# Patient Record
Sex: MALE | Race: WHITE | NOT HISPANIC OR LATINO | Employment: OTHER | URBAN - METROPOLITAN AREA
[De-identification: names, ages, dates, MRNs, and addresses within clinical notes are randomized per-mention and may not be internally consistent; named-entity substitution may affect disease eponyms.]

---

## 2017-08-15 RX ORDER — LISINOPRIL AND HYDROCHLOROTHIAZIDE 10; 12.5 MG/1; MG/1
TABLET ORAL
Qty: 90 TABLET | Refills: 3 | Status: SHIPPED | OUTPATIENT
Start: 2017-08-15 | End: 2018-08-02 | Stop reason: SDUPTHER

## 2017-09-18 ENCOUNTER — HOSPITAL ENCOUNTER (OUTPATIENT)
Dept: CARDIOLOGY | Facility: HOSPITAL | Age: 77
Discharge: HOME | End: 2017-09-18
Payer: MEDICARE

## 2017-09-18 DIAGNOSIS — M17.11 OSTEOARTHRITIS OF RIGHT KNEE: Primary | ICD-10-CM

## 2017-09-18 DIAGNOSIS — M17.11 OSTEOARTHRITIS OF RIGHT KNEE: ICD-10-CM

## 2017-09-18 PROCEDURE — 93010 ELECTROCARDIOGRAM REPORT: CPT | Mod: ,,, | Performed by: INTERNAL MEDICINE

## 2017-09-18 PROCEDURE — 93005 ELECTROCARDIOGRAM TRACING: CPT | Mod: PO

## 2017-09-19 ENCOUNTER — APPOINTMENT (OUTPATIENT)
Dept: LAB | Facility: HOSPITAL | Age: 77
End: 2017-09-19
Payer: MEDICARE

## 2017-09-19 DIAGNOSIS — Z01.818 PREOP EXAMINATION: Primary | ICD-10-CM

## 2017-09-19 LAB
BILIRUB UR QL STRIP: NEGATIVE
CLARITY UR REFRACT.AUTO: CLEAR
COLOR UR AUTO: ABNORMAL
GLUCOSE UR QL STRIP: NEGATIVE
HGB UR QL STRIP: ABNORMAL
KETONES UR QL STRIP: NEGATIVE
LEUKOCYTE ESTERASE UR QL STRIP: NEGATIVE
NITRITE UR QL STRIP: NEGATIVE
PH UR STRIP: 5 [PH] (ref 5–8)
PROT UR QL STRIP: NEGATIVE
SP GR UR STRIP: 1.02 (ref 1–1.03)
URN SPEC COLLECT METH UR: ABNORMAL
UROBILINOGEN UR STRIP-ACNC: NEGATIVE EU/DL

## 2017-09-19 PROCEDURE — 81003 URINALYSIS AUTO W/O SCOPE: CPT | Mod: PO

## 2017-09-25 ENCOUNTER — OFFICE VISIT (OUTPATIENT)
Dept: FAMILY MEDICINE | Facility: CLINIC | Age: 77
End: 2017-09-25
Payer: MEDICARE

## 2017-09-25 VITALS
DIASTOLIC BLOOD PRESSURE: 74 MMHG | TEMPERATURE: 99 F | BODY MASS INDEX: 32.61 KG/M2 | OXYGEN SATURATION: 95 % | HEART RATE: 78 BPM | SYSTOLIC BLOOD PRESSURE: 126 MMHG | WEIGHT: 215.19 LBS | HEIGHT: 68 IN

## 2017-09-25 DIAGNOSIS — I45.10 RIGHT BUNDLE BRANCH BLOCK: ICD-10-CM

## 2017-09-25 DIAGNOSIS — E87.6 HYPOKALEMIA: ICD-10-CM

## 2017-09-25 DIAGNOSIS — I10 ESSENTIAL HYPERTENSION: ICD-10-CM

## 2017-09-25 DIAGNOSIS — Z01.818 PREOPERATIVE EXAMINATION: Primary | ICD-10-CM

## 2017-09-25 PROCEDURE — 1159F MED LIST DOCD IN RCRD: CPT | Mod: S$GLB,,, | Performed by: FAMILY MEDICINE

## 2017-09-25 PROCEDURE — 1125F AMNT PAIN NOTED PAIN PRSNT: CPT | Mod: S$GLB,,, | Performed by: FAMILY MEDICINE

## 2017-09-25 PROCEDURE — 99213 OFFICE O/P EST LOW 20 MIN: CPT | Mod: 25,S$GLB,, | Performed by: FAMILY MEDICINE

## 2017-09-25 PROCEDURE — 3074F SYST BP LT 130 MM HG: CPT | Mod: S$GLB,,, | Performed by: FAMILY MEDICINE

## 2017-09-25 PROCEDURE — 3078F DIAST BP <80 MM HG: CPT | Mod: S$GLB,,, | Performed by: FAMILY MEDICINE

## 2017-09-25 RX ORDER — MECLIZINE HYDROCHLORIDE 25 MG/1
25 TABLET ORAL 3 TIMES DAILY PRN
Qty: 60 TABLET | Refills: 1 | Status: SHIPPED | OUTPATIENT
Start: 2017-09-25 | End: 2018-11-30

## 2017-09-25 RX ORDER — MELOXICAM 15 MG/1
TABLET ORAL
COMMUNITY
Start: 2017-08-11 | End: 2017-09-25

## 2017-09-25 RX ORDER — HYDROCODONE BITARTRATE AND IBUPROFEN 7.5; 2 MG/1; MG/1
TABLET, FILM COATED ORAL
Status: ON HOLD | COMMUNITY
Start: 2017-09-05 | End: 2017-10-04 | Stop reason: HOSPADM

## 2017-09-25 RX ORDER — MECLIZINE HYDROCHLORIDE 25 MG/1
25 TABLET ORAL 3 TIMES DAILY PRN
COMMUNITY
End: 2017-09-25 | Stop reason: SDUPTHER

## 2017-09-26 ENCOUNTER — TELEPHONE (OUTPATIENT)
Dept: FAMILY MEDICINE | Facility: CLINIC | Age: 77
End: 2017-09-26

## 2017-09-26 NOTE — PROGRESS NOTES
Patient ID: Teo Gloria is a 77 y.o. male.    Chief Complaint: Pre-op Exam    HPI       Teo Gloria is a 77 y.o. male here for preoperative clearance.  He has no upper respiratory or lower respiratory infections.  He has no chest pain palpitations.  He has no PND or orthopnea.  He has no change in his physical endurance.  He is able to walk up stairs without stopping to rest.  Limiting factor knee.      Review of Symptoms    Constitutional  Neg activity change except due to knee pain, No chills/ fever   Resp  Neg hemoptysis, stridor, choking  CVS  Neg chest pain, palpitations    Physical Exam    Constitutional:   Oriented to person, place, and time.appears well-developed and well-nourished.   No distress.     HENT:   Head: Normocephalic and atraumatic.     Right Ear: Tympanic membrane, external ear and ear canal normal     Left Ear: Tympanic membrane, external ear and ear canal normal    Nose: External Normal. Normal turbinates, No rhinorrhea     Mouth/Throat: Uvula is midline, oropharynx is clear and moist and mucous membranes are normal.     Neck: supple no anterior cervical adenopathy    Carotid artery:  Bruit    NEG [x]   POS[]    Eyes:   Conjunctivae are normal. Right eye exhibits no discharge. Left eye exhibits no discharge. No scleral icterus. No periorbital edema       Cardiovascular:  Regular rate, Regular rhythm     No extrasystole  Normal S1-S2    Pulmonary/Chest:   Normal effort and breath sounds.  No wheezing, rhonchi or rales.       Neurological:   Alert and oriented to person, place, and time. Coordination normal.     Skin:   Skin is warm and dry.   No diaphoresis.   No rash noted.    Psychiatric: Normal mood and affect. Behavior is normal. Judgment and thought content normal.       Assessment / Plan:      ICD-10-CM ICD-9-CM   1. Preoperative examination Z01.818 V72.84   2. Hypokalemia E87.6 276.8   3. Essential hypertension I10 401.9   4. Right bundle branch block I45.10 426.4     Preoperative  examination    Hypokalemia  -     Basic metabolic panel; Future; Expected date: 09/25/2017    Essential hypertension    Right bundle branch block    Other orders  -     meclizine (ANTIVERT) 25 mg tablet; Take 1 tablet (25 mg total) by mouth 3 (three) times daily as needed.  Dispense: 60 tablet; Refill: 1        Mr. Bruce is in good physical condition and has asymptomatic right bundle-branch block.  He is stable for upcoming orthopedic surgery to replace right knee.  Basic metabolic panel demonstrates slightly low potassium would consider redrawing prior to surgery or morning of surgery.  BMP ordered at [facility]    Lab work performed nonfasting-encourage appropriate fluid intake

## 2017-09-27 ENCOUNTER — ANESTHESIA EVENT (OUTPATIENT)
Dept: SURGERY | Facility: HOSPITAL | Age: 77
DRG: 470 | End: 2017-09-27
Payer: MEDICARE

## 2017-09-27 ENCOUNTER — HOSPITAL ENCOUNTER (OUTPATIENT)
Dept: PREADMISSION TESTING | Facility: HOSPITAL | Age: 77
Discharge: HOME | End: 2017-09-27
Payer: MEDICARE

## 2017-09-27 VITALS
WEIGHT: 215 LBS | DIASTOLIC BLOOD PRESSURE: 79 MMHG | HEART RATE: 69 BPM | BODY MASS INDEX: 32.58 KG/M2 | RESPIRATION RATE: 18 BRPM | SYSTOLIC BLOOD PRESSURE: 175 MMHG | OXYGEN SATURATION: 99 % | HEIGHT: 68 IN

## 2017-09-27 DIAGNOSIS — Z01.818 PRE-OP EXAMINATION: Primary | ICD-10-CM

## 2017-09-27 RX ORDER — SODIUM CHLORIDE, SODIUM LACTATE, POTASSIUM CHLORIDE, CALCIUM CHLORIDE 600; 310; 30; 20 MG/100ML; MG/100ML; MG/100ML; MG/100ML
INJECTION, SOLUTION INTRAVENOUS CONTINUOUS
Status: CANCELLED | OUTPATIENT
Start: 2017-09-27

## 2017-09-27 RX ORDER — LIDOCAINE HYDROCHLORIDE 10 MG/ML
1 INJECTION, SOLUTION EPIDURAL; INFILTRATION; INTRACAUDAL; PERINEURAL ONCE
Status: CANCELLED | OUTPATIENT
Start: 2017-09-27 | End: 2017-09-27

## 2017-09-27 RX ORDER — CEFAZOLIN SODIUM 2 G/50ML
2 SOLUTION INTRAVENOUS
Status: CANCELLED | OUTPATIENT
Start: 2017-10-03

## 2017-09-27 NOTE — PROGRESS NOTES
Patient present at joint camp today for education and teaching prior to surgery.  Patient has walker and bedside commode at home already.  No durable medical equipment ordered today.

## 2017-09-27 NOTE — PRE-PROCEDURE INSTRUCTIONS
Jordan Del Cid [phone] - son    Allergies, medical, surgical, family and psychosocial histories reviewed with patient. Periop plan of care reviewed. Preop instructions given, including medications to take and to hold. Time allotted for questions to be addressed.  Patient verbalized understanding.

## 2017-09-27 NOTE — PROGRESS NOTES
Patient is present at joint camp today for education and teaching prior to surgery.  Equipment order today for delivery to home address prior to surgery was a walker and bedside commode.

## 2017-09-27 NOTE — ANESTHESIA PREPROCEDURE EVALUATION
"                                                                                                             09/27/2017  Teo Gloria is a 77 y.o., male is scheduled for right TKA under spinal/reg/MAC/gen on 10/03/2017.    PCP H&P and clearance in [facility] on 9/26/2017.  Pt with mild hypokalemia and on lisinopril-HCTZ.  Will re-draw K+ day of surgery.        Past Surgical History:   Procedure Laterality Date    APPENDECTOMY      BACK SURGERY      CHOLECYSTECTOMY      COLONOSCOPY  01/01/2017    repeat Q 5 years    EYE SURGERY Bilateral     cataract    HERNIA REPAIR      SHOULDER ARTHROSCOPY Bilateral     TOTAL KNEE ARTHROPLASTY Right 1960's         Anesthesia Evaluation    I have reviewed the Patient Summary Reports.    I have reviewed the Nursing Notes.   I have reviewed the Medications.     Review of Systems  Anesthesia Hx:  No problems with previous Anesthesia  History of prior surgery of interest to airway management or planning: Previous anesthesia: General, MAC  Denies Personal Hx of Anesthesia complications.   Social:  No Alcohol Use, Non-Smoker    Hematology/Oncology:  Hematology Normal        EENT/Dental:EENT/Dental Normal   Cardiovascular:   Exercise tolerance: good Hypertension, well controlled Denies Dysrhythmias.   Denies Angina.     ECG has been reviewed.    Pulmonary:   Denies Shortness of breath.    Renal/:  Renal/ Normal     Hepatic/GI:  Hepatic/GI Normal    Musculoskeletal:   Arthritis  Right knee pain   Neurological:  Neurology Normal  Neuro Symptoms of vertigo Pt states "sinus related equilibrium problems"  States controls with antivert; denies at present  Endocrine:  Endocrine Normal        Physical Exam  General:  Obesity    Airway/Jaw/Neck:  Airway Findings: Mouth Opening: Normal Tongue: Normal  General Airway Assessment: Adult  Mallampati: II  TM Distance: Normal, at least 6 cm  Jaw/Neck Findings:     Eyes/Ears/Nose:  EYES/EARS/NOSE FINDINGS: Normal   Dental:  Dental Findings: " Periodontal disease, Mild    Chest/Lungs:  Chest/Lungs Clear    Heart/Vascular:  Heart Findings: Normal Heart murmur: negative    Abdomen:  Abdomen Findings: Normal    Musculoskeletal:  Musculoskeletal Findings: (right knee) Tender Joint     Mental Status:  Mental Status Findings: Normal      EKG 9/18/2017  Sinus rhythm with marked sinus arrythmia  Right bundle branch block  Abnormal ECG  No previous ECGs available  Confirmed by CLAYTON MARKS, CANDY ([id number]) on 9/18/2017 4:43:54 PM    Anesthesia Plan  Type of Anesthesia, risks & benefits discussed:  Anesthesia Type:  general, MAC, regional, spinal  Patient's Preference:   Intra-op Monitoring Plan: standard ASA monitors  Intra-op Monitoring Plan Comments:   Post Op Pain Control Plan: multimodal analgesia  Post Op Pain Control Plan Comments:   Induction:   IV  Beta Blocker:  Patient is not currently on a Beta-Blocker (No further documentation required).       Informed Consent: Patient understands risks and agrees with Anesthesia plan.  Questions answered.   ASA Score: 2     Day of Surgery Review of History & Physical:        Anesthesia Plan Notes: PCP H&P and clearance in [facility] on 9/26/2017.  Pt with mild hypokalemia and on lisinopril-HCTZ.  Will re-draw K+ day of surgery.          Ready For Surgery From Anesthesia Perspective.

## 2017-09-27 NOTE — DISCHARGE INSTRUCTIONS
Your surgery is scheduled for 10/3/17.    Please report to Outpatient Surgery Intake Office on the 2nd FLOOR at 5:30a.m.          INSTRUCTIONS IMPORTANT!!!  ¨ Do not eat or drink after 12 midnight-including water. OK to brush teeth, no   gum, candy or mints!    ¨ Take only these medicines with a small swallow of water-morning of surgery: Lisinopril          ____  No powder, lotions or creams to surgical area.  ____  Please remove all jewelry, including piercings and leave at home.  ____  No money or valuables needed. Please leave at home.  ____  Please bring any documents given by your doctor.  ____  If going home the same day, arrange for a ride home. You will not be able to             drive if Anesthesia was used.  ____  Wear loose fitting clothing. Allow for dressings, bandages.  ____  Stop Aspirin, Ibuprofen, Motrin and Aleve at least 3-5 days before surgery, unless otherwise instructed by your doctor, or the nurse.   You MAY use Tylenol/acetaminophen until day of surgery.  ____  Wash the surgical area with Hibiclens the night before surgery, and again the             morning of surgery.  Be sure to rinse hibiclens off completely (if instructed by   nurse).  ____  If you take diabetic medication, do not take am of surgery unless instructed by Doctor.  ____  Call MD for temperature above 101 degrees.  ____ Stop taking any Fish Oil supplement or any Vitamins that contain Vitamin E at least 5 days prior to surgery.  ____ Do Not wear your contact lenses the day of your procedure.  You may wear your glasses.        I have read or had read and explained to me, and understand the above information.  Additional comments or instructions:  For additional questions call [phone]     Take a Hibiclens shower twice a day for 3 days prior to surgery, including the morning of surgery.   Gargle with Listerine twice a day for 3 days prior to surgery, including the morning of surgery.      Pre-Op Bathing Instructions    Before  surgery, you can play an important role in your own health.    Because skin is not sterile, we need to be sure that your skin is as free of germs as possible. By following the instructions below, you can reduce the number of germs on your skin before surgery.    IMPORTANT: You will need to shower with a special soap called Hibiclens*, available at any pharmacy.  If you are allergic to Chlorhexidine (the antiseptic in Hibiclens), use an antibacterial soap such as Dial Soap for your preoperative shower.  You will shower with Hibiclens both the night before your surgery and the morning of your surgery.  Do not use Hibiclens on the head, face or genitals to avoid injury to those areas.    STEP #1: THE NIGHT BEFORE YOUR SURGERY     1. Do not shave the area of your body where your surgery will be performed.  2. Shower and wash your hair and body as usual with your normal soap and shampoo.  3. Rinse your hair and body thoroughly after you shower to remove all soap residue.  4. With your hand, apply one packet of Hibiclens soap to the surgical site.   5. Wash the site gently for five (5) minutes. Do not scrub your skin too hard.   6. Do not wash with your regular soap after Hibiclens is used.  7. Rinse your body thoroughly.  8. Pat yourself dry with a clean, soft towel.  9. Do not use lotion, cream, or powder.  10. Wear clean clothes.    STEP #2: THE MORNING OF YOUR SURGERY     1. Repeat Step #1.    * Not to be used by people allergic to Chlorhexidine.          Anesthesia: General Anesthesia     You are watched continuously during your procedure by your anesthesia provider.     Youre due to have surgery. During surgery, youll be given medicine called anesthesia or anesthetic. This will keep you comfortable and pain-free. Your anesthesia provider will use general anesthesia.  What is general anesthesia?  General anesthesia puts you into a state like deep sleep. It goes into the bloodstream (IV anesthetics), into the lungs  (gas anesthetics), or both. You feel nothing during the procedure. You will not remember it. During the procedure, the anesthesia provider monitors you continuously. He or she checks your heart rate and rhythm, blood pressure, breathing, and blood oxygen.  · IV anesthetics. IV anesthetics are given through an IV line in your arm. Theyre often given first. This is so you are asleep before a gas anesthetic is started. Some kinds of IV anesthetics relieve pain. Others relax you. Your doctor will decide which kind is best in your case.  · Gas anesthetics. Gas anesthetics are breathed into the lungs. They are often used to keep you asleep. They can be given through a facemask or a tube placed in your larynx or trachea (breathing tube).  ¨ If you have a facemask, your anesthesia provider will most likely place it over your nose and mouth while youre still awake. Youll breathe oxygen through the mask as your IV anesthetic is started. Gas anesthetic may be added through the mask.  ¨ If you have a tube in the larynx or trachea, it will be inserted into your throat after youre asleep.  Anesthesia tools and medicines  You will likely have:  · IV anesthetics. These are put into an IV line into your bloodstream.  · Gas anesthetics. You breathe these anesthetics into your lungs, where they pass into your bloodstream.  · Pulse oximeter. This is a small clip that is attached to the end of your finger. This measures your blood oxygen level.  · Electrocardiography leads (electrodes). These are small sticky pads that are placed on your chest. They record your heart rate and rhythm.  · Blood pressure cuff. This reads your blood pressure.  Risks and possible complications  General anesthesia has some risks. These include:  · Breathing problems  · Nausea and vomiting  · Sore throat or hoarseness (usually temporary)  · Allergic reaction to the anesthetic  · Irregular heartbeat (rare)  · Cardiac arrest (rare)   Anesthesia  safety  · Follow all instructions you are given for how long not to eat or drink before your procedure.  · Be sure your doctor knows what medicines and drugs you take. This includes over-the-counter medicines, herbs, supplements, alcohol or other drugs. You will be asked when those were last taken.  · Have an adult family member or friend drive you home after the procedure.  · For the first 24 hours after your surgery:  ¨ Do not drive or use heavy equipment.  ¨ Do not make important decisions or sign legal documents. If important decisions or signing legal documents is necessary during the first 24 hours after surgery, have a trusted family member or spouse act on your behalf.  ¨ Avoid alcohol.  ¨ Have a responsible adult stay with you. He or she can watch for problems and help keep you safe.  Date Last Reviewed: 12/1/2016 © 2000-2017 [employer]. [address] [place] [address]. All rights reserved. This information is not intended as a substitute for professional medical care. Always follow your healthcare professional's instructions.        Total Knee Replacement  During total knee replacement surgery, your damaged knee joint is replaced with an artificial joint, called a prosthesis. This surgery almost always reduces joint pain and improves your quality of life.     The parts of the prosthesis are secured to the bones of the knee. Together they form the new joint.   Before your surgery  You will most likely arrive at the hospital on the morning of the surgery. Be sure to follow all of your doctors instructions on preparing for surgery:  · Follow any directions you are given for taking medicines or for not eating or drinking before surgery.  · At the hospital, your temperature, pulse, breathing, and blood pressure will be checked.  · An IV (intravenous) line will be started to provide fluids and medicines needed during surgery.  The surgical procedure  When the surgical team is ready,  youll be taken to the operating room. There youll be given anesthesia to help you sleep through surgery, or to make you numb from the waist down. Then an incision is made on the front or side of your knee. Any damaged bone is cleaned away, and the new joint components are put into place. The incision is closed with surgical staples or stitches.  After your surgery  After surgery, youll be sent to the recovery room. When you are fully awake, youll be moved to your room. The nurses will give you medicines to ease your pain. You may have a catheter (small tube) in your bladder. A continuous passive motion machine may be used on your knee to keep it from getting stiff. A sequential compression machine may be used to prevent blood clots by gently squeezing then releasing your leg. You may be given medicine to prevent blood clots. Soon, healthcare providers will help you get up and moving.  When to call your doctor  Once at home, call your doctor if you have any of the symptoms below:  · An increase in knee pain  · Pain or swelling in the calf or leg  · Unusual redness, heat, or drainage at the incision site  · Fever of 100.4°F (38°C) or higher  · Shaking chills  · Trouble breathing or chest pain (call 911)   Date Last Reviewed: 9/20/2015 © 2000-2017 [employer]. [address] [place] [address], [place] [address]. All rights reserved. This information is not intended as a substitute for professional medical care. Always follow your healthcare professional's instructions.

## 2017-10-03 ENCOUNTER — ANESTHESIA (OUTPATIENT)
Dept: SURGERY | Facility: HOSPITAL | Age: 77
DRG: 470 | End: 2017-10-03
Payer: MEDICARE

## 2017-10-03 ENCOUNTER — HOSPITAL ENCOUNTER (INPATIENT)
Facility: HOSPITAL | Age: 77
LOS: 1 days | Discharge: HOME HEALTH | DRG: 470 | End: 2017-10-04
Payer: MEDICARE

## 2017-10-03 DIAGNOSIS — Z01.818 PRE-OP EXAMINATION: ICD-10-CM

## 2017-10-03 DIAGNOSIS — M17.11 RIGHT KNEE DJD: Primary | ICD-10-CM

## 2017-10-03 LAB
ANION GAP SERPL CALC-SCNC: 11 MMOL/L
BUN SERPL-MCNC: 23 MG/DL
CALCIUM SERPL-MCNC: 9.1 MG/DL
CHLORIDE SERPL-SCNC: 105 MMOL/L
CO2 SERPL-SCNC: 26 MMOL/L
CREAT SERPL-MCNC: 1 MG/DL
EST. GFR  (AFRICAN AMERICAN): >60 ML/MIN/1.73 M^2
EST. GFR  (NON AFRICAN AMERICAN): >60 ML/MIN/1.73 M^2
GLUCOSE SERPL-MCNC: 102 MG/DL
HCT VFR BLD AUTO: 36.1 %
POTASSIUM SERPL-SCNC: 3.2 MMOL/L
SODIUM SERPL-SCNC: 142 MMOL/L

## 2017-10-03 PROCEDURE — 63600175 PHARM REV CODE 636 W HCPCS

## 2017-10-03 PROCEDURE — 76942 ECHO GUIDE FOR BIOPSY: CPT | Performed by: ANESTHESIOLOGY

## 2017-10-03 PROCEDURE — C1713 ANCHOR/SCREW BN/BN,TIS/BN: HCPCS

## 2017-10-03 PROCEDURE — 36000710

## 2017-10-03 PROCEDURE — 25000003 PHARM REV CODE 250: Performed by: NURSE PRACTITIONER

## 2017-10-03 PROCEDURE — 63600175 PHARM REV CODE 636 W HCPCS: Performed by: ANESTHESIOLOGY

## 2017-10-03 PROCEDURE — 63600175 PHARM REV CODE 636 W HCPCS: Performed by: NURSE ANESTHETIST, CERTIFIED REGISTERED

## 2017-10-03 PROCEDURE — 37000009 HC ANESTHESIA EA ADD 15 MINS

## 2017-10-03 PROCEDURE — 97165 OT EVAL LOW COMPLEX 30 MIN: CPT

## 2017-10-03 PROCEDURE — 0SRC0J9 REPLACEMENT OF RIGHT KNEE JOINT WITH SYNTHETIC SUBSTITUTE, CEMENTED, OPEN APPROACH: ICD-10-PCS

## 2017-10-03 PROCEDURE — 88305 TISSUE EXAM BY PATHOLOGIST: CPT | Performed by: PATHOLOGY

## 2017-10-03 PROCEDURE — G8979 MOBILITY GOAL STATUS: HCPCS | Mod: CJ

## 2017-10-03 PROCEDURE — 11000001 HC ACUTE MED/SURG PRIVATE ROOM

## 2017-10-03 PROCEDURE — 36415 COLL VENOUS BLD VENIPUNCTURE: CPT

## 2017-10-03 PROCEDURE — 25000003 PHARM REV CODE 250: Performed by: NURSE ANESTHETIST, CERTIFIED REGISTERED

## 2017-10-03 PROCEDURE — 25000003 PHARM REV CODE 250

## 2017-10-03 PROCEDURE — 71000033 HC RECOVERY, INTIAL HOUR

## 2017-10-03 PROCEDURE — 94761 N-INVAS EAR/PLS OXIMETRY MLT: CPT

## 2017-10-03 PROCEDURE — 27201423 OPTIME MED/SURG SUP & DEVICES STERILE SUPPLY

## 2017-10-03 PROCEDURE — G8978 MOBILITY CURRENT STATUS: HCPCS | Mod: CK

## 2017-10-03 PROCEDURE — C1776 JOINT DEVICE (IMPLANTABLE): HCPCS

## 2017-10-03 PROCEDURE — 97161 PT EVAL LOW COMPLEX 20 MIN: CPT

## 2017-10-03 PROCEDURE — 3E0T3BZ INTRODUCTION OF ANESTHETIC AGENT INTO PERIPHERAL NERVES AND PLEXI, PERCUTANEOUS APPROACH: ICD-10-PCS

## 2017-10-03 PROCEDURE — 25000003 PHARM REV CODE 250: Performed by: ANESTHESIOLOGY

## 2017-10-03 PROCEDURE — 80048 BASIC METABOLIC PNL TOTAL CA: CPT

## 2017-10-03 PROCEDURE — 85014 HEMATOCRIT: CPT

## 2017-10-03 PROCEDURE — 36000711

## 2017-10-03 PROCEDURE — C9290 INJ, BUPIVACAINE LIPOSOME: HCPCS

## 2017-10-03 PROCEDURE — 37000008 HC ANESTHESIA 1ST 15 MINUTES

## 2017-10-03 PROCEDURE — 88305 TISSUE EXAM BY PATHOLOGIST: CPT | Mod: 26,,, | Performed by: PATHOLOGY

## 2017-10-03 PROCEDURE — 71000039 HC RECOVERY, EACH ADD'L HOUR

## 2017-10-03 DEVICE — COMP FEM POS ATTUNE SZ6 R: Type: IMPLANTABLE DEVICE | Site: KNEE | Status: FUNCTIONAL

## 2017-10-03 DEVICE — PATELLA ATTUNE MEDLZD DOME 41: Type: IMPLANTABLE DEVICE | Site: KNEE | Status: FUNCTIONAL

## 2017-10-03 DEVICE — IMPLANTABLE DEVICE: Type: IMPLANTABLE DEVICE | Site: KNEE | Status: FUNCTIONAL

## 2017-10-03 DEVICE — CEMENT BONE IMPLANT: Type: IMPLANTABLE DEVICE | Site: KNEE | Status: FUNCTIONAL

## 2017-10-03 RX ORDER — SODIUM CHLORIDE, SODIUM LACTATE, POTASSIUM CHLORIDE, CALCIUM CHLORIDE 600; 310; 30; 20 MG/100ML; MG/100ML; MG/100ML; MG/100ML
INJECTION, SOLUTION INTRAVENOUS CONTINUOUS
Status: DISCONTINUED | OUTPATIENT
Start: 2017-10-03 | End: 2017-10-04

## 2017-10-03 RX ORDER — HYDROCHLOROTHIAZIDE 12.5 MG/1
12.5 TABLET ORAL DAILY
Status: DISCONTINUED | OUTPATIENT
Start: 2017-10-03 | End: 2017-10-04 | Stop reason: HOSPADM

## 2017-10-03 RX ORDER — ONDANSETRON 2 MG/ML
4 INJECTION INTRAMUSCULAR; INTRAVENOUS DAILY PRN
Status: DISCONTINUED | OUTPATIENT
Start: 2017-10-03 | End: 2017-10-03

## 2017-10-03 RX ORDER — LIDOCAINE HCL/PF 100 MG/5ML
SYRINGE (ML) INTRAVENOUS
Status: DISCONTINUED | OUTPATIENT
Start: 2017-10-03 | End: 2017-10-03

## 2017-10-03 RX ORDER — RAMELTEON 8 MG/1
8 TABLET ORAL NIGHTLY PRN
Status: DISCONTINUED | OUTPATIENT
Start: 2017-10-03 | End: 2017-10-04 | Stop reason: HOSPADM

## 2017-10-03 RX ORDER — DIPHENHYDRAMINE HYDROCHLORIDE 50 MG/ML
12.5 INJECTION INTRAMUSCULAR; INTRAVENOUS EVERY 6 HOURS PRN
Status: DISCONTINUED | OUTPATIENT
Start: 2017-10-03 | End: 2017-10-03

## 2017-10-03 RX ORDER — SUCCINYLCHOLINE CHLORIDE 20 MG/ML
INJECTION INTRAMUSCULAR; INTRAVENOUS
Status: DISCONTINUED | OUTPATIENT
Start: 2017-10-03 | End: 2017-10-03

## 2017-10-03 RX ORDER — ACETAMINOPHEN 10 MG/ML
1000 INJECTION, SOLUTION INTRAVENOUS ONCE
Status: COMPLETED | OUTPATIENT
Start: 2017-10-03 | End: 2017-10-03

## 2017-10-03 RX ORDER — DOCUSATE SODIUM 100 MG/1
100 CAPSULE, LIQUID FILLED ORAL EVERY 12 HOURS
Status: DISCONTINUED | OUTPATIENT
Start: 2017-10-03 | End: 2017-10-04 | Stop reason: HOSPADM

## 2017-10-03 RX ORDER — TRANEXAMIC ACID 100 MG/ML
1000 INJECTION, SOLUTION INTRAVENOUS ONCE
Status: COMPLETED | OUTPATIENT
Start: 2017-10-03 | End: 2017-10-03

## 2017-10-03 RX ORDER — ACETAMINOPHEN 325 MG/1
650 TABLET ORAL EVERY 4 HOURS PRN
Status: DISCONTINUED | OUTPATIENT
Start: 2017-10-03 | End: 2017-10-04 | Stop reason: HOSPADM

## 2017-10-03 RX ORDER — LISINOPRIL AND HYDROCHLOROTHIAZIDE 10; 12.5 MG/1; MG/1
1 TABLET ORAL DAILY
Status: DISCONTINUED | OUTPATIENT
Start: 2017-10-03 | End: 2017-10-03

## 2017-10-03 RX ORDER — FAMOTIDINE 20 MG/1
20 TABLET, FILM COATED ORAL 2 TIMES DAILY
Status: DISCONTINUED | OUTPATIENT
Start: 2017-10-03 | End: 2017-10-04 | Stop reason: HOSPADM

## 2017-10-03 RX ORDER — ROCURONIUM BROMIDE 10 MG/ML
INJECTION, SOLUTION INTRAVENOUS
Status: DISCONTINUED | OUTPATIENT
Start: 2017-10-03 | End: 2017-10-03

## 2017-10-03 RX ORDER — HYDROMORPHONE HYDROCHLORIDE 2 MG/ML
0.5 INJECTION, SOLUTION INTRAMUSCULAR; INTRAVENOUS; SUBCUTANEOUS EVERY 5 MIN PRN
Status: DISCONTINUED | OUTPATIENT
Start: 2017-10-03 | End: 2017-10-03

## 2017-10-03 RX ORDER — CEFAZOLIN SODIUM 2 G/50ML
2 SOLUTION INTRAVENOUS
Status: DISCONTINUED | OUTPATIENT
Start: 2017-10-03 | End: 2017-10-03

## 2017-10-03 RX ORDER — CELECOXIB 100 MG/1
200 CAPSULE ORAL DAILY
Status: DISCONTINUED | OUTPATIENT
Start: 2017-10-03 | End: 2017-10-04 | Stop reason: HOSPADM

## 2017-10-03 RX ORDER — LIDOCAINE HYDROCHLORIDE 10 MG/ML
1 INJECTION, SOLUTION EPIDURAL; INFILTRATION; INTRACAUDAL; PERINEURAL ONCE
Status: DISCONTINUED | OUTPATIENT
Start: 2017-10-03 | End: 2017-10-03

## 2017-10-03 RX ORDER — MECLIZINE HYDROCHLORIDE 25 MG/1
25 TABLET ORAL 3 TIMES DAILY PRN
Status: DISCONTINUED | OUTPATIENT
Start: 2017-10-03 | End: 2017-10-04 | Stop reason: HOSPADM

## 2017-10-03 RX ORDER — SODIUM CHLORIDE 0.9 % (FLUSH) 0.9 %
3 SYRINGE (ML) INJECTION
Status: DISCONTINUED | OUTPATIENT
Start: 2017-10-03 | End: 2017-10-03

## 2017-10-03 RX ORDER — LISINOPRIL 10 MG/1
10 TABLET ORAL DAILY
Status: DISCONTINUED | OUTPATIENT
Start: 2017-10-03 | End: 2017-10-04 | Stop reason: HOSPADM

## 2017-10-03 RX ORDER — OXYCODONE HYDROCHLORIDE 5 MG/1
10 TABLET ORAL ONCE
Status: COMPLETED | OUTPATIENT
Start: 2017-10-03 | End: 2017-10-03

## 2017-10-03 RX ORDER — PROPOFOL 10 MG/ML
VIAL (ML) INTRAVENOUS
Status: DISCONTINUED | OUTPATIENT
Start: 2017-10-03 | End: 2017-10-03

## 2017-10-03 RX ORDER — CEFAZOLIN SODIUM 2 G/50ML
2 SOLUTION INTRAVENOUS
Status: COMPLETED | OUTPATIENT
Start: 2017-10-03 | End: 2017-10-03

## 2017-10-03 RX ORDER — ONDANSETRON 2 MG/ML
4 INJECTION INTRAMUSCULAR; INTRAVENOUS EVERY 8 HOURS PRN
Status: DISCONTINUED | OUTPATIENT
Start: 2017-10-03 | End: 2017-10-04 | Stop reason: HOSPADM

## 2017-10-03 RX ORDER — OXYCODONE AND ACETAMINOPHEN 5; 325 MG/1; MG/1
2 TABLET ORAL EVERY 4 HOURS PRN
Status: DISCONTINUED | OUTPATIENT
Start: 2017-10-03 | End: 2017-10-04

## 2017-10-03 RX ORDER — GLYCOPYRROLATE 0.2 MG/ML
INJECTION INTRAMUSCULAR; INTRAVENOUS
Status: DISCONTINUED | OUTPATIENT
Start: 2017-10-03 | End: 2017-10-03

## 2017-10-03 RX ORDER — MUPIROCIN 20 MG/G
1 OINTMENT TOPICAL 2 TIMES DAILY
Status: DISCONTINUED | OUTPATIENT
Start: 2017-10-03 | End: 2017-10-04 | Stop reason: HOSPADM

## 2017-10-03 RX ORDER — HYDROMORPHONE HYDROCHLORIDE 1 MG/ML
0.2 INJECTION, SOLUTION INTRAMUSCULAR; INTRAVENOUS; SUBCUTANEOUS
Status: DISCONTINUED | OUTPATIENT
Start: 2017-10-03 | End: 2017-10-04 | Stop reason: HOSPADM

## 2017-10-03 RX ORDER — BUPIVACAINE HYDROCHLORIDE 2.5 MG/ML
INJECTION, SOLUTION EPIDURAL; INFILTRATION; INTRACAUDAL
Status: DISCONTINUED | OUTPATIENT
Start: 2017-10-03 | End: 2017-10-03 | Stop reason: HOSPADM

## 2017-10-03 RX ORDER — ZOLPIDEM TARTRATE 5 MG/1
5 TABLET ORAL NIGHTLY PRN
Status: DISCONTINUED | OUTPATIENT
Start: 2017-10-03 | End: 2017-10-03

## 2017-10-03 RX ORDER — ONDANSETRON 2 MG/ML
INJECTION INTRAMUSCULAR; INTRAVENOUS
Status: DISCONTINUED | OUTPATIENT
Start: 2017-10-03 | End: 2017-10-03

## 2017-10-03 RX ORDER — SODIUM CHLORIDE, SODIUM LACTATE, POTASSIUM CHLORIDE, CALCIUM CHLORIDE 600; 310; 30; 20 MG/100ML; MG/100ML; MG/100ML; MG/100ML
INJECTION, SOLUTION INTRAVENOUS CONTINUOUS
Status: DISCONTINUED | OUTPATIENT
Start: 2017-10-03 | End: 2017-10-03

## 2017-10-03 RX ORDER — NEOSTIGMINE METHYLSULFATE 1 MG/ML
INJECTION, SOLUTION INTRAVENOUS
Status: DISCONTINUED | OUTPATIENT
Start: 2017-10-03 | End: 2017-10-03

## 2017-10-03 RX ORDER — FENTANYL CITRATE 50 UG/ML
INJECTION, SOLUTION INTRAMUSCULAR; INTRAVENOUS
Status: DISCONTINUED | OUTPATIENT
Start: 2017-10-03 | End: 2017-10-03

## 2017-10-03 RX ADMIN — RIVAROXABAN 10 MG: 10 TABLET, FILM COATED ORAL at 05:10

## 2017-10-03 RX ADMIN — HYDROMORPHONE HYDROCHLORIDE 0.5 MG: 2 INJECTION, SOLUTION INTRAMUSCULAR; INTRAVENOUS; SUBCUTANEOUS at 09:10

## 2017-10-03 RX ADMIN — PROPOFOL 200 MG: 10 INJECTION, EMULSION INTRAVENOUS at 07:10

## 2017-10-03 RX ADMIN — FAMOTIDINE 20 MG: 20 TABLET ORAL at 08:10

## 2017-10-03 RX ADMIN — ROCURONIUM BROMIDE 5 MG: 10 INJECTION, SOLUTION INTRAVENOUS at 07:10

## 2017-10-03 RX ADMIN — FENTANYL CITRATE 100 MCG: 50 INJECTION, SOLUTION INTRAMUSCULAR; INTRAVENOUS at 08:10

## 2017-10-03 RX ADMIN — TRANEXAMIC ACID 1000 MG: 100 INJECTION, SOLUTION INTRAVENOUS at 09:10

## 2017-10-03 RX ADMIN — FENTANYL CITRATE 100 MCG: 50 INJECTION, SOLUTION INTRAMUSCULAR; INTRAVENOUS at 07:10

## 2017-10-03 RX ADMIN — SODIUM CHLORIDE, SODIUM LACTATE, POTASSIUM CHLORIDE, AND CALCIUM CHLORIDE: .6; .31; .03; .02 INJECTION, SOLUTION INTRAVENOUS at 06:10

## 2017-10-03 RX ADMIN — OXYCODONE HYDROCHLORIDE 10 MG: 5 TABLET ORAL at 01:10

## 2017-10-03 RX ADMIN — ACETAMINOPHEN 1000 MG: 10 INJECTION, SOLUTION INTRAVENOUS at 10:10

## 2017-10-03 RX ADMIN — OXYCODONE AND ACETAMINOPHEN 2 TABLET: 5; 325 TABLET ORAL at 08:10

## 2017-10-03 RX ADMIN — LIDOCAINE HYDROCHLORIDE 100 MG: 20 INJECTION, SOLUTION INTRAVENOUS at 07:10

## 2017-10-03 RX ADMIN — FENTANYL CITRATE 50 MCG: 50 INJECTION, SOLUTION INTRAMUSCULAR; INTRAVENOUS at 07:10

## 2017-10-03 RX ADMIN — CEFAZOLIN SODIUM 2 G: 2 SOLUTION INTRAVENOUS at 11:10

## 2017-10-03 RX ADMIN — TRANEXAMIC ACID 1000 MG: 100 INJECTION, SOLUTION INTRAVENOUS at 07:10

## 2017-10-03 RX ADMIN — GLYCOPYRROLATE 0.6 MG: 0.2 INJECTION, SOLUTION INTRAMUSCULAR; INTRAVENOUS at 08:10

## 2017-10-03 RX ADMIN — FENTANYL CITRATE 50 MCG: 50 INJECTION, SOLUTION INTRAMUSCULAR; INTRAVENOUS at 08:10

## 2017-10-03 RX ADMIN — CEFAZOLIN SODIUM 2 G: 2 SOLUTION INTRAVENOUS at 05:10

## 2017-10-03 RX ADMIN — HYDROMORPHONE HYDROCHLORIDE 0.2 MG: 1 INJECTION, SOLUTION INTRAMUSCULAR; INTRAVENOUS; SUBCUTANEOUS at 11:10

## 2017-10-03 RX ADMIN — CELECOXIB 200 MG: 100 CAPSULE ORAL at 05:10

## 2017-10-03 RX ADMIN — MUPIROCIN 1 G: 20 OINTMENT TOPICAL at 08:10

## 2017-10-03 RX ADMIN — DOCUSATE SODIUM 100 MG: 100 CAPSULE, LIQUID FILLED ORAL at 08:10

## 2017-10-03 RX ADMIN — SUCCINYLCHOLINE CHLORIDE 120 MG: 20 INJECTION, SOLUTION INTRAMUSCULAR; INTRAVENOUS at 07:10

## 2017-10-03 RX ADMIN — ONDANSETRON 8 MG: 2 INJECTION, SOLUTION INTRAMUSCULAR; INTRAVENOUS at 08:10

## 2017-10-03 RX ADMIN — NEOSTIGMINE METHYLSULFATE 3 MG: 1 INJECTION INTRAVENOUS at 08:10

## 2017-10-03 RX ADMIN — CEFAZOLIN SODIUM 2 G: 2 SOLUTION INTRAVENOUS at 07:10

## 2017-10-03 NOTE — BRIEF OP NOTE
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 [employer] - Perm      10/03/17 0839

## 2017-10-03 NOTE — PLAN OF CARE
Pt's room is ready per US Blanca 5a. Pt's family updated. No changes in assessment. Pt transported to room 527 with VAMSHI Lundberg.

## 2017-10-03 NOTE — PLAN OF CARE
Problem: Physical Therapy Goal  Goal: Physical Therapy Goal  Goals to be met by: 11/3/17     Patient will increase functional independence with mobility by performin. Supine <> sit with Modified Williamson  2. Sit to stand transfer with Supervision  3. Bed to chair transfer with Supervision using Rolling Walker  4. Gait  x 100 feet with Supervision using Rolling Walker.   5. Lower extremity exercise program x 10 reps per handout, with supervision    Outcome: Ongoing (interventions implemented as appropriate)  PT evaluation completed today and pt will benefit from skilled PT services to address functional limitations.

## 2017-10-03 NOTE — ANESTHESIA PROCEDURE NOTES
Peripheral    Patient location during procedure: post-op   Block not for primary anesthetic.  Reason for block: at surgeon's request and post-op pain management   Post-op Pain Location: right knee  Start time: 10/3/2017 9:07 AM  Timeout: 10/3/2017 9:06 AM   End time: 10/3/2017 9:08 AM  Surgery related to: right total knee arthroplasty  Staffing  Anesthesiologist: CHYNA MCCARTHY  Performed: anesthesiologist   Preanesthetic Checklist  Completed: patient identified, site marked, surgical consent, pre-op evaluation, timeout performed, IV checked, risks and benefits discussed and monitors and equipment checked  Peripheral Block  Patient position: supine  Prep: ChloraPrep  Patient monitoring: heart rate, cardiac monitor, continuous pulse ox and frequent blood pressure checks  Block type: adductor canal  Laterality: right  Injection technique: continuous  Needle  Needle type: Tuohy   Needle gauge: 19 G  Needle length: 4 in  Needle localization: ultrasound guidance  Needle insertion depth: 6 cm  Catheter type: non-stimulating  Catheter size: 19 G  Catheter at skin depth: 3 cm   -ultrasound image captured on disc.  Assessment  Injection assessment: negative aspiration, negative parasthesia and local visualized surrounding nerve  Paresthesia pain: none  Heart rate change: no  Slow fractionated injection: yes  Medications:  Bolus administered: 20 mL of 0.2 ropivacaine

## 2017-10-03 NOTE — PLAN OF CARE
Problem: Patient Care Overview  Goal: Plan of Care Review  Room air SpO2   93%. Pt with no apparent distress noted. Will continue to monitor.

## 2017-10-03 NOTE — PLAN OF CARE
Patient returning to PACU because the sink in the inpatient room is not in working order and they are currently in room repairing it.

## 2017-10-03 NOTE — H&P
H&P completed on 09/18/2017 has been reviewed, the patient has been examined and:  I concur with the findings and no changes have occurred since H&P was written.    There are no hospital problems to display for this patient.

## 2017-10-03 NOTE — OP NOTE
DATE OF PROCEDURE:  10/03/2017    PREOPERATIVE DIAGNOSIS:  DJD, right knee.    POSTOPERATIVE DIAGNOSIS:  DJD, right knee.    PROCEDURE:  Right total knee arthroplasty.    SURGEON:  Dominic Yang M.D.    ANESTHESIA:  General endotracheal.    ESTIMATED BLOOD LOSS:  None    SPECIMENS:  Synovium to Pathology.    COMPLICATIONS:  None.    TOURNIQUET TIME:  81 minutes at 250 mmHg.    INDICATIONS FOR PROCEDURE:  Mr. Gloria is a 77-year-old gentleman with advanced   DJD of the right knee admitted for the above procedure.  He has failed   conservative measures including anti-inflammatories, physical therapy, activity   modification and injections.  Surgical site marking was performed in the holding   area prior to anesthesia and timeout was performed in the Operating Room prior   to making skin incision.    PROCEDURE IN DETAIL:  The patient was taken to the Operating Room, placed on the   operating table in supine position.  After an adequate level of anesthesia was   obtained, right lower extremity was examined.  He had moderate effusion and   synovitis.  Range of motion measured 5 through 90 degrees and moderate varus   deformity was noted.  Right lower extremity was prepped and draped in usual   sterile fashion.  Preoperative prophylactic IV Ancef and tranexamic acid were   given.  Timeout was performed.  The leg was exsanguinated and tourniquet about   the proximal thigh inflated to 250 mmHg.  Longitudinal anterior midline incision   was made sharply with the scalpel and carried sharply through subcutaneous   tissue.  Medial parapatellar incision was made and the parapatella was prepared   for resurfacing by removing 9 mm of bone and cartilage and holes were drilled   for a 38 mm oval patella.  There was moderate amount of synovitis in the   suprapatellar pouch and medial and lateral gutters and synovectomy of this area   was performed and synovium was sent to pathology for inspection.  The knee was   flexed.  Deep  retractors were inserted.  Remnants of the ACL, PCL and menisci   were resected.  Starting hole was made in the distal femur with the drill and   femoral intramedullary alignment guide was inserted.  Distal femoral cut was   made for removal of 9 mm with a 5-degree valgus cut.  AP sizer was applied, size   6 gave the best fit and then the size 6 AP chamfer cutting block was applied   and cuts made and osteophytes along the distal medial, lateral and posterior   femur were resected.  Femoral  was applied and femoral box cut was   made for 6 PS femur.  PCL retractor was inserted and then the external tibial   alignment guide was applied.  Proximal tibial cut was made for removal of 2 mm   off the medial defect side.  Medial soft tissue was released and the posterior   medial tibia was performed and then the knee was checked for balance.  There was   good stability both medially and laterally in 90 degrees of flexion and full   extension.  The patella required limited lateral retinacular release in order to   allow the patella to track well in the groove.  Holes were drilled for the   femoral pegs and the tibial keel.  Trial components were all removed at this   point.  Periosteum was infiltrated with a mixture of Exparel and Marcaine and   then the joint surfaces were copiously irrigated with pulsatile lavage and   dried.  One batch of methylmethacrylate cement containing gentamicin was mixed   under vacuum and then the cement was pressurized on the tibial plateau and femur   and patellar surfaces and the femoral, tibial, and patellar components were   cemented.  Any residual excess cement was removed prior to cementing and the   distal femoral drill hole was filled with an autograft bone graft taking from   the femoral cuts.  The poly tibial insert was impacted on tibial tray and knee   brought out in full extension.  The wound was irrigated and subQ was infiltrated   with Marcaine and Exparel and the  extensor mechanism was closed in an   interrupted figure-of-eight fashion with #1 Vicryl.  Subcutaneous was closed in   a running fashion with 2-0 Vicryl and the skin was closed using a subcuticular   stitch with 3-0 Monocryl and Dermabond and Prineo tape over the incision.    Sterile bandage was then applied.  Tourniquet was deflated with total tourniquet   time 81 minutes.  The patient was awakened, moved to stretcher and taken to   Recovery Room in stable condition.  No complications.    PLAN:  Ice, early mobilization, Xarelto and SCDs for DVT prophylaxis.    COMPONENTS UTILIZED:  [employer] total knee system, size 6 right, PS cement   femur, size 7 fixed-bearing cement tibial baseplate, size 6 x 7 mm thick PS   cross-linked poly tibial insert and medialized patellar dome, size 41 cement.      REJI  dd: 10/03/2017 09:15:27 (CDT)  td: 10/03/2017 10:43:53 (CDT)  Doc ID   #[id number]  Job ID #[id number]    CC:

## 2017-10-03 NOTE — PLAN OF CARE
Report received from VAMSHI Ames. No changes in assessment. Pt denies pain. Dr Harmon at bedside to connect pain ball

## 2017-10-03 NOTE — PLAN OF CARE
"Patient sitting up in bed. AAOX3. VSS. Medicated for "mild" pain per prn orders. Dr. Garzon okay to release patient from PACU. Dr. Garzon states to have the nurse call her when ropivicaine ball arrives to floor so she can hook ball up. Spoke to pharmacy again, waiting on ropivicaine ball. Instructed pharmacy to deliver to the floor, patient going to room 527, verbalized understanding. Patients family updated and aware of patients room assignment.   "

## 2017-10-03 NOTE — PT/OT/SLP EVAL
"Physical Therapy  Evaluation    Teo Gloria   MRN: [id number]   Admitting Diagnosis: Right knee DJD    PT Received On: 10/03/17  PT Start Time: 1428     PT Stop Time: 1446    PT Total Time (min): 18 min       Billable Minutes:  Evaluation 18    Diagnosis: Right knee DJD  S/p R TKA    Past Medical History:   Diagnosis Date    Hypertension       Past Surgical History:   Procedure Laterality Date    APPENDECTOMY      BACK SURGERY      CHOLECYSTECTOMY      COLONOSCOPY  01/01/2017    repeat Q 5 years    EYE SURGERY Bilateral     cataract    HERNIA REPAIR      SHOULDER ARTHROSCOPY Bilateral     TOTAL KNEE ARTHROPLASTY Right 1960's       Referring physician: Dr. Yang  Date referred to PT: 10/3/2017    General Precautions: Standard, fall  Orthopedic Precautions: RLE weight bearing as tolerated   Braces: N/A       Do you have any cultural, spiritual, Nondenominational conflicts, given your current situation?: none reported to PT    Patient History:  Living Environment Comment: Pt reports living with his with in a [facility] with 1 LINA through threshold with WIS and shower chair. Pt has access to his spouse's equipment: RW, BSC, SPC, elevated toilet seat, shower chair, TTB. Pt only required assist for donning socks/shoes and completed remainder of mobility independently.  Equipment Currently Used at Home: none  DME owned (not currently used): rolling walker, bedside commode, shower chair, transfer tub bench and wheelchair (belong to pt's wife-wife does not use equipment anymore)    Previous Level of Function:  Ambulation Skills: independent  Transfer Skills: independent  ADL Skills: independent    Subjective:  Communicated with RN prior to session.  Pt reporting he feels fine, "just a little woozy"  Chief Complaint: R knee pain  Patient goals: to return home    Pain/Comfort  Pain Rating 1: 5/10  Location - Side 1: Right  Location 1: knee  Pain Addressed 1: Distraction, Cessation of Activity, Nurse notified, Reposition  Pain " Rating Post-Intervention 1: 5/10      Objective:   Patient found with: telemetry     Cognitive Exam:  Oriented to: Person, Place, Time and Situation    Follows Commands/attention: Follows multistep  commands  Communication: clear/fluent  Safety awareness/insight to disability: intact    Physical Exam:  Postural examination/scapula alignment: Rounded shoulder    Skin integrity: Visible skin intact and R knee ACE wrapped  Edema: Mild RLE    Sensation:   Intact    Lower Extremity Range of Motion:  Right Lower Extremity: WFL except R knee lacking ~10-20 deg from terminal knee ext  Left Lower Extremity: WFL    Lower Extremity Strength:  Right Lower Extremity: MMT not provided to knee or hip to avoid increase in pain  Left Lower Extremity: WFL     Fine motor coordination:  Intact    Gross motor coordination: WFL    Functional Mobility:  Bed Mobility:  Scooting/Bridging: Stand by Assistance  Supine to Sit: Stand by Assistance (with HOB elevated)  Sit to Supine: Stand by Assistance    Transfers:  Sit <> Stand Assistance: Contact Guard Assistance  Sit <> Stand Assistive Device: Rolling Walker  Bed <> Chair Assistance: Activity did not occur    Gait:   Gait Distance: 18 feet with RW and CGA with cues for 3-point gait pattern and UE WB to off-weight RLE  Assistance 1: Contact Guard Assistance  Gait Assistive Device: Rolling walker  Gait Pattern: 3-point gait  Gait Deviation(s): decreased tamara, increased time in double stance, decreased velocity of limb motion, decreased stride length    Balance:   Static Sit: FAIR+: Able to take MINIMAL challenges from all directions  Dynamic Sit: FAIR+: Maintains balance through MINIMAL excursions of active trunk motion  Static Stand: FAIR+: Takes MINIMAL challenges from all directions  Dynamic stand: FAIR: Needs CONTACT GUARD during gait    Therapeutic Activities and Exercises:  PT evaluation completed.    AM-PAC 6 CLICK MOBILITY  How much help from another person does this patient  currently need?   1 = Unable, Total/Dependent Assistance  2 = A lot, Maximum/Moderate Assistance  3 = A little, Minimum/Contact Guard/Supervision  4 = None, Modified [place]/Independent    Turning over in bed (including adjusting bedclothes, sheets and blankets)?: 3  Sitting down on and standing up from a chair with arms (e.g., wheelchair, bedside commode, etc.): 3  Moving from lying on back to sitting on the side of the bed?: 3  Moving to and from a bed to a chair (including a wheelchair)?: 3  Need to walk in hospital room?: 3  Climbing 3-5 steps with a railing?: 2  Total Score: 17     AM-PAC Raw Score CMS G-Code Modifier Level of Impairment Assistance   6 % Total / Unable   7 - 9 CM 80 - 100% Maximal Assist   10 - 14 CL 60 - 80% Moderate Assist   15 - 19 CK 40 - 60% Moderate Assist   20 - 22 CJ 20 - 40% Minimal Assist   23 CI 1-20% SBA / CGA   24 CH 0% Independent/ Mod I     Patient left HOB elevated with all lines intact, call button in reach and RN present.    Assessment:   Teo Gloria is a 77 y.o. male with a medical diagnosis of Right knee DJD s/p R TKA and presents with R knee pain, impaired R knee ROM, impaired functional mobility, and impaired gait mechanics. Pt will benefit from  PT/OT upon d/c. Pt reporting wanting BSC upon d/c.    Rehab identified problem list/impairments: Rehab identified problem list/impairments: weakness, impaired self care skills, impaired functional mobilty, gait instability, impaired balance, decreased safety awareness, decreased ROM, edema    Rehab potential is good.    Activity tolerance: Good    Discharge recommendations: Discharge Facility/Level Of Care Needs: home health PT, home health OT     Barriers to discharge: Barriers to Discharge: None    Equipment recommendations: Equipment Needed After Discharge: bedside commode     GOALS:    Physical Therapy Goals        Problem: Physical Therapy Goal    Goal Priority Disciplines Outcome Goal Variances  Interventions   Physical Therapy Goal     PT/OT, PT Ongoing (interventions implemented as appropriate)     Description:  Goals to be met by: 11/3/17     Patient will increase functional independence with mobility by performin. Supine <> sit with Modified [place]  2. Sit to stand transfer with Supervision  3. Bed to chair transfer with Supervision using Rolling Walker  4. Gait  x 100 feet with Supervision using Rolling Walker.   5. Lower extremity exercise program x 10 reps per handout, with supervision                      PLAN:    Patient to be seen BID to address the above listed problems via gait training, therapeutic activities, therapeutic exercises  Plan of Care expires: 17  Plan of Care reviewed with: patient    Functional Assessment Tool Used: AMPAC  Score: 17  Functional Limitation: Mobility: Walking and moving around  Mobility: Walking and Moving Around Current Status (): CK  Mobility: Walking and Moving Around Goal Status (): ADDI Ahn, PT  10/03/2017

## 2017-10-03 NOTE — PLAN OF CARE
Problem: Occupational Therapy Goal  Goal: Occupational Therapy Goal  Goals to be met by: 10/6/17     Patient will increase functional independence with ADLs by performing:    LE Dressing with Minimal Assistance.  Grooming while standing with Supervision.  Toileting from toilet with Supervision for hygiene and clothing management.   Stand pivot transfers with Supervision.  Toilet transfer to toilet with Supervision.  Upper extremity exercise program x10 reps per handout, with independence.    Outcome: Ongoing (interventions implemented as appropriate)  ,Pt would benefit from cont OT services in order to maximize functional independence. Recommending HHOT/PT at d/c as well as BSC

## 2017-10-03 NOTE — ANESTHESIA POSTPROCEDURE EVALUATION
"Anesthesia Post Evaluation    Patient: Teo Gloria    Procedure(s) Performed: Procedure(s) (LRB):  ARTHROPLASTY-KNEE (Right)    Final Anesthesia Type: general  Patient location during evaluation: PACU  Patient participation: Yes- Able to Participate  Level of consciousness: awake and alert  Post-procedure vital signs: reviewed and stable  Pain management: adequate  Airway patency: patent  PONV status at discharge: No PONV  Anesthetic complications: no      Cardiovascular status: blood pressure returned to baseline  Respiratory status: unassisted  Hydration status: euvolemic  Follow-up not needed.        Visit Vitals  BP (!) 169/72   Pulse 70   Temp 36.6 °C (97.9 °F) (Skin)   Resp 16   Ht 5' 8" (1.727 m)   Wt 97.5 kg (215 lb)   SpO2 96%   BMI 32.69 kg/m²       Pain/Klarissa Score: Pain Assessment Performed: Yes (10/3/2017  1:37 PM)  Presence of Pain: complains of pain/discomfort (10/3/2017  1:37 PM)  Pain Rating Prior to Med Admin: 6 (10/3/2017  1:26 PM)  Klarissa Score: 9 (10/3/2017  1:37 PM)      "

## 2017-10-03 NOTE — PT/OT/SLP EVAL
Occupational Therapy  Evaluation    Teo Gloria   MRN: [id number]   Admitting Diagnosis: Right knee DJD    OT Date of Treatment: 10/03/17   OT Start Time: 1429  OT Stop Time: 1447  OT Total Time (min): 18 min    Billable Minutes:  Evaluation 18    Diagnosis: Right knee DJD   Diagnoses of Pre-op examination and Right knee DJD were pertinent to this visit.      Past Medical History:   Diagnosis Date    Hypertension       Past Surgical History:   Procedure Laterality Date    APPENDECTOMY      BACK SURGERY      CHOLECYSTECTOMY      COLONOSCOPY  01/01/2017    repeat Q 5 years    EYE SURGERY Bilateral     cataract    HERNIA REPAIR      SHOULDER ARTHROSCOPY Bilateral     TOTAL KNEE ARTHROPLASTY Right 1960's           General Precautions: Standard, fall  Orthopedic Precautions: RLE weight bearing as tolerated  Braces:            Patient History:  Living Environment  Lives With: significant other  Living Arrangements: house  Living Environment Comment: Pt lives with wife in [facility], 1 step to enter, no rail, walk in shower. Pt has access to spouse's equipment RW, BSC, SPC, elevated toilet, shower chair, TTB. Required assist only for LBD socks and shoes prior to surgery   Equipment Currently Used at Home: none    Prior level of function:   Bed Mobility/Transfers: independent  Grooming: independent  Bathing: independent  Upper Body Dressing: independent  Lower Body Dressing: needs assist  Toileting: independent        Dominant hand: right    Subjective:  Communicated with nsg prior to session.     Chief Complaint: R knee pain   Patient/Family stated goals: return to PLOF     Pain/Comfort  Pain Rating 1: 5/10  Location - Side 1: Right  Location 1: knee  Pain Addressed 1: Distraction, Cessation of Activity, Nurse notified, Reposition    Objective:       Cognitive Exam:  Oriented to: Person, Place, Time and Situation  Follows Commands/attention: Follows multistep  commands  Communication: clear/fluent  Memory:  No Deficits  "noted  Safety awareness/insight to disability: intact  Coping skills/emotional control: Appropriate to situation    Visual/perceptual:  Intact    Physical Exam:  Postural examination/scapula alignment: Rounded shoulder  Skin integrity: Wound surigcal R knee  Edema: Mild surgical R knee    Sensation:   Intact    Upper Extremity Range of Motion:  Right Upper Extremity: WFL  Left Upper Extremity: WFL    Upper Extremity Strength:  Right Upper Extremity: WFL  Left Upper Extremity: WFL   Strength: good     Fine motor coordination:   Intact        Functional Mobility:  Bed Mobility:  Scooting/Bridging: Contact Guard Assistance  Supine to Sit: Contact Guard Assistance  Sit to Supine: Contact Guard Assistance    Transfers:  Sit <> Stand Assistance: Contact Guard Assistance  Sit <> Stand Assistive Device: Rolling Walker    Functional Ambulation: CGA with RW; education on sequence to decrease pain     Activities of Daily Living:       LE Dressing Level of Assistance: Maximum assistance      Balance:   Static Sit: NORMAL: No deviations seen in posture held statically  Dynamic Sit: GOOD: Maintains balance through MODERATE excursions of active trunk movement  Static Stand: FAIR+: Takes MINIMAL challenges from all directions  Dynamic stand: FAIR: Needs CONTACT GUARD during gait        AM-PAC 6 CLICK ADL  How much help from another person does this patient currently need?  1 = Unable, Total/Dependent Assistance  2 = A lot, Maximum/Moderate Assistance  3 = A little, Minimum/Contact Guard/Supervision  4 = None, Modified [place]/Independent    Putting on and taking off regular lower body clothing? : 2  Bathing (including washing, rinsing, drying)?: 3  Toileting, which includes using toilet, bedpan, or urinal? : 3  Putting on and taking off regular upper body clothing?: 4  Taking care of personal grooming such as brushing teeth?: 3  Eating meals?: 4  Total Score: 19    AM-PAC Raw Score CMS "G-Code Modifier Level of Impairment " Assistance   6 % Total / Unable   7 - 9 CM 80 - 100% Maximal Assist   10-14 CL 60 - 80% Moderate Assist   15 - 19 CK 40 - 60% Moderate Assist   20 - 22 CJ 20 - 40% Minimal Assist   23 CI 1-20% SBA / CGA   24 CH 0% Independent/ Mod I       Patient left supine with all lines intact, call button in reach and nsg present    Assessment:  Teo Gloria is a 77 y.o. male with a medical diagnosis of Right knee DJD and presents with R TKA.,Pt would benefit from cont OT services in order to maximize functional independence. Recommending HHOT/PT at d/c as well as [facility]    Rehab identified problem list/impairments: Rehab identified problem list/impairments: weakness, impaired functional mobilty, gait instability, impaired endurance, impaired balance, impaired self care skills, decreased ROM, decreased lower extremity function, orthopedic precautions    Rehab potential is good.    Activity tolerance: Good    Discharge recommendations: Discharge Facility/Level Of Care Needs: home health OT, home health PT     Barriers to discharge: Barriers to Discharge: None    Equipment recommendations: bedside commode     GOALS:    Occupational Therapy Goals        Problem: Occupational Therapy Goal    Goal Priority Disciplines Outcome Interventions   Occupational Therapy Goal     OT, PT/OT Ongoing (interventions implemented as appropriate)    Description:  Goals to be met by: 10/6/17     Patient will increase functional independence with ADLs by performing:    LE Dressing with Minimal Assistance.  Grooming while standing with Supervision.  Toileting from toilet with Supervision for hygiene and clothing management.   Stand pivot transfers with Supervision.  Toilet transfer to toilet with Supervision.  Upper extremity exercise program x10 reps per handout, with independence.                      PLAN:  Patient to be seen 5 x/week to address the above listed problems via self-care/home management, therapeutic activities, therapeutic  exercises  Plan of Care expires: 10/06/17  Plan of Care reviewed with: patient         Amanda VALDOVINOS Marco, OT  10/03/2017

## 2017-10-03 NOTE — TRANSFER OF CARE
"Anesthesia Transfer of Care Note    Patient: Teo Gloria    Procedure(s) Performed: Procedure(s) (LRB):  ARTHROPLASTY-KNEE (Right)    Patient location: PACU    Anesthesia Type: general    Transport from OR: Transported from OR on 6-10 L/min O2 by face mask with adequate spontaneous ventilation    Post pain: adequate analgesia    Post assessment: no apparent anesthetic complications and tolerated procedure well    Post vital signs: stable    Level of consciousness: awake    Nausea/Vomiting: no nausea/vomiting    Complications: none    Transfer of care protocol was followed      Last vitals:   Visit Vitals  BP (!) 130/58   Pulse 66   Temp 37.1 °C (98.8 °F) (Oral)   Resp 11   Ht 5' 8" (1.727 m)   Wt 97.5 kg (215 lb)   SpO2 96%   BMI 32.69 kg/m²     "

## 2017-10-04 VITALS
BODY MASS INDEX: 33.8 KG/M2 | SYSTOLIC BLOOD PRESSURE: 128 MMHG | DIASTOLIC BLOOD PRESSURE: 60 MMHG | HEART RATE: 61 BPM | TEMPERATURE: 97 F | HEIGHT: 68 IN | WEIGHT: 223 LBS | RESPIRATION RATE: 20 BRPM | OXYGEN SATURATION: 96 %

## 2017-10-04 PROCEDURE — 90662 IIV NO PRSV INCREASED AG IM: CPT

## 2017-10-04 PROCEDURE — 94761 N-INVAS EAR/PLS OXIMETRY MLT: CPT

## 2017-10-04 PROCEDURE — 63600175 PHARM REV CODE 636 W HCPCS

## 2017-10-04 PROCEDURE — G0008 ADMIN INFLUENZA VIRUS VAC: HCPCS

## 2017-10-04 PROCEDURE — 25000003 PHARM REV CODE 250

## 2017-10-04 PROCEDURE — 94799 UNLISTED PULMONARY SVC/PX: CPT

## 2017-10-04 PROCEDURE — 90471 IMMUNIZATION ADMIN: CPT

## 2017-10-04 PROCEDURE — 3E0234Z INTRODUCTION OF SERUM, TOXOID AND VACCINE INTO MUSCLE, PERCUTANEOUS APPROACH: ICD-10-PCS

## 2017-10-04 PROCEDURE — 97116 GAIT TRAINING THERAPY: CPT

## 2017-10-04 PROCEDURE — 97530 THERAPEUTIC ACTIVITIES: CPT

## 2017-10-04 PROCEDURE — 97110 THERAPEUTIC EXERCISES: CPT

## 2017-10-04 RX ORDER — ONDANSETRON 4 MG/1
8 TABLET, ORALLY DISINTEGRATING ORAL EVERY 8 HOURS PRN
Qty: 30 TABLET | Refills: 3
Start: 2017-10-04 | End: 2018-11-30

## 2017-10-04 RX ORDER — ONDANSETRON 8 MG/1
8 TABLET, ORALLY DISINTEGRATING ORAL ONCE
Status: COMPLETED | OUTPATIENT
Start: 2017-10-04 | End: 2017-10-04

## 2017-10-04 RX ORDER — DOCUSATE SODIUM 100 MG/1
100 CAPSULE, LIQUID FILLED ORAL EVERY 12 HOURS
Refills: 0 | COMMUNITY
Start: 2017-10-04 | End: 2018-08-02

## 2017-10-04 RX ORDER — OXYCODONE HYDROCHLORIDE 15 MG/1
15 TABLET ORAL EVERY 4 HOURS PRN
Qty: 60 TABLET | Refills: 0
Start: 2017-10-04 | End: 2018-08-02

## 2017-10-04 RX ORDER — OXYCODONE HYDROCHLORIDE 5 MG/1
15 TABLET ORAL EVERY 4 HOURS PRN
Status: DISCONTINUED | OUTPATIENT
Start: 2017-10-04 | End: 2017-10-04 | Stop reason: HOSPADM

## 2017-10-04 RX ADMIN — MUPIROCIN 1 G: 20 OINTMENT TOPICAL at 08:10

## 2017-10-04 RX ADMIN — LISINOPRIL 10 MG: 10 TABLET ORAL at 08:10

## 2017-10-04 RX ADMIN — DOCUSATE SODIUM 100 MG: 100 CAPSULE, LIQUID FILLED ORAL at 08:10

## 2017-10-04 RX ADMIN — HYDROMORPHONE HYDROCHLORIDE 0.2 MG: 1 INJECTION, SOLUTION INTRAMUSCULAR; INTRAVENOUS; SUBCUTANEOUS at 08:10

## 2017-10-04 RX ADMIN — FAMOTIDINE 20 MG: 20 TABLET ORAL at 08:10

## 2017-10-04 RX ADMIN — OXYCODONE HYDROCHLORIDE 15 MG: 5 TABLET ORAL at 11:10

## 2017-10-04 RX ADMIN — ONDANSETRON 8 MG: 8 TABLET, ORALLY DISINTEGRATING ORAL at 08:10

## 2017-10-04 RX ADMIN — INFLUENZA A VIRUSA/MICHIGAN/45/2015 X-275 (H1N1) ANTIGEN (FORMALDEHYDE INACTIVATED), INFLUENZA A VIRUS A/HONG KONG/4801/2014 X-263B (H3N2) ANTIGEN (FORMALDEHYDE INACTIVATED), AND INFLUENZA B VIRUS B/BRISBANE/60/2008 ANTIGEN (FORMALDEHYDE INACTIVATED) 0.5 ML: 60; 60; 60 INJECTION, SUSPENSION INTRAMUSCULAR at 11:10

## 2017-10-04 RX ADMIN — OXYCODONE AND ACETAMINOPHEN 2 TABLET: 5; 325 TABLET ORAL at 04:10

## 2017-10-04 RX ADMIN — CELECOXIB 200 MG: 100 CAPSULE ORAL at 08:10

## 2017-10-04 RX ADMIN — HYDROCHLOROTHIAZIDE 12.5 MG: 12.5 TABLET ORAL at 08:10

## 2017-10-04 NOTE — PROGRESS NOTES
.Pharmacy New Medication Education    Patient accepted medication education.    Pharmacy educated patient on name and purpose of medications and possible side effects, using the teach-back method.     Apap  Celebrex  Docusate  Pepcid  Lisinoprol  Hctz  Dilaudid  Meclizine  Mupirocin  Zofran  Percocet  Phenergan  Ramelteon  Xarelto  topivacaine    Learners of pharmacy medication education included:  patient    Patient +/- learner response:  teachback

## 2017-10-04 NOTE — PLAN OF CARE
Problem: Patient Care Overview  Goal: Plan of Care Review  Pt received on RA , spo2 94%. Will continue to monitor.

## 2017-10-04 NOTE — PT/OT/SLP PROGRESS
"Occupational Therapy  Treatment/Dc summary     Teo Gloria   MRN: [id number]   Admitting Diagnosis: Right knee DJD    OT Date of Treatment: 10/04/17   OT Start Time: 1119  OT Stop Time: 1136  OT Total Time (min): 17 min    Billable Minutes:  Therapeutic Activity 17    General Precautions: Standard, fall  Orthopedic Precautions: RLE weight bearing as tolerated  Braces:           Subjective:  Communicated with nsg prior to session.  Pt reporting significant pain at this time limiting tolerance for OOB axs     Pain/Comfort  Pain Rating 1: 8/10  Location - Side 1: Right  Location - Orientation 1: generalized  Location 1: knee  Pain Addressed 1: Cessation of Activity, Nurse notified  Pain Rating Post-Intervention 1: 8/10      Therapeutic Activities and Exercises:  Therapist demonstrating and educating pt on  TTB t/fs  Car t/fs  LBD dsg   Use of DME at home (BSC near bed)   Calling/coorindating with TN and informing family of HH and DME delivery     AM-PAC 6 CLICK ADL   How much help from another person does this patient currently need?   1 = Unable, Total/Dependent Assistance  2 = A lot, Maximum/Moderate Assistance  3 = A little, Minimum/Contact Guard/Supervision  4 = None, Modified [place]/Independent    Putting on and taking off regular lower body clothing? : 3  Bathing (including washing, rinsing, drying)?: 3  Toileting, which includes using toilet, bedpan, or urinal? : 3  Putting on and taking off regular upper body clothing?: 4  Taking care of personal grooming such as brushing teeth?: 3  Eating meals?: 4  Total Score: 20     AM-PAC Raw Score CMS "G-Code Modifier Level of Impairment Assistance   6 % Total / Unable   7 - 8 CM 80 - 100% Maximal Assist   9-13 CL 60 - 80% Moderate Assist   14 - 19 CK 40 - 60% Moderate Assist   20 - 22 CJ 20 - 40% Minimal Assist   23 CI 1-20% SBA / CGA   24 CH 0% Independent/ Mod I       Patient left supine with all lines intact, call button in reach, bed alarm on, nsg " notified and son present    ASSESSMENT:  Teo Gloria is a 77 y.o. male with a medical diagnosis of Right knee DJD and presents with R TKA.Pt to d/c from hospital this date. Pain limiting OOB axs at treatment time. Educated pt and family on adaptive dsg, t/fs, home safety, use of DME. Spoke with TN about DME and HH. D/c OT at this time     Rehab identified problem list/impairments: Rehab identified problem list/impairments: gait instability, impaired endurance, impaired balance, weakness, impaired functional mobilty, impaired self care skills, pain, orthopedic precautions, impaired skin, edema, decreased lower extremity function    Rehab potential is good.    Activity tolerance: Good    Discharge recommendations: Discharge Facility/Level Of Care Needs: home health PT, home health OT     Barriers to discharge: Barriers to Discharge: None    Equipment recommendations:  (equipment delivered)     GOALS:    Occupational Therapy Goals     Not on file          Multidisciplinary Problems (Resolved)        Problem: Occupational Therapy Goal    Goal Priority Disciplines Outcome Interventions   Occupational Therapy Goal   (Resolved)     OT, PT/OT Outcome(s) achieved    Description:  Goals to be met by: 10/6/17     Patient will increase functional independence with ADLs by performing:    LE Dressing with Minimal Assistance.  Grooming while standing with Supervision.  Toileting from toilet with Supervision for hygiene and clothing management.   Stand pivot transfers with Supervision.  Toilet transfer to toilet with Supervision.  Upper extremity exercise program x10 reps per handout, with independence.                      Plan:  Patient to be seen  (D/c OT ) to address the above listed problems via self-care/home management, therapeutic activities, therapeutic exercises  Plan of Care expires: 10/04/17  Plan of Care reviewed with: patient, son         Amanda Lara, OT  10/04/2017

## 2017-10-04 NOTE — PROGRESS NOTES
Follow-up With  Details  Why  Contact Info   Dominic Yang MD  On 10/18/2017  Outpatient Services, Follow up appointment 1:30pm  [address] [place], [place] [address]  [facility]  [facility] [id number] [phone]

## 2017-10-04 NOTE — PLAN OF CARE
Problem: Occupational Therapy Goal  Goal: Occupational Therapy Goal  Goals to be met by: 10/6/17     Patient will increase functional independence with ADLs by performing:    LE Dressing with Minimal Assistance.  Grooming while standing with Supervision.  Toileting from toilet with Supervision for hygiene and clothing management.   Stand pivot transfers with Supervision.  Toilet transfer to toilet with Supervision.  Upper extremity exercise program x10 reps per handout, with independence.     Outcome: Unable to achieve outcome(s) by discharge  Pt to d/c from hospital this date. Pain limiting OOB axs at treatment time. Educated pt and family on adaptive dsg, t/fs, home safety, use of DME. Spoke with TN about DME and HH. D/c OT at this time

## 2017-10-04 NOTE — PLAN OF CARE
Problem: Physical Therapy Goal  Goal: Physical Therapy Goal  Goals to be met by: 11/3/17     Patient will increase functional independence with mobility by performin. Supine <> sit with Modified [place] MET 10/4/17  2. Sit to stand transfer with Supervision MET 10/4/17  3. Bed to chair transfer with Supervision using Rolling Walker  4. Gait  x 100 feet with Supervision using Rolling Walker. MET 10/4/17  5. Lower extremity exercise program x 10 reps per handout, with supervision     Outcome: Ongoing (interventions implemented as appropriate)  Goals 1,2 and 4 met

## 2017-10-04 NOTE — PLAN OF CARE
Problem: Patient Care Overview  Goal: Plan of Care Review  Outcome: Ongoing (interventions implemented as appropriate)  Pt's SpO2 99% on RA.Pt performed incentive spirometer and reached 2L. No respiratory distress noted. Will continue to monitor SpO2.

## 2017-10-04 NOTE — PLAN OF CARE
Problem: Patient Care Overview  Goal: Plan of Care Review  Outcome: Ongoing (interventions implemented as appropriate)  Moved to room 517.  Dr casey visited prior to move   Pain level at approx 4   Dressing to knee remain dry and intact   Iv fluids not required due to tolerance of oral intake following arrival to unit  Stated has voided 2 time this evening since arrival  Safety maintained   Afebrile   Family at bedside  Orders reviewed with pt and family

## 2017-10-04 NOTE — PLAN OF CARE
10/04/17 1443   Final Note   Assessment Type Final Discharge Note   Discharge Disposition Home-Health   What phone number can be called within the next 1-3 days to see how you are doing after discharge? [phone]   Hospital Follow Up  Appt(s) scheduled? Yes   Discharge plans and expectations educations in teach back method with documentation complete? Yes   Right Care Referral Info   Post Acute Recommendation Home-care   Referral Type (Home Health)   Facility Name ([facility])   [facility] ([address] [place], [place] [id number])   [facility], [facility] ([place], [place])

## 2017-10-04 NOTE — PROGRESS NOTES
"[facility]  Orthopedics  Progress Note    Patient Name: Teo Gloria  MRN: [id number]  Admission Date: 10/3/2017  Hospital Length of Stay: 1 days  Attending Provider: Dominic Yang MD  Primary Care Provider: Fredis Hector MD  Follow-up For: Procedure(s) (LRB):  ARTHROPLASTY-KNEE (Right)    Post-Operative Day: 1 Day Post-Op  Subjective:     Principal Problem:Right knee DJD    Principal Orthopedic Problem: Rt TKA    Interval History: Mild nausea and pain overnight.  Ambulating in room    Review of patient's allergies indicates:  No Known Allergies    Current Facility-Administered Medications   Medication    acetaminophen tablet 650 mg    celecoxib capsule 200 mg    docusate sodium capsule 100 mg    famotidine tablet 20 mg    lisinopril tablet 10 mg    And    hydrochlorothiazide tablet 12.5 mg    HYDROmorphone injection 0.2 mg    influenza (FLUZONE HIGH-DOSE) vaccine 0.5 mL    meclizine tablet 25 mg    mupirocin 2 % ointment 1 g    ondansetron disintegrating tablet 8 mg    ondansetron injection 4 mg    oxycodone immediate release tablet 15 mg    promethazine (PHENERGAN) 6.25 mg in dextrose 5 % 50 mL IVPB    ramelteon tablet 8 mg    rivaroxaban tablet 10 mg    ropivacaine 0.2% ON-Q C-BLOC 400 ML (SELECT A FLOW)     Objective:     Vital Signs (Most Recent):  Temp: 97.8 °F (36.6 °C) (10/04/17 0512)  Pulse: (!) 58 (10/04/17 0734)  Resp: 19 (10/04/17 0512)  BP: 125/76 (10/04/17 0512)  SpO2: 99 % (10/04/17 0734) Vital Signs (24h Range):  Temp:  [97.8 °F (36.6 °C)-98 °F (36.7 °C)] 97.8 °F (36.6 °C)  Pulse:  [58-78] 58  Resp:  [6-23] 19  SpO2:  [90 %-99 %] 99 %  BP: (125-181)/(58-86) 125/76     Weight: 101.2 kg (223 lb)  Height: 5' 8" (172.7 cm)  Body mass index is 33.91 kg/m².      Intake/Output Summary (Last 24 hours) at 10/04/17 0753  Last data filed at 10/04/17 0512   Gross per 24 hour   Intake             1640 ml   Output              500 ml   Net             1140 ml "       Ortho/SPM Exam   Abd soft, BS+, NT  Lungs clear  Heart-RRR  Incision intact, no drainage.  Homans neg    Significant Labs:   CBC:   Recent Labs  Lab 10/03/17  0926   HCT 36.1*     All pertinent labs within the past 24 hours have been reviewed.    Significant Imaging: X-Ray: I have reviewed all pertinent results/findings and my personal findings are:  Post op Knee films.  Good alignment    Assessment/Plan:     Active Diagnoses:    Diagnosis Date Noted POA    PRINCIPAL PROBLEM:  Right knee DJD [M17.11] 10/03/2017 Yes      Problems Resolved During this Admission:    Diagnosis Date Noted Date Resolved POA   Post op TKA, stable    Plan- PT today, then discharge home with [facility] PT if stable       Dominic Yang MD  Orthopedics  [facility]

## 2017-10-04 NOTE — PT/OT/SLP PROGRESS
Physical Therapy  Treatment    Teo Gloria   MRN: [id number]   Admitting Diagnosis: Right knee DJD    PT Received On: 10/04/17  PT Start Time: 0930     PT Stop Time: 0958    PT Total Time (min): 28 min       Billable Minutes:  Gait [id number] and Therapeutic Exercise 11    Treatment Type: Treatment  PT/PTA: PTA     PTA Visit Number: 1       General Precautions: Standard, fall  Orthopedic Precautions: RLE weight bearing as tolerated   Braces: N/A    Do you have any cultural, spiritual, Congregation conflicts, given your current situation?: none reported to PT    Subjective:  Communicated with nsg prior to session.      Pain/Comfort  Pain Rating 1: 4/10  Location - Side 1: Right  Location - Orientation 1: generalized  Location 1: knee  Pain Addressed 1: Reposition, Distraction, Nurse notified  Pain Rating Post-Intervention 1: 5/10    Objective:   Patient found with: telemetry    Functional Mobility:  Bed Mobility:   Supine to Sit: Supervision    Transfers:  Sit <> Stand Assistance: Stand By Assistance  Sit <> Stand Assistive Device: Rolling Walker  Bed <> Chair Technique:  (ambulation)  Bed <> Chair Assistance: Stand By Assistance  Bed <> Chair Assistive Device: Rolling Walker    Gait:   Gait Distance: ~115' with v/c's  Assistance 1: Stand by Assistance  Gait Assistive Device: Rolling walker  Gait Pattern: 3-point gait  Gait Deviation(s): decreased tamara, decreased velocity of limb motion, decreased step length, decreased stride length, decreased weight-shifting ability    Stairs:      Balance:   Static Sit: GOOD: Takes MODERATE challenges from all directions  Dynamic Sit: GOOD: Maintains balance through MODERATE excursions of active trunk movement  Static Stand: POOR+: Needs MINIMAL assist to maintain  Dynamic stand: FAIR+: Needs CLOSE SUPERVISION during gait and is able to right self with minor LOB     Therapeutic Activities and Exercises: le supine ex's X 10-12 reps inc: ap,qs,hs,abd/add,slr with assist on R,also  seated R le self-assisted knee flexion using L le       AM-PAC 6 CLICK MOBILITY  How much help from another person does this patient currently need?   1 = Unable, Total/Dependent Assistance  2 = A lot, Maximum/Moderate Assistance  3 = A little, Minimum/Contact Guard/Supervision  4 = None, Modified [place]/Independent    Turning over in bed (including adjusting bedclothes, sheets and blankets)?: 3  Sitting down on and standing up from a chair with arms (e.g., wheelchair, bedside commode, etc.): 3  Moving from lying on back to sitting on the side of the bed?: 3  Moving to and from a bed to a chair (including a wheelchair)?: 3  Need to walk in hospital room?: 3  Climbing 3-5 steps with a railing?: 2  Total Score: 17    AM-PAC Raw Score CMS G-Code Modifier Level of Impairment Assistance   6 % Total / Unable   7 - 9 CM 80 - 100% Maximal Assist   10 - 14 CL 60 - 80% Moderate Assist   15 - 19 CK 40 - 60% Moderate Assist   20 - 22 CJ 20 - 40% Minimal Assist   23 CI 1-20% SBA / CGA   24 CH 0% Independent/ Mod I     Patient left up in chair with all lines intact, call button in reach, nsg notified and son present.    Assessment: improving mobility and endurance,pt will benefit from  services upon discharge home  Teo Gloria is a 77 y.o. male with a medical diagnosis of Right knee DJD and presents with .    Rehab identified problem list/impairments: Rehab identified problem list/impairments: weakness, impaired endurance, impaired functional mobilty, gait instability, impaired balance, decreased lower extremity function, decreased ROM, impaired coordination, impaired skin, orthopedic precautions    Rehab potential is excellent.    Activity tolerance: Good    Discharge recommendations: Discharge Facility/Level Of Care Needs: home health PT, home health OT     Barriers to discharge: Barriers to Discharge: None    Equipment recommendations: Equipment Needed After Discharge: walker, rolling, bedside commode      GOALS: see general POC   Physical Therapy Goals        Problem: Physical Therapy Goal    Goal Priority Disciplines Outcome Goal Variances Interventions   Physical Therapy Goal     PT/OT, PT Ongoing (interventions implemented as appropriate)     Description:  Goals to be met by: 11/3/17     Patient will increase functional independence with mobility by performin. Supine <> sit with Modified [place]  2. Sit to stand transfer with Supervision  3. Bed to chair transfer with Supervision using Rolling Walker  4. Gait  x 100 feet with Supervision using Rolling Walker.   5. Lower extremity exercise program x 10 reps per handout, with supervision                      PLAN:    Patient to be seen BID (M-F)  to address the above listed problems via gait training, therapeutic activities, therapeutic exercises  Plan of Care expires: 17  Plan of Care reviewed with: patient, son         Juan J CABEZAS Isacc, PTA  10/04/2017

## 2017-10-04 NOTE — PT/OT/SLP PROGRESS
Physical Therapy  Treatment    Teo Gloria   MRN: [id number]   Admitting Diagnosis: Right knee DJD    PT Received On: 10/04/17  PT Start Time: 1201     PT Stop Time: 1227    PT Total Time (min): 26 min       Billable Minutes:  Gait [id number] and Therapeutic Exercise 10    Treatment Type: Treatment  PT/PTA: PTA     PTA Visit Number: 1       General Precautions: Standard, fall  Orthopedic Precautions: RLE weight bearing as tolerated   Braces: N/A    Do you have any cultural, spiritual, Confucianism conflicts, given your current situation?: none reported to PT    Subjective:  Communicated with nsg prior to session.      Pain/Comfort  Pain Rating 1:  (no rating)  Location - Side 1: Right  Location - Orientation 1: generalized  Location 1: knee  Pain Addressed 1: Pre-medicate for activity, Reposition, Distraction  Pain Rating Post-Intervention 1: 5/10    Objective:   Patient found with: telemetry    Functional Mobility:  Bed Mobility:   Supine to Sit: Modified Independent    Transfers:  Sit <> Stand Assistance: Supervision  Sit <> Stand Assistive Device: Rolling Walker  Bed <> Chair Technique:  (ambulation)  Bed <> Chair Assistance: Stand By Assistance  Bed <> Chair Assistive Device: Rolling Walker    Gait:   Gait Distance: ~175' WBAT  Assistance 1: Supervision  Gait Assistive Device: Rolling walker  Gait Pattern: 3-point gait  Gait Deviation(s): decreased tamara, decreased velocity of limb motion, decreased step length, decreased stride length    Stairs:      Balance:   Static Sit: GOOD: Takes MODERATE challenges from all directions  Dynamic Sit: GOOD: Maintains balance through MODERATE excursions of active trunk movement  Static Stand: POOR+: Needs MINIMAL assist to maintain  Dynamic stand: GOOD: Needs SUPERVISION only during gait and able to self right with moderate      Therapeutic Activities and Exercises: le supine ex's X 15 reps inc: ap,qs,hs,abd/add,checked adjustment on pt's RW,OK       AM-PAC 6 CLICK  MOBILITY  How much help from another person does this patient currently need?   1 = Unable, Total/Dependent Assistance  2 = A lot, Maximum/Moderate Assistance  3 = A little, Minimum/Contact Guard/Supervision  4 = None, Modified [place]/Independent    Turning over in bed (including adjusting bedclothes, sheets and blankets)?: 3  Sitting down on and standing up from a chair with arms (e.g., wheelchair, bedside commode, etc.): 3  Moving from lying on back to sitting on the side of the bed?: 4  Moving to and from a bed to a chair (including a wheelchair)?: 3  Need to walk in hospital room?: 3  Climbing 3-5 steps with a railing?: 2  Total Score: 18    AM-PAC Raw Score CMS G-Code Modifier Level of Impairment Assistance   6 % Total / Unable   7 - 9 CM 80 - 100% Maximal Assist   10 - 14 CL 60 - 80% Moderate Assist   15 - 19 CK 40 - 60% Moderate Assist   20 - 22 CJ 20 - 40% Minimal Assist   23 CI 1-20% SBA / CGA   24 CH 0% Independent/ Mod I     Patient left EOB with lunch tray with call button in reach and nsg notified.    Assessment: pt moving well and appears ready for discharge home with HH services  Teo Gloria is a 77 y.o. male with a medical diagnosis of Right knee DJD and presents with .    Rehab identified problem list/impairments: Rehab identified problem list/impairments: weakness, impaired endurance, impaired functional mobilty, gait instability, impaired balance, decreased lower extremity function, pain, decreased ROM, impaired coordination, orthopedic precautions    Rehab potential is excellent.    Activity tolerance: Good    Discharge recommendations: Discharge Facility/Level Of Care Needs: home health PT, home health OT     Barriers to discharge: Barriers to Discharge: None    Equipment recommendations: Equipment Needed After Discharge:  (equipment delivered)     GOALS: see general POC   Physical Therapy Goals        Problem: Physical Therapy Goal    Goal Priority Disciplines Outcome Goal  Variances Interventions   Physical Therapy Goal     PT/OT, PT Ongoing (interventions implemented as appropriate)     Description:  Goals to be met by: 11/3/17     Patient will increase functional independence with mobility by performin. Supine <> sit with Modified [place] MET 10/4/17  2. Sit to stand transfer with Supervision MET 10/4/17  3. Bed to chair transfer with Supervision using Rolling Walker  4. Gait  x 100 feet with Supervision using Rolling Walker. MET 10/4/17  5. Lower extremity exercise program x 10 reps per handout, with supervision                       PLAN:    Patient to be seen BID (M-F)  to address the above listed problems via gait training, therapeutic activities, therapeutic exercises  Plan of Care expires: 17  Plan of Care reviewed with: patient, son         Juan J CABEZAS Isacc, PTA  10/04/2017

## 2017-10-04 NOTE — PROGRESS NOTES
Rolling walker delivered to bedside by [employer] and bedside commode delivered to bedside by [facility] today.  Home health accepted by [facility] for start of care tomorrow.  Discharge medications delivered to bedside by pharmacy.  No other needs identified at this time.

## 2017-10-04 NOTE — PLAN OF CARE
met with patient at bedside and completed a discharge assessment.  Patient informed  that when he stated he has a rolling walker and bedside commode, it was not his equipment.  The equipment belonged to his wife.   said she can order him a walker and bedside commode.   spoke with patient and son regarding bed pharmacy delivery.  They request to have medications delivered to bedside.   said she will take prescriptions (xarelto, oxycodone, zofran) to [facility].       delivered prescription to [facility] for delivery to bedside.     faxed rolling walker and bedside commode prescription to [facility].    Home health referral sent [facility] via [employer]   will send orders when they are visible in the system.       10/04/17 0910   Discharge Assessment   Assessment Type Discharge Planning Assessment   Confirmed/corrected address and phone number on facesheet? Yes   Assessment information obtained from? Patient;Other  ( met with patient and son, Jordan Gloria at bedside.)   Expected Length of Stay (days) 1   Communicated expected length of stay with patient/caregiver yes  (Patient and son are aware that discharge is scheduled for today.)   Prior to hospitilization cognitive status: Alert/Oriented   Prior to hospitalization functional status: Independent   Current cognitive status: Alert/Oriented   Current Functional Status: Needs Assistance   Facility Arrived From: (Home)   Lives With child(nelli), adult  (Patient reported his daughter, Qing lives with him. )   Able to Return to Prior Arrangements yes   Is patient able to care for self after discharge? Yes   Who are your caregiver(s) and their phone number(s)? (Son, Jordan Sams [phone] and Sister, Qing [phone])   Patient's perception of discharge disposition home health  (Patient reported [facility] as his  preference for home health.)   Readmission Within The Last 30 Days no previous admission in last 30 days   Patient currently being followed by outpatient case management? No   Patient currently receives any other outside agency services? No   Equipment Currently Used at Home none   Do you have any problems affording any of your prescribed medications? No   Is the patient taking medications as prescribed? yes   Does the patient have transportation home? Yes  (Son is present at bedside to provide transportation.)   Transportation Available car;family or friend will provide   Does the patient receive services at the [facility]? No   Discharge Plan A Home with family;Home   Discharge Plan B Home with family;Home   Patient/Family In Agreement With Plan yes   Does the patient have transportation to healthcare appointments? Yes   Readmission Questionnaire   Living Arrangements house

## 2017-10-05 ENCOUNTER — PATIENT OUTREACH (OUTPATIENT)
Dept: ADMINISTRATIVE | Facility: CLINIC | Age: 77
End: 2017-10-05

## 2017-10-05 NOTE — PATIENT INSTRUCTIONS
Discharge Instructions for Total Knee Replacement  You have undergone knee replacement surgery. The knee joint forms where the thighbone, shinbone, and kneecap meet. The knee joint is supported by muscles and ligaments, and is lined with a cushioning called cartilage. Over time, cartilage wears away. This can make the knee feel stiff and painful. Your doctor replaced your painful joint with an artificial joint to relieve pain and restore movement. Here are some instructions to follow once at home.  Home care  · When your doctor says it's OK to shower, carefully wash your incision with soap and water. Rinse the incision well. Then gently pat it dry. Dont rub the incision, or apply creams or lotions. Sit on a shower stool or chair when you shower to keep from falling.  · Take pain medicine as directed by your doctor.  Sitting and sleeping  · Sit in chairs with arms. The arms make it easier for you to stand up or sit down.  · Dont sit for more than 30 to 45 minutes at one time.  · Nap if you are tired, but dont stay in bed all day.  · Sleep with a pillow under your ankle, not your knee. Be sure to change the position of your leg during the night.  Moving safely  · The key to successful recovery is movement with walking and exercising your knee as directed by your doctor. You should be able to start moving your leg shortly after surgery as directed by your doctor.    · Walk up and down stairs with support. Try one step at a time. Use the railing if possible.  · Dont drive until your doctor says its OK. Most people can start driving about 6 weeks after surgery. Dont drive while you are taking opioid pain medication.  Other precautions  · Avoid soaking your knee in water (no hot tubs, bathtubs, swimming pools) until your doctor says its OK.  · Wear the support stockings you were given in the hospital, as instructed by your doctor. You may wear these stockings for 4 to 6 weeks after surgery. If needed, you can  place a bandage over the incision to prevent irritation from clothing or support stockings.  · Arrange your household to keep the items you need handy. Keep everything else out of the way. Remove items that may cause you to fall, such as throw rugs and electrical cords.  · Use nonslip bath mats, grab bars, an elevated toilet seat, and a shower chair in your bathroom.  · Until your balance, flexibility, and strength improve, use a cane, crutches, a walker, handrails, or someone to help you.  · Keep your hands free by using a backpack, kamla pack, apron, or pockets to carry things.  · Prevent infection. Ask your doctor for instructions if you havent already received them. Any infection will need to be treated immediately. Call your doctor right away if you think you might have an infection.  · Tell your dentist that you have an artificial joint and take antibiotics as prescribed before any dental work.  · Tell all your healthcare providers about your artificial joint before any medical procedure.  · Maintain a healthy weight. Get help to lose any extra pounds. Added body weight puts stress on the knee.  · Take any medicine you may have been given after surgery. This may include blood-thinning medicine to prevent blood clots or antibiotics to prevent infection.  Follow-up care  Follow up with your healthcare provider, or as advised. You will need to have your staples removed 2 to 3 weeks after surgery.     When to call your healthcare provider  Call 911 right away if you have:  · Chest pain  · Shortness of breath  · Any pain or tenderness in your calf  Otherwise, call your healthcare provider right away if you have:  · Fever of 100.4 °F (38 °C) or higher, or as advised  · Shaking chills  · Stiffness, or inability to move the knee  · Increased swelling in your leg  · Increased redness, tenderness, or swelling in or around the knee incision  · Drainage from the knee incision  · Increased knee pain   Date Last Reviewed:  7/1/2017  © 0977-1331 [employer]. [address] [place] [address], [place] [address]. All rights reserved. This information is not intended as a substitute for professional medical care. Always follow your healthcare professional's instructions.

## 2017-10-05 NOTE — PT/OT/SLP DISCHARGE
Physical Therapy Discharge Summary    Teo Gloria  MRN: [id number]   Right knee DJD   Patient Discharged from acute Physical Therapy on 10/4/2017 .  Please refer to prior PT noted date on 10/4/2017 for functional status.     Assessment:   Patient appropriate for care in another setting.  GOALS:    Physical Therapy Goals     Not on file          Multidisciplinary Problems (Resolved)        Problem: Physical Therapy Goal    Goal Priority Disciplines Outcome Goal Variances Interventions   Physical Therapy Goal   (Resolved)     PT/OT, PT Outcome(s) achieved     Description:  Goals to be met by: 11/3/17     Patient will increase functional independence with mobility by performin. Supine <> sit with Modified [place] MET 10/4/17  2. Sit to stand transfer with Supervision MET 10/4/17  3. Bed to chair transfer with Supervision using Rolling Walker  4. Gait  x 100 feet with Supervision using Rolling Walker. MET 10/4/17  5. Lower extremity exercise program x 10 reps per handout, with supervision                     Reasons for Discontinuation of Therapy Services  Transfer to alternate level of care.      Plan:  Patient Discharged to: Home with Home Health Service.

## 2017-10-06 ENCOUNTER — NURSE TRIAGE (OUTPATIENT)
Dept: ADMINISTRATIVE | Facility: CLINIC | Age: 77
End: 2017-10-06

## 2017-10-06 NOTE — TELEPHONE ENCOUNTER
Reason for Disposition   Caller has NON-URGENT question and triager unable to answer question    Protocols used: ST POST-OP SYMPTOMS AND [id number]    Son is calling to report Teo had fever last night of 100.4.  Did take Tylenol.  Last dose of Tylenol was 4 AM.  This AM temp is 98.4.  Teo is also complaining of flank pain on the right side but only when he is laying down.  Son also reports Teo has not had a BM since surgery.  Return call placed to Teo to advise to call office at 8:30 when they open to notify of temp, back pain and no BM.  Teo verbalized understanding and states will call office when open.

## 2017-10-08 ENCOUNTER — NURSE TRIAGE (OUTPATIENT)
Dept: ADMINISTRATIVE | Facility: CLINIC | Age: 77
End: 2017-10-08

## 2017-10-08 NOTE — TELEPHONE ENCOUNTER
Reason for Disposition   Caller has URGENT question and triager unable to answer question    Protocols used: ST POST-OP SYMPTOMS AND [id number]    Son calling with concerns of Pt having swelling to leg surgery was performed.  Contacted MD office, spoke to answering service and left message for On Call MD.  Called Son to inform of message sent to On Call MD; Son spoke to Home Health Nurse-Pt on the way to ED.

## 2017-10-12 NOTE — DISCHARGE SUMMARY
[facility]  General Surgery  Discharge Summary      Patient Name: Teo Gloria  MRN: [id number]  Admission Date: 10/3/2017  Hospital Length of Stay: 1 days  Discharge Date and Time: 10/4/2017  3:19 PM  Attending Physician: No att. providers found   Discharging Provider: Dominic Yang MD  Primary Care Provider: Fredis Hector MD     HPI: Admitted for TKA for tx djd knee    Procedure(s) (LRB):  ARTHROPLASTY-KNEE (Right)     Hospital Course: After surgery, admitted for pain control, PT and monitor NV status.  Improved quickly and at time of discharge, ambulating with walker and tolerating diet and pain controlled.    Consults: PT, OT, RT    Significant Diagnostic Studies: Labs: CBC No results for input(s): WBC, HGB, HCT, PLT in the last 48 hours.    Pending Diagnostic Studies:     None        Final Active Diagnoses:    Diagnosis Date Noted POA    PRINCIPAL PROBLEM:  Right knee DJD [M17.11] 10/03/2017 Yes      Problems Resolved During this Admission:    Diagnosis Date Noted Date Resolved POA      Discharged Condition: stable    Disposition: Home or Self Care    Follow Up:  Follow-up Information     Dominic Yang MD On 10/18/2017.    Specialty:  Orthopedic Surgery  Why:  Outpatient Services, Follow up appointment 1:30pm  Contact information:  [address]  [address]  [facility] [id number] [phone]             [facility] On 10/4/2017.    Specialties:  Home Health Services, Physical Therapy, Occupational Therapy  Why:  Home Health  Contact information:  [address]  [address]  [facility] [address]  [phone]                 Patient Instructions:     Referral to Home health   Referral Priority: Routine Referral Type: Home Health   Referral Reason: Specialty Services Required    Requested Specialty: Home Health Services    Number of Visits Requested: 1      Diet general     Shower on day dressing removed (No bath)     Ice to affected area     Weight  bearing as tolerated     No driving, operating heavy equipment or signing legal documents while taking pain medication     Call MD for:  temperature >100.4     Call MD for:  persistent nausea and vomiting     Call MD for:  severe uncontrolled pain     Call MD for:  difficulty breathing, headache or visual disturbances     Call MD for:  redness, tenderness, or signs of infection (pain, swelling, redness, odor or green/yellow discharge around incision site)     Call MD for:  hives     Call MD for:  persistent dizziness or light-headedness     Call MD for:  extreme fatigue     No dressing needed       Medications:  Reconciled Home Medications:   Discharge Medication List as of 10/4/2017  2:10 PM      START taking these medications    Details   docusate sodium (COLACE) 100 MG capsule Take 1 capsule (100 mg total) by mouth every 12 (twelve) hours., Starting Wed 10/4/2017, OTC      ondansetron (ZOFRAN-ODT) 4 MG TbDL Take 2 tablets (8 mg total) by mouth every 8 (eight) hours as needed., Starting Wed 10/4/2017, No Print      oxycodone (ROXICODONE) 15 MG Tab Take 1 tablet (15 mg total) by mouth every 4 (four) hours as needed., Starting Wed 10/4/2017, No Print      rivaroxaban (XARELTO) 10 mg Tab Take 1 tablet (10 mg total) by mouth daily with dinner or evening meal., Starting Wed 10/4/2017, Until Mon 10/16/2017, No Print         CONTINUE these medications which have NOT CHANGED    Details   lisinopril-hydrochlorothiazide (PRINZIDE,ZESTORETIC) 10-12.5 mg per tablet TAKE 1 TABLET BY MOUTH EVERY DAY, Normal      meclizine (ANTIVERT) 25 mg tablet Take 1 tablet (25 mg total) by mouth 3 (three) times daily as needed., Starting Mon 9/25/2017, Normal         STOP taking these medications       hydrocodone-ibuprofen 7.5-200 mg (VICOPROFEN) 7.5-200 mg per tablet Comments:   Reason for Stopping:               Dominic Yang MD  General Surgery  [facility]

## 2017-10-18 ENCOUNTER — OFFICE VISIT (OUTPATIENT)
Dept: FAMILY MEDICINE | Facility: CLINIC | Age: 77
End: 2017-10-18
Payer: MEDICARE

## 2017-10-18 VITALS
TEMPERATURE: 99 F | HEART RATE: 88 BPM | SYSTOLIC BLOOD PRESSURE: 134 MMHG | WEIGHT: 204.63 LBS | BODY MASS INDEX: 31.11 KG/M2 | OXYGEN SATURATION: 98 % | DIASTOLIC BLOOD PRESSURE: 72 MMHG | RESPIRATION RATE: 15 BRPM

## 2017-10-18 DIAGNOSIS — G47.00 INSOMNIA, UNSPECIFIED TYPE: Primary | ICD-10-CM

## 2017-10-18 PROCEDURE — 99213 OFFICE O/P EST LOW 20 MIN: CPT | Mod: S$GLB,,, | Performed by: NURSE PRACTITIONER

## 2017-10-18 NOTE — PROGRESS NOTES
Subjective:       Patient ID: Teo Gloria is a 77 y.o. male.    Chief Complaint: Anxiety    Anxiety   Presents for initial visit. Onset was 1 to 4 weeks ago (just had surgery daughter wanted him to come in). The problem has been unchanged. Symptoms include insomnia, irritability and nervous/anxious behavior. Patient reports no chest pain, compulsions, confusion, decreased concentration, depressed mood, dizziness, dry mouth, excessive worry, feeling of choking, hyperventilation, impotence, malaise, muscle tension, nausea, obsessions, palpitations, panic, restlessness, shortness of breath or suicidal ideas. Symptoms occur most days. Exacerbated by: just had surgery, wife is in nursing home. The quality of sleep is poor. Nighttime awakenings: several.     Risk factors: just had surgery wife in nursing home. There is no history of anemia, anxiety/panic attacks, arrhythmia, asthma, bipolar disorder, CAD, CHF, chronic lung disease, depression, fibromyalgia, hyperthyroidism or suicide attempts. Treatments tried: xanax. The treatment provided no relief.     Review of Systems   Constitutional: Positive for irritability. Negative for chills, diaphoresis, fatigue and fever.   Eyes: Negative for photophobia and visual disturbance.   Respiratory: Negative for chest tightness, shortness of breath and wheezing.    Cardiovascular: Negative for chest pain, palpitations and leg swelling.   Gastrointestinal: Negative for nausea.   Genitourinary: Negative for impotence.   Neurological: Negative for dizziness.   Psychiatric/Behavioral: Positive for sleep disturbance. Negative for confusion, decreased concentration and suicidal ideas. The patient is nervous/anxious and has insomnia.        Objective:      Physical Exam   Constitutional: He appears well-developed and well-nourished. No distress.   HENT:   Right Ear: External ear normal.   Left Ear: External ear normal.   Cardiovascular: Normal rate, regular rhythm and normal heart  sounds.    Pulmonary/Chest: Effort normal and breath sounds normal. No respiratory distress. He has no wheezes.   Musculoskeletal:   Surgical wound to right knee healing  Ambulating with cane   Skin: Skin is warm and dry. He is not diaphoretic.   Psychiatric: He has a normal mood and affect. His behavior is normal. Judgment and thought content normal.   Vitals reviewed.      Assessment:       1. Insomnia, unspecified type        Plan:       Insomnia, unspecified type      Dr Johnson called him in something to help him sleep states he is going to try that was using xanax states it did not help and he does not want to take anything addictive  He does not know the name of the medicine Dr. Johnson called in  instructed to call if medicine did not work

## 2017-10-25 ENCOUNTER — TELEPHONE (OUTPATIENT)
Dept: FAMILY MEDICINE | Facility: CLINIC | Age: 77
End: 2017-10-25

## 2017-10-25 RX ORDER — CITALOPRAM 10 MG/1
10 TABLET ORAL DAILY
Qty: 30 TABLET | Refills: 0 | Status: SHIPPED | OUTPATIENT
Start: 2017-10-25 | End: 2018-08-02

## 2017-10-25 NOTE — TELEPHONE ENCOUNTER
----- Message from Liz Duff sent at 10/25/2017  8:27 AM CDT -----  Contact: daughter Qing [phone]  Calling on patient's behalf. Father has decided that he does want the anti-depressant  Can you call something in to [facility]

## 2018-01-16 NOTE — TELEPHONE ENCOUNTER
Daughter called would like something for anxiety for her dad when I saw him he had xanax did not like how that made him feel celexa sent to the pharmacy. Explained to her that it might take up to 4 weeks for full effect. RTC in one month    
n/a

## 2018-08-02 ENCOUNTER — OFFICE VISIT (OUTPATIENT)
Dept: FAMILY MEDICINE | Facility: CLINIC | Age: 78
End: 2018-08-02
Payer: MEDICARE

## 2018-08-02 VITALS
HEIGHT: 68 IN | OXYGEN SATURATION: 96 % | DIASTOLIC BLOOD PRESSURE: 76 MMHG | BODY MASS INDEX: 32.01 KG/M2 | SYSTOLIC BLOOD PRESSURE: 132 MMHG | WEIGHT: 211.19 LBS | HEART RATE: 58 BPM | TEMPERATURE: 98 F

## 2018-08-02 DIAGNOSIS — M54.41 ACUTE RIGHT-SIDED LOW BACK PAIN WITH RIGHT-SIDED SCIATICA: Primary | ICD-10-CM

## 2018-08-02 PROCEDURE — 99213 OFFICE O/P EST LOW 20 MIN: CPT | Mod: 25,S$GLB,, | Performed by: NURSE PRACTITIONER

## 2018-08-02 PROCEDURE — 96372 THER/PROPH/DIAG INJ SC/IM: CPT | Mod: S$GLB,,, | Performed by: NURSE PRACTITIONER

## 2018-08-02 RX ORDER — KETOROLAC TROMETHAMINE 30 MG/ML
30 INJECTION, SOLUTION INTRAMUSCULAR; INTRAVENOUS
Status: COMPLETED | OUTPATIENT
Start: 2018-08-02 | End: 2018-08-02

## 2018-08-02 RX ORDER — TRIAMCINOLONE ACETONIDE 40 MG/ML
80 INJECTION, SUSPENSION INTRA-ARTICULAR; INTRAMUSCULAR
Status: COMPLETED | OUTPATIENT
Start: 2018-08-02 | End: 2018-08-02

## 2018-08-02 RX ORDER — METHOCARBAMOL 500 MG/1
500 TABLET, FILM COATED ORAL 2 TIMES DAILY PRN
Qty: 30 TABLET | Refills: 0 | Status: SHIPPED | OUTPATIENT
Start: 2018-08-02 | End: 2018-08-12

## 2018-08-02 RX ORDER — LISINOPRIL AND HYDROCHLOROTHIAZIDE 10; 12.5 MG/1; MG/1
1 TABLET ORAL DAILY
Qty: 90 TABLET | Refills: 3 | Status: SHIPPED | OUTPATIENT
Start: 2018-08-02 | End: 2019-07-31 | Stop reason: SDUPTHER

## 2018-08-02 RX ADMIN — KETOROLAC TROMETHAMINE 30 MG: 30 INJECTION, SOLUTION INTRAMUSCULAR; INTRAVENOUS at 01:08

## 2018-08-02 RX ADMIN — TRIAMCINOLONE ACETONIDE 80 MG: 40 INJECTION, SUSPENSION INTRA-ARTICULAR; INTRAMUSCULAR at 01:08

## 2018-08-02 NOTE — PROGRESS NOTES
Subjective:       Patient ID: Teo Gloria is a 78 y.o. male.    Chief Complaint: Back Pain (radiating to right leg)    Back Pain   This is a new problem. The current episode started yesterday. The problem occurs constantly. The problem is unchanged. The pain is present in the lumbar spine. The quality of the pain is described as aching. Radiates to: down right leg. The pain is at a severity of 10/10. The pain is moderate. The pain is the same all the time. Exacerbated by: stand/walk. Associated symptoms include leg pain. Pertinent negatives include no abdominal pain, bladder incontinence, bowel incontinence, chest pain, dysuria, fever, headaches, numbness, paresis, paresthesias, pelvic pain, perianal numbness, tingling, weakness or weight loss. Treatments tried: robaxin, advil. The treatment provided no relief.     Review of Systems   Constitutional: Negative for chills, diaphoresis, fatigue, fever and weight loss.   Eyes: Negative for photophobia and visual disturbance.   Respiratory: Negative for cough, chest tightness and wheezing.    Cardiovascular: Negative for chest pain, palpitations and leg swelling.   Gastrointestinal: Negative for abdominal pain and bowel incontinence.   Genitourinary: Negative for bladder incontinence, dysuria and pelvic pain.   Musculoskeletal: Positive for back pain.        Right side low back radiates down right leg     Skin: Negative for color change, pallor and rash.   Neurological: Negative for tingling, weakness, numbness, headaches and paresthesias.       Objective:      Physical Exam   Constitutional: He is oriented to person, place, and time. He appears well-developed and well-nourished. No distress.   HENT:   Right Ear: External ear normal.   Left Ear: External ear normal.   Cardiovascular: Normal rate, regular rhythm and normal heart sounds.    No murmur heard.  Pulmonary/Chest: Effort normal and breath sounds normal. No respiratory distress. He has no wheezes.    Musculoskeletal: Normal range of motion. He exhibits no edema, tenderness or deformity.        Back:    Neurological: He is alert and oriented to person, place, and time.   Skin: Skin is warm and dry. He is not diaphoretic.   Psychiatric: He has a normal mood and affect. His behavior is normal. Judgment and thought content normal.   Vitals reviewed.      Assessment:       1. Acute right-sided low back pain with right-sided sciatica        Plan:       Acute right-sided low back pain with right-sided sciatica  -     triamcinolone acetonide injection 80 mg; Inject 2 mLs (80 mg total) into the muscle one time.  -     ketorolac injection 30 mg; Inject 1 mL (30 mg total) into the muscle one time.    Other orders  -     lisinopril-hydrochlorothiazide (PRINZIDE,ZESTORETIC) 10-12.5 mg per tablet; Take 1 tablet by mouth once daily.  Dispense: 90 tablet; Refill: 3      Warm moist heat to back

## 2018-10-31 ENCOUNTER — EXTERNAL CHRONIC CARE MANAGEMENT (OUTPATIENT)
Dept: PRIMARY CARE CLINIC | Facility: CLINIC | Age: 78
End: 2018-10-31
Payer: MEDICARE

## 2018-10-31 PROCEDURE — 99490 CHRNC CARE MGMT STAFF 1ST 20: CPT | Mod: S$PBB,,, | Performed by: FAMILY MEDICINE

## 2018-10-31 PROCEDURE — 99490 CHRNC CARE MGMT STAFF 1ST 20: CPT | Mod: PBBFAC | Performed by: FAMILY MEDICINE

## 2018-11-24 ENCOUNTER — OUTSIDE PLACE OF SERVICE (OUTPATIENT)
Dept: CARDIOLOGY | Facility: CLINIC | Age: 78
End: 2018-11-24
Payer: MEDICARE

## 2018-11-24 PROCEDURE — 93010 ELECTROCARDIOGRAM REPORT: CPT | Mod: S$GLB,,, | Performed by: INTERNAL MEDICINE

## 2018-11-26 ENCOUNTER — TELEPHONE (OUTPATIENT)
Dept: FAMILY MEDICINE | Facility: CLINIC | Age: 78
End: 2018-11-26

## 2018-11-26 NOTE — TELEPHONE ENCOUNTER
----- Message from Alicia Ahn sent at 11/26/2018  9:12 AM CST -----  Contact: [phone]  Patient would like to be seen this week for an ER f/u for a UTI. Please advise.

## 2018-11-26 NOTE — TELEPHONE ENCOUNTER
See message below   [facility] ER - Saturday evening -   Feeling tired  Fluids given at hospital   And sent home on oral abx -   nitrofurantoin 1 BID x 7 days  Cipro 500 mg 1 BID x 7days    I called for ER report and UA and CNS report - I will place in your basket for you to review  And advise

## 2018-11-30 ENCOUNTER — OFFICE VISIT (OUTPATIENT)
Dept: FAMILY MEDICINE | Facility: CLINIC | Age: 78
End: 2018-11-30
Payer: MEDICARE

## 2018-11-30 ENCOUNTER — EXTERNAL CHRONIC CARE MANAGEMENT (OUTPATIENT)
Dept: PRIMARY CARE CLINIC | Facility: CLINIC | Age: 78
End: 2018-11-30
Payer: MEDICARE

## 2018-11-30 VITALS
WEIGHT: 205.94 LBS | OXYGEN SATURATION: 97 % | HEART RATE: 73 BPM | SYSTOLIC BLOOD PRESSURE: 126 MMHG | DIASTOLIC BLOOD PRESSURE: 72 MMHG | BODY MASS INDEX: 31.21 KG/M2 | TEMPERATURE: 98 F | HEIGHT: 68 IN

## 2018-11-30 DIAGNOSIS — I45.10 RIGHT BUNDLE BRANCH BLOCK: ICD-10-CM

## 2018-11-30 DIAGNOSIS — I49.9 IRREGULAR HEARTBEAT: ICD-10-CM

## 2018-11-30 DIAGNOSIS — I10 ESSENTIAL HYPERTENSION: Primary | ICD-10-CM

## 2018-11-30 PROCEDURE — 99213 OFFICE O/P EST LOW 20 MIN: CPT | Mod: S$GLB,,, | Performed by: FAMILY MEDICINE

## 2018-11-30 PROCEDURE — 99490 CHRNC CARE MGMT STAFF 1ST 20: CPT | Mod: PBBFAC | Performed by: FAMILY MEDICINE

## 2018-11-30 PROCEDURE — 99490 CHRNC CARE MGMT STAFF 1ST 20: CPT | Mod: S$PBB,,, | Performed by: FAMILY MEDICINE

## 2018-11-30 RX ORDER — CEPHALEXIN 500 MG/1
CAPSULE ORAL
COMMUNITY
End: 2018-12-20

## 2018-11-30 RX ORDER — NITROFURANTOIN 25; 75 MG/1; MG/1
CAPSULE ORAL
Refills: 0 | COMMUNITY
Start: 2018-11-25 | End: 2018-12-20

## 2018-12-01 NOTE — PROGRESS NOTES
Patient ID: Teo Gloria is a 78 y.o. male.    Chief Complaint: Follow up; ER visit due to UTI    HPI      Teo Gloria is a 78 y.o. male here following on ER visit for urinary tract infection.  Mr. Bruce is a 78-year-old gentleman who had a few days of just feeling down and under the weather.  Then he went to the emergency room it [facility] and was found have a urinary tract infection which was treated with antibiotics.  He now feels tremendously better.  He had increased frequency as well during this time.  As well as nocturia.  He still has a few more episodes of needing to use the bathroom at night however improving            Review of Symptoms    Constitutional  Neg activity change, No chills /fever   Resp  Neg hemoptysis, stridor, choking  CVS  Neg chest pain, palpitations    Physical Exam    Constitutional:  Oriented to person, place, and time.appears well-developed and well-nourished.  No distress.      HENT  Head: Normocephalic and atraumatic  Right Ear: External ear normal.   Left Ear: External ear normal.   Nose: External nose normal.   Mouth:  Moist mucus membranes.    Eyes:  Conjunctivae are normal. Right eye exhibits no discharge.  Left eye exhibits no discharge. No scleral icterus.  No periorbital edema    Cardiovascular:  Irregular rhythm with normal S1 and S2     Pulmonary/Chest:   Clear to auscultation bilaterally without wheezes, rhonchi or rales      Musculoskeletal:  No edema. No obvious deformity No wasting       Neurological:  Alert and oriented to person, place, and time.   Coordination normal.     Skin:   Skin is warm and dry.  No diaphoresis.   No rash noted.     Psychiatric: Normal mood and affect. Behavior is normal.  Judgment and thought content normal.     Complete Blood Count  Lab Results   Component Value Date    RBC 4.35 (L) 09/18/2017    HGB 12.8 (L) 09/18/2017    HCT 36.1 (L) 10/03/2017    MCV 90 09/18/2017    MCH 29.4 09/18/2017    MCHC 32.6 09/18/2017    RDW 13.1  09/18/2017     09/18/2017    MPV 10.5 09/18/2017    GRAN 5.4 09/18/2017    GRAN 61.7 09/18/2017    LYMPH 2.2 09/18/2017    LYMPH 25.1 09/18/2017    MONO 0.8 09/18/2017    MONO 9.1 09/18/2017    EOS 0.3 09/18/2017    BASO 0.04 09/18/2017    EOSINOPHIL 2.9 09/18/2017    BASOPHIL 0.5 09/18/2017    DIFFMETHOD Automated 09/18/2017       Comprehensive Metabolic Panel  No results found for: GLU, BUN, CREATININE, NA, K, CL, PROT, ALBUMIN, BILITOT, AST, ALKPHOS, CO2, ALT, ANIONGAP, EGFRNONAA, ESTGFRAFRICA    TSH  No results found for: TSH    Assessment / Plan:      ICD-10-CM ICD-9-CM   1. Essential hypertension I10 401.9   2. Right bundle branch block I45.10 426.4   3. Irregular heartbeat I49.9 427.9     Essential hypertension  -     Cancel: EKG 12-lead  -     IN OFFICE EKG 12-LEAD (to Muse)    Right bundle branch block  -     Cancel: EKG 12-lead  -     IN OFFICE EKG 12-LEAD (to Muse)    Irregular heartbeat  -     Cancel: EKG 12-lead  -     IN OFFICE EKG 12-LEAD (to Muse)     EKG with PVCs-no a atrial fibrillation

## 2018-12-20 ENCOUNTER — OFFICE VISIT (OUTPATIENT)
Dept: FAMILY MEDICINE | Facility: CLINIC | Age: 78
End: 2018-12-20
Payer: MEDICARE

## 2018-12-20 ENCOUNTER — TELEPHONE (OUTPATIENT)
Dept: FAMILY MEDICINE | Facility: CLINIC | Age: 78
End: 2018-12-20

## 2018-12-20 VITALS
HEART RATE: 62 BPM | OXYGEN SATURATION: 95 % | WEIGHT: 207.44 LBS | BODY MASS INDEX: 31.44 KG/M2 | DIASTOLIC BLOOD PRESSURE: 76 MMHG | SYSTOLIC BLOOD PRESSURE: 124 MMHG | TEMPERATURE: 98 F | HEIGHT: 68 IN

## 2018-12-20 DIAGNOSIS — R30.0 DYSURIA: Primary | ICD-10-CM

## 2018-12-20 LAB
BILIRUB SERPL-MCNC: ABNORMAL MG/DL
BLOOD URINE, POC: ABNORMAL
COLOR, POC UA: YELLOW
GLUCOSE UR QL STRIP: ABNORMAL
KETONES UR QL STRIP: ABNORMAL
LEUKOCYTE ESTERASE URINE, POC: ABNORMAL
NITRITE, POC UA: ABNORMAL
PH, POC UA: 5
PROTEIN, POC: ABNORMAL
SPECIFIC GRAVITY, POC UA: 1.01
UROBILINOGEN, POC UA: ABNORMAL

## 2018-12-20 PROCEDURE — 81002 URINALYSIS NONAUTO W/O SCOPE: CPT | Mod: S$GLB,,, | Performed by: FAMILY MEDICINE

## 2018-12-20 PROCEDURE — 99213 OFFICE O/P EST LOW 20 MIN: CPT | Mod: 25,S$GLB,, | Performed by: FAMILY MEDICINE

## 2018-12-20 RX ORDER — PHENAZOPYRIDINE HYDROCHLORIDE 100 MG/1
TABLET, FILM COATED ORAL
Qty: 15 TABLET | Refills: 0 | COMMUNITY
Start: 2018-12-20 | End: 2019-02-07

## 2018-12-20 RX ORDER — CIPROFLOXACIN 250 MG/1
250 TABLET, FILM COATED ORAL 2 TIMES DAILY
Qty: 10 TABLET | Refills: 0 | Status: SHIPPED | OUTPATIENT
Start: 2018-12-20 | End: 2019-02-07

## 2018-12-20 NOTE — TELEPHONE ENCOUNTER
----- Message from Brandi Brar sent at 12/20/2018  9:42 AM CST -----  Contact: [phone]/self  Patient is requesting a call back. He is having problems urinating again. He is also in pain. Thanks

## 2018-12-29 NOTE — PROGRESS NOTES
Patient ID: Teo Gloria is a 78 y.o. male.    Chief Complaint: Abdominal Pain and Back Pain    HPI      Teo Gloria is a 78 y.o. male complain of suprapubic fullness.  No fever or chills.  Mild lower back pain. No delores dysuria but change in urinary frequency.  No significant change in color. or odor  Does not feel like he is finished urinating.  Little to no decrease in urinary stream.  May be getting up one more time a night to urinate.    Review of Symptoms    Constitutional  Neg activity change, No chills /fever   Resp  Neg hemoptysis, stridor, choking  CVS  Neg chest pain, palpitations    Physical Exam    Constitutional:  Oriented to person, place, and time.appears well-developed and well-nourished.  No distress.      HENT  Head: Normocephalic and atraumatic  Right Ear: External ear normal.   Left Ear: External ear normal.   Nose: External nose normal.   Mouth:  Moist mucus membranes.    Eyes:  Conjunctivae are normal. Right eye exhibits no discharge.  Left eye exhibits no discharge. No scleral icterus.  No periorbital edema    Cardiovascular:  Regular rate and rhythm with normal S1 and S2     Pulmonary/Chest:   Clear to auscultation bilaterally without wheezes, rhonchi or rales      Musculoskeletal:  No edema. No obvious deformity No wasting       Neurological:  Alert and oriented to person, place, and time.   Coordination normal.     Skin:   Skin is warm and dry.  No diaphoresis.   No rash noted.     Psychiatric: Normal mood and affect. Behavior is normal.  Judgment and thought content normal.     Abdomen:  Soft nontender nondistended mild suprapubic pain without palpation of enlarged bladder.    Complete Blood Count  Lab Results   Component Value Date    RBC 4.35 (L) 09/18/2017    HGB 12.8 (L) 09/18/2017    HCT 36.1 (L) 10/03/2017    MCV 90 09/18/2017    MCH 29.4 09/18/2017    MCHC 32.6 09/18/2017    RDW 13.1 09/18/2017     09/18/2017    MPV 10.5 09/18/2017    GRAN 5.4 09/18/2017    GRAN 61.7  09/18/2017    LYMPH 2.2 09/18/2017    LYMPH 25.1 09/18/2017    MONO 0.8 09/18/2017    MONO 9.1 09/18/2017    EOS 0.3 09/18/2017    BASO 0.04 09/18/2017    EOSINOPHIL 2.9 09/18/2017    BASOPHIL 0.5 09/18/2017    DIFFMETHOD Automated 09/18/2017       Comprehensive Metabolic Panel  No results found for: GLU, BUN, CREATININE, NA, K, CL, PROT, ALBUMIN, BILITOT, AST, ALKPHOS, CO2, ALT, ANIONGAP, EGFRNONAA, ESTGFRAFRICA    TSH  No results found for: TSH    Assessment / Plan:      ICD-10-CM ICD-9-CM   1. Dysuria R30.0 788.1     Dysuria  -     POCT URINE DIPSTICK WITHOUT MICROSCOPE    Other orders  -     ciprofloxacin HCl (CIPRO) 250 MG tablet; Take 1 tablet (250 mg total) by mouth 2 (two) times daily.  Dispense: 10 tablet; Refill: 0  -     phenazopyridine (PYRIDIUM) 100 MG tablet; One three times a day as needed for urinary / bladder pain  Dispense: 15 tablet; Refill: 0     I was concerned about urinary retention-in and out catheterization performed no postvoid residual volume.

## 2018-12-31 ENCOUNTER — EXTERNAL CHRONIC CARE MANAGEMENT (OUTPATIENT)
Dept: PRIMARY CARE CLINIC | Facility: CLINIC | Age: 78
End: 2018-12-31
Payer: MEDICARE

## 2018-12-31 PROCEDURE — 99490 CHRNC CARE MGMT STAFF 1ST 20: CPT | Mod: S$PBB,,, | Performed by: FAMILY MEDICINE

## 2018-12-31 PROCEDURE — 99490 CHRNC CARE MGMT STAFF 1ST 20: CPT | Mod: PBBFAC | Performed by: FAMILY MEDICINE

## 2018-12-31 PROCEDURE — 99490 PR CHRONIC CARE MGMT, 1ST 20 MIN: ICD-10-PCS | Mod: S$PBB,,, | Performed by: FAMILY MEDICINE

## 2019-02-07 ENCOUNTER — HOSPITAL ENCOUNTER (OUTPATIENT)
Dept: RADIOLOGY | Facility: HOSPITAL | Age: 79
Discharge: HOME | End: 2019-02-07
Attending: FAMILY MEDICINE
Payer: MEDICARE

## 2019-02-07 ENCOUNTER — OFFICE VISIT (OUTPATIENT)
Dept: FAMILY MEDICINE | Facility: CLINIC | Age: 79
End: 2019-02-07
Payer: MEDICARE

## 2019-02-07 VITALS
HEIGHT: 68 IN | SYSTOLIC BLOOD PRESSURE: 122 MMHG | OXYGEN SATURATION: 97 % | BODY MASS INDEX: 30.99 KG/M2 | HEART RATE: 78 BPM | WEIGHT: 204.5 LBS | TEMPERATURE: 99 F | DIASTOLIC BLOOD PRESSURE: 62 MMHG

## 2019-02-07 DIAGNOSIS — N39.0 URINARY TRACT INFECTION WITHOUT HEMATURIA, SITE UNSPECIFIED: ICD-10-CM

## 2019-02-07 DIAGNOSIS — R30.0 DYSURIA: Primary | ICD-10-CM

## 2019-02-07 DIAGNOSIS — R10.9 ABDOMINAL PAIN, UNSPECIFIED ABDOMINAL LOCATION: ICD-10-CM

## 2019-02-07 LAB
BILIRUB SERPL-MCNC: ABNORMAL MG/DL
BLOOD URINE, POC: ABNORMAL
COLOR, POC UA: ABNORMAL
GLUCOSE UR QL STRIP: ABNORMAL
KETONES UR QL STRIP: ABNORMAL
LEUKOCYTE ESTERASE URINE, POC: ABNORMAL
NITRITE, POC UA: ABNORMAL
PH, POC UA: 5
PROTEIN, POC: ABNORMAL
SPECIFIC GRAVITY, POC UA: 1.02
UROBILINOGEN, POC UA: ABNORMAL

## 2019-02-07 PROCEDURE — 99213 OFFICE O/P EST LOW 20 MIN: CPT | Mod: 25,S$GLB,, | Performed by: FAMILY MEDICINE

## 2019-02-07 PROCEDURE — 96372 PR INJECTION,THERAP/PROPH/DIAG2ST, IM OR SUBCUT: ICD-10-PCS | Mod: S$GLB,,, | Performed by: FAMILY MEDICINE

## 2019-02-07 PROCEDURE — 99213 PR OFFICE/OUTPT VISIT, EST, LEVL III, 20-29 MIN: ICD-10-PCS | Mod: 25,S$GLB,, | Performed by: FAMILY MEDICINE

## 2019-02-07 PROCEDURE — 81002 POCT URINE DIPSTICK WITHOUT MICROSCOPE: ICD-10-PCS | Mod: S$GLB,,, | Performed by: FAMILY MEDICINE

## 2019-02-07 PROCEDURE — 76770 US EXAM ABDO BACK WALL COMP: CPT | Mod: TC,PO

## 2019-02-07 PROCEDURE — 96372 THER/PROPH/DIAG INJ SC/IM: CPT | Mod: S$GLB,,, | Performed by: FAMILY MEDICINE

## 2019-02-07 PROCEDURE — 81002 URINALYSIS NONAUTO W/O SCOPE: CPT | Mod: S$GLB,,, | Performed by: FAMILY MEDICINE

## 2019-02-07 RX ORDER — KETOROLAC TROMETHAMINE 30 MG/ML
60 INJECTION, SOLUTION INTRAMUSCULAR; INTRAVENOUS ONCE
Status: COMPLETED | OUTPATIENT
Start: 2019-02-07 | End: 2019-02-07

## 2019-02-07 RX ORDER — CIPROFLOXACIN 250 MG/1
250 TABLET, FILM COATED ORAL 2 TIMES DAILY
Qty: 10 TABLET | Refills: 0 | Status: SHIPPED | OUTPATIENT
Start: 2019-02-07 | End: 2019-02-14 | Stop reason: SDUPTHER

## 2019-02-07 RX ORDER — PHENAZOPYRIDINE HYDROCHLORIDE 100 MG/1
200 TABLET, FILM COATED ORAL 3 TIMES DAILY PRN
Qty: 15 TABLET | Refills: 3 | Status: SHIPPED | OUTPATIENT
Start: 2019-02-07 | End: 2019-02-14 | Stop reason: SDUPTHER

## 2019-02-07 RX ADMIN — KETOROLAC TROMETHAMINE 60 MG: 30 INJECTION, SOLUTION INTRAMUSCULAR; INTRAVENOUS at 11:02

## 2019-02-11 NOTE — PROGRESS NOTES
Patient ID: Teo Gloria is a 78 y.o. male.    Chief Complaint: Lower abdominal pain    HPI      Teo Gloria is a 78 y.o. male here with moderate to severe suprapubic pain. Increased urinary frequency, dysuria and urgency.  Patient had these symptoms in the recent past treated for UTI.  Symptoms manic obstruction-in out catheterization was done very little urine.      Review of Symptoms    Constitutional  Neg activity change, No chills /fever   Resp  Neg hemoptysis, stridor, choking  CVS  Neg chest pain, palpitations    Physical Exam    Constitutional:  Oriented to person, place, and time.appears well-developed and well-nourished.  No distress.      HENT  Head: Normocephalic and atraumatic  Right Ear: External ear normal.   Left Ear: External ear normal.   Nose: External nose normal.   Mouth:  Moist mucus membranes.    Eyes:  Conjunctivae are normal. Right eye exhibits no discharge.  Left eye exhibits no discharge. No scleral icterus.  No periorbital edema    Cardiovascular:  Regular rate and rhythm with normal S1 and S2     Pulmonary/Chest:   Clear to auscultation bilaterally without wheezes, rhonchi or rales      Musculoskeletal:  No edema. No obvious deformity No wasting    Abdomen:  Soft tender over suprapubic area, non distended, No hepatic or splenic enlargement.  No rebound or guarding.     Neurological:  Alert and oriented to person, place, and time.   Coordination normal.     Skin:   Skin is warm and dry.  No diaphoresis.   No rash noted.     Psychiatric: Normal mood and affect. Behavior is normal.  Judgment and thought content normal.     Complete Blood Count  Lab Results   Component Value Date    RBC 4.35 (L) 09/18/2017    HGB 12.8 (L) 09/18/2017    HCT 36.1 (L) 10/03/2017    MCV 90 09/18/2017    MCH 29.4 09/18/2017    MCHC 32.6 09/18/2017    RDW 13.1 09/18/2017     09/18/2017    MPV 10.5 09/18/2017    GRAN 5.4 09/18/2017    GRAN 61.7 09/18/2017    LYMPH 2.2 09/18/2017    LYMPH 25.1  09/18/2017    MONO 0.8 09/18/2017    MONO 9.1 09/18/2017    EOS 0.3 09/18/2017    BASO 0.04 09/18/2017    EOSINOPHIL 2.9 09/18/2017    BASOPHIL 0.5 09/18/2017    DIFFMETHOD Automated 09/18/2017       Comprehensive Metabolic Panel  No results found for: GLU, BUN, CREATININE, NA, K, CL, PROT, ALBUMIN, BILITOT, AST, ALKPHOS, CO2, ALT, ANIONGAP, EGFRNONAA, ESTGFRAFRICA    TSH  No results found for: TSH    Assessment / Plan:      ICD-10-CM ICD-9-CM   1. Dysuria R30.0 788.1   2. Urinary tract infection without hematuria, site unspecified N39.0 599.0   3. Abdominal pain, unspecified abdominal location R10.9 789.00     Dysuria  -     POCT URINE DIPSTICK WITHOUT MICROSCOPE    Urinary tract infection without hematuria, site unspecified  -     Ambulatory referral to Urology  -     US Retroperitoneal Complete (Kidney and; Future; Expected date: 02/07/2019    Abdominal pain, unspecified abdominal location  -     US Retroperitoneal Complete (Kidney and; Future; Expected date: 02/07/2019  -     ketorolac injection 60 mg    Other orders  -     ciprofloxacin HCl (CIPRO) 250 MG tablet; Take 1 tablet (250 mg total) by mouth 2 (two) times daily.  Dispense: 10 tablet; Refill: 0  -     phenazopyridine (PYRIDIUM) 100 MG tablet; Take 2 tablets (200 mg total) by mouth 3 (three) times daily as needed for Pain.  Dispense: 15 tablet; Refill: 3     Concern for obstruction

## 2019-02-14 ENCOUNTER — OFFICE VISIT (OUTPATIENT)
Dept: FAMILY MEDICINE | Facility: CLINIC | Age: 79
End: 2019-02-14
Payer: MEDICARE

## 2019-02-14 ENCOUNTER — TELEPHONE (OUTPATIENT)
Dept: FAMILY MEDICINE | Facility: CLINIC | Age: 79
End: 2019-02-14

## 2019-02-14 VITALS
TEMPERATURE: 99 F | HEART RATE: 64 BPM | BODY MASS INDEX: 31.66 KG/M2 | WEIGHT: 208.88 LBS | OXYGEN SATURATION: 95 % | HEIGHT: 68 IN | SYSTOLIC BLOOD PRESSURE: 130 MMHG | DIASTOLIC BLOOD PRESSURE: 70 MMHG

## 2019-02-14 DIAGNOSIS — R30.0 DYSURIA: Primary | ICD-10-CM

## 2019-02-14 DIAGNOSIS — N39.0 URINARY TRACT INFECTION WITHOUT HEMATURIA, SITE UNSPECIFIED: ICD-10-CM

## 2019-02-14 PROCEDURE — 96372 PR INJECTION,THERAP/PROPH/DIAG2ST, IM OR SUBCUT: ICD-10-PCS | Mod: S$GLB,,, | Performed by: FAMILY MEDICINE

## 2019-02-14 PROCEDURE — 87088 URINE BACTERIA CULTURE: CPT

## 2019-02-14 PROCEDURE — 87186 SC STD MICRODIL/AGAR DIL: CPT

## 2019-02-14 PROCEDURE — 99213 OFFICE O/P EST LOW 20 MIN: CPT | Mod: 25,S$GLB,, | Performed by: FAMILY MEDICINE

## 2019-02-14 PROCEDURE — 87077 CULTURE AEROBIC IDENTIFY: CPT

## 2019-02-14 PROCEDURE — 99213 PR OFFICE/OUTPT VISIT, EST, LEVL III, 20-29 MIN: ICD-10-PCS | Mod: 25,S$GLB,, | Performed by: FAMILY MEDICINE

## 2019-02-14 PROCEDURE — 96372 THER/PROPH/DIAG INJ SC/IM: CPT | Mod: S$GLB,,, | Performed by: FAMILY MEDICINE

## 2019-02-14 PROCEDURE — 87086 URINE CULTURE/COLONY COUNT: CPT

## 2019-02-14 RX ORDER — CIPROFLOXACIN 250 MG/1
500 TABLET, FILM COATED ORAL 2 TIMES DAILY
Qty: 14 TABLET | Refills: 1 | Status: SHIPPED | OUTPATIENT
Start: 2019-02-14 | End: 2019-04-08

## 2019-02-14 RX ORDER — PHENAZOPYRIDINE HYDROCHLORIDE 100 MG/1
200 TABLET, FILM COATED ORAL 3 TIMES DAILY PRN
Qty: 15 TABLET | Refills: 3 | Status: SHIPPED | OUTPATIENT
Start: 2019-02-14 | End: 2019-02-24

## 2019-02-14 RX ORDER — KETOROLAC TROMETHAMINE 30 MG/ML
60 INJECTION, SOLUTION INTRAMUSCULAR; INTRAVENOUS ONCE
Status: COMPLETED | OUTPATIENT
Start: 2019-02-14 | End: 2019-02-14

## 2019-02-14 RX ADMIN — KETOROLAC TROMETHAMINE 60 MG: 30 INJECTION, SOLUTION INTRAMUSCULAR; INTRAVENOUS at 12:02

## 2019-02-14 NOTE — TELEPHONE ENCOUNTER
----- Message from Brandi Brar sent at 2/14/2019 10:19 AM CST -----  Contact: [phone]/self  Patient is requesting a call back. He is in pain again. He would like to be seen today. Thanks

## 2019-02-14 NOTE — TELEPHONE ENCOUNTER
----- Message from Brandi Brar sent at 2/14/2019 10:19 AM CST -----  Contact: [phone]/self  Patient is requesting a call back. He is in pain again. He would like to be seen today. Thanks

## 2019-02-17 LAB — BACTERIA UR CULT: NORMAL

## 2019-02-18 NOTE — PROGRESS NOTES
Patient ID: Teo Gloria is a 78 y.o. male.    Chief Complaint: Back Pain    HPI      Teo Gloria is a 78 y.o. male presents today with lower back pain and suprapubic pain.  Similar to pain he had before.  Treated with antibiotics this pain resolved.  Also azo helped.  Last time he was here injection of Toradol was beneficial within 30 min.  He has not seen urologist yet.  Call for appointment and was never call back.          Review of Symptoms    Constitutional  Neg activity change, No chills /fever   Resp  Neg hemoptysis, stridor, choking  CVS  Neg chest pain, palpitations    Physical Exam    Constitutional:  Oriented to person, place, and time.appears well-developed and well-nourished.  No distress.      HENT  Head: Normocephalic and atraumatic  Right Ear: External ear normal.   Left Ear: External ear normal.   Nose: External nose normal.   Mouth:  Moist mucus membranes.    Eyes:  Conjunctivae are normal. Right eye exhibits no discharge.  Left eye exhibits no discharge. No scleral icterus.  No periorbital edema    No CVA tenderness or suprapubic tenderness.      Musculoskeletal:  No edema. No obvious deformity No wasting       Neurological:  Alert and oriented to person, place, and time.   Coordination normal.     Skin:   Skin is warm and dry.  No diaphoresis.   No rash noted.     Psychiatric: Normal mood and affect. Behavior is normal.  Judgment and thought content normal.     Complete Blood Count  Lab Results   Component Value Date    RBC 4.35 (L) 09/18/2017    HGB 12.8 (L) 09/18/2017    HCT 36.1 (L) 10/03/2017    MCV 90 09/18/2017    MCH 29.4 09/18/2017    MCHC 32.6 09/18/2017    RDW 13.1 09/18/2017     09/18/2017    MPV 10.5 09/18/2017    GRAN 5.4 09/18/2017    GRAN 61.7 09/18/2017    LYMPH 2.2 09/18/2017    LYMPH 25.1 09/18/2017    MONO 0.8 09/18/2017    MONO 9.1 09/18/2017    EOS 0.3 09/18/2017    BASO 0.04 09/18/2017    EOSINOPHIL 2.9 09/18/2017    BASOPHIL 0.5 09/18/2017    DIFFMETHOD  Automated 09/18/2017       Comprehensive Metabolic Panel  No results found for: GLU, BUN, CREATININE, NA, K, CL, PROT, ALBUMIN, BILITOT, AST, ALKPHOS, CO2, ALT, ANIONGAP, EGFRNONAA, ESTGFRAFRICA    TSH  No results found for: TSH    Assessment / Plan:      ICD-10-CM ICD-9-CM   1. Dysuria R30.0 788.1   2. Urinary tract infection without hematuria, site unspecified N39.0 599.0     Dysuria  -     ketorolac injection 60 mg  -     Urine culture; Future; Expected date: 02/14/2020    Urinary tract infection without hematuria, site unspecified  -     ketorolac injection 60 mg  -     Urine culture; Future; Expected date: 02/14/2020    Other orders  -     phenazopyridine (PYRIDIUM) 100 MG tablet; Take 2 tablets (200 mg total) by mouth 3 (three) times daily as needed for Pain.  Dispense: 15 tablet; Refill: 3  -     ciprofloxacin HCl (CIPRO) 250 MG tablet; Take 2 tablets (500 mg total) by mouth 2 (two) times daily.  Dispense: 14 tablet; Refill: 1

## 2019-03-29 ENCOUNTER — TELEPHONE (OUTPATIENT)
Dept: FAMILY MEDICINE | Facility: CLINIC | Age: 79
End: 2019-03-29

## 2019-03-29 NOTE — TELEPHONE ENCOUNTER
----- Message from Davey Sinha sent at 3/29/2019  1:17 PM CDT -----  Contact: [phone]  Patient requested to speak with the nurse about the blood work and biopsy he did this morning. Please call.      Patient wanted Dr Hector to know that he had labs drawn yesterday at [facility] for Dr Teague and his  PSA was 79.  Dr Teague did a biopsy today

## 2019-03-31 ENCOUNTER — EXTERNAL CHRONIC CARE MANAGEMENT (OUTPATIENT)
Dept: PRIMARY CARE CLINIC | Facility: CLINIC | Age: 79
End: 2019-03-31
Payer: MEDICARE

## 2019-03-31 PROCEDURE — 99490 CHRNC CARE MGMT STAFF 1ST 20: CPT | Mod: PBBFAC | Performed by: FAMILY MEDICINE

## 2019-03-31 PROCEDURE — 99490 CHRNC CARE MGMT STAFF 1ST 20: CPT | Mod: S$PBB,,, | Performed by: FAMILY MEDICINE

## 2019-03-31 PROCEDURE — 99490 PR CHRONIC CARE MGMT, 1ST 20 MIN: ICD-10-PCS | Mod: S$PBB,,, | Performed by: FAMILY MEDICINE

## 2019-04-08 ENCOUNTER — OFFICE VISIT (OUTPATIENT)
Dept: FAMILY MEDICINE | Facility: CLINIC | Age: 79
End: 2019-04-08
Payer: MEDICARE

## 2019-04-08 ENCOUNTER — TELEPHONE (OUTPATIENT)
Dept: FAMILY MEDICINE | Facility: CLINIC | Age: 79
End: 2019-04-08

## 2019-04-08 VITALS
WEIGHT: 201.75 LBS | DIASTOLIC BLOOD PRESSURE: 62 MMHG | OXYGEN SATURATION: 96 % | SYSTOLIC BLOOD PRESSURE: 114 MMHG | HEART RATE: 66 BPM | BODY MASS INDEX: 30.58 KG/M2 | HEIGHT: 68 IN | TEMPERATURE: 99 F

## 2019-04-08 DIAGNOSIS — L72.3 SEBACEOUS CYST: Primary | ICD-10-CM

## 2019-04-08 PROCEDURE — 99211 PR OFFICE/OUTPT VISIT, EST, LEVL I: ICD-10-PCS | Mod: S$GLB,,, | Performed by: FAMILY MEDICINE

## 2019-04-08 PROCEDURE — 99211 OFF/OP EST MAY X REQ PHY/QHP: CPT | Mod: S$GLB,,, | Performed by: FAMILY MEDICINE

## 2019-04-08 RX ORDER — TRAMADOL HYDROCHLORIDE 50 MG/1
TABLET ORAL
COMMUNITY
Start: 2019-03-30 | End: 2019-07-31

## 2019-04-08 RX ORDER — HYDROCODONE BITARTRATE AND ACETAMINOPHEN 10; 325 MG/1; MG/1
TABLET ORAL
COMMUNITY
End: 2019-07-31

## 2019-04-08 RX ORDER — CEFUROXIME AXETIL 500 MG/1
TABLET ORAL
COMMUNITY
End: 2019-07-31

## 2019-04-08 RX ORDER — TAMSULOSIN HYDROCHLORIDE 0.4 MG/1
CAPSULE ORAL
COMMUNITY
End: 2019-07-31

## 2019-04-08 NOTE — TELEPHONE ENCOUNTER
----- Message from Talha Morgan sent at 4/8/2019  9:48 AM CDT -----  Contact: self  Type:  Same Day Appointment Request    Caller is requesting a same day appointment.  Caller declined first available appointment listed below.    Name of Caller:Pt  When is the first available appointment? 4/12/19  Symptoms: something on back  Best Call Back Number: [phone]  Additional Information: something on back

## 2019-04-20 NOTE — PROGRESS NOTES
" Patient ID: Teo Gloria is a 79 y.o. male.    Chief Complaint: spot on back; discuss recent medical diagnosis    HPI       Teo Gloria is a 79 y.o. male complains an area on his that is swollen mildly tender.  No fever chills.  Cannot see will easily because it is on his back.      Review of Symptoms    Constitutional  No change in activity, No chills fever   Resp  Neg hemoptysis, stridor, choking  CVS  Neg chest pain, palpitations    /62   Pulse 66   Temp 98.6 °F (37 °C)   Ht 5' 8" (1.727 m)   Wt 91.5 kg (201 lb 11.5 oz)   SpO2 96%   BMI 30.67 kg/m²     Physical Exam    Constitutional:   Oriented to person, place, and time.appears well-developed and well-nourished.   No distress.  he    HENT  Head: Normocephalic and atraumatic  Right Ear: External ear normal.   Left Ear: External ear normal.   Nose: External nose normal.   Mouth: Moist mucous membranes    Eyes:   Conjunctivae are normal. Right eye exhibits no discharge. Left eye exhibits no discharge. No scleral icterus. No periorbital edema    Musculoskeletal:  No edema. No obvious deformity No wasting     Neurological:  Alert and oriented to person, place, and time. Coordination normal.     Skin:   Skin is warm and dry.  No diaphoresis.   No rash noted.     Psychiatric: Normal mood and affect. Behavior is normal. Judgment and thought content normal.     Complete Blood Count  Lab Results   Component Value Date    RBC 4.35 (L) 09/18/2017    HGB 12.8 (L) 09/18/2017    HCT 36.1 (L) 10/03/2017    MCV 90 09/18/2017    MCH 29.4 09/18/2017    MCHC 32.6 09/18/2017    RDW 13.1 09/18/2017     09/18/2017    MPV 10.5 09/18/2017    GRAN 5.4 09/18/2017    GRAN 61.7 09/18/2017    LYMPH 2.2 09/18/2017    LYMPH 25.1 09/18/2017    MONO 0.8 09/18/2017    MONO 9.1 09/18/2017    EOS 0.3 09/18/2017    BASO 0.04 09/18/2017    EOSINOPHIL 2.9 09/18/2017    BASOPHIL 0.5 09/18/2017    DIFFMETHOD Automated 09/18/2017       Comprehensive Metabolic Panel  No results " found for: GLU, BUN, CREATININE, NA, K, CL, PROT, ALBUMIN, BILITOT, AST, ALKPHOS, CO2, ALT, ANIONGAP, EGFRNONAA, ESTGFRAFRICA    TSH  No results found for: TSH    Assessment / Plan:      ICD-10-CM ICD-9-CM   1. Sebaceous cyst L72.3 706.2     Sebaceous cyst  Comments:  I and D of cyst  Orders:  -     Incision & Drainage

## 2019-04-20 NOTE — PROCEDURES
Incision & Drainage  Date/Time: 4/19/2019 10:44 PM  Performed by: Fredis Hector MD  Authorized by: Fredis Hector MD     Consent Done?:  Yes (Verbal)    Type:  Cyst  Body area:  Trunk  Location details:  Back  Anesthesia:  Local infiltration  Scalpel size:  15  Incision type:  Single straight  Complexity:  Simple  Drainage:  Serosanguinous  Drainage amount:  Moderate  Wound treatment:  Wound packed  Packing material:  1/4 in gauze  Patient tolerance:  Patient tolerated the procedure well with no immediate complications    Sebaceous cyst removed almost all the cyst wall

## 2019-04-30 ENCOUNTER — EXTERNAL CHRONIC CARE MANAGEMENT (OUTPATIENT)
Dept: PRIMARY CARE CLINIC | Facility: CLINIC | Age: 79
End: 2019-04-30
Payer: MEDICARE

## 2019-04-30 PROCEDURE — 99490 CHRNC CARE MGMT STAFF 1ST 20: CPT | Mod: S$PBB,,, | Performed by: FAMILY MEDICINE

## 2019-04-30 PROCEDURE — 99490 PR CHRONIC CARE MGMT, 1ST 20 MIN: ICD-10-PCS | Mod: S$PBB,,, | Performed by: FAMILY MEDICINE

## 2019-04-30 PROCEDURE — 99490 CHRNC CARE MGMT STAFF 1ST 20: CPT | Mod: PBBFAC | Performed by: FAMILY MEDICINE

## 2019-05-31 ENCOUNTER — EXTERNAL CHRONIC CARE MANAGEMENT (OUTPATIENT)
Dept: PRIMARY CARE CLINIC | Facility: CLINIC | Age: 79
End: 2019-05-31
Payer: MEDICARE

## 2019-05-31 PROCEDURE — 99490 PR CHRONIC CARE MGMT, 1ST 20 MIN: ICD-10-PCS | Mod: S$PBB,,, | Performed by: FAMILY MEDICINE

## 2019-05-31 PROCEDURE — 99490 CHRNC CARE MGMT STAFF 1ST 20: CPT | Mod: S$PBB,,, | Performed by: FAMILY MEDICINE

## 2019-05-31 PROCEDURE — 99490 CHRNC CARE MGMT STAFF 1ST 20: CPT | Mod: PBBFAC | Performed by: FAMILY MEDICINE

## 2019-06-30 ENCOUNTER — EXTERNAL CHRONIC CARE MANAGEMENT (OUTPATIENT)
Dept: PRIMARY CARE CLINIC | Facility: CLINIC | Age: 79
End: 2019-06-30
Payer: MEDICARE

## 2019-06-30 PROCEDURE — 99490 PR CHRONIC CARE MGMT, 1ST 20 MIN: ICD-10-PCS | Mod: S$PBB,,, | Performed by: FAMILY MEDICINE

## 2019-06-30 PROCEDURE — 99490 CHRNC CARE MGMT STAFF 1ST 20: CPT | Mod: PBBFAC | Performed by: FAMILY MEDICINE

## 2019-06-30 PROCEDURE — 99490 CHRNC CARE MGMT STAFF 1ST 20: CPT | Mod: S$PBB,,, | Performed by: FAMILY MEDICINE

## 2019-07-22 ENCOUNTER — OUTSIDE PLACE OF SERVICE (OUTPATIENT)
Dept: CARDIOLOGY | Facility: CLINIC | Age: 79
End: 2019-07-22
Payer: MEDICARE

## 2019-07-22 PROCEDURE — 93010 ELECTROCARDIOGRAM REPORT: CPT | Mod: ,,, | Performed by: INTERNAL MEDICINE

## 2019-07-22 PROCEDURE — 93010 PR ELECTROCARDIOGRAM REPORT: ICD-10-PCS | Mod: ,,, | Performed by: INTERNAL MEDICINE

## 2019-07-31 ENCOUNTER — OFFICE VISIT (OUTPATIENT)
Dept: FAMILY MEDICINE | Facility: CLINIC | Age: 79
End: 2019-07-31
Payer: MEDICARE

## 2019-07-31 ENCOUNTER — EXTERNAL CHRONIC CARE MANAGEMENT (OUTPATIENT)
Dept: PRIMARY CARE CLINIC | Facility: CLINIC | Age: 79
End: 2019-07-31
Payer: MEDICARE

## 2019-07-31 ENCOUNTER — TELEPHONE (OUTPATIENT)
Dept: FAMILY MEDICINE | Facility: CLINIC | Age: 79
End: 2019-07-31

## 2019-07-31 VITALS
DIASTOLIC BLOOD PRESSURE: 62 MMHG | OXYGEN SATURATION: 97 % | SYSTOLIC BLOOD PRESSURE: 120 MMHG | WEIGHT: 208.31 LBS | HEART RATE: 79 BPM | HEIGHT: 68 IN | BODY MASS INDEX: 31.57 KG/M2 | TEMPERATURE: 98 F

## 2019-07-31 DIAGNOSIS — R42 DIZZINESS: Primary | ICD-10-CM

## 2019-07-31 DIAGNOSIS — I10 ESSENTIAL HYPERTENSION: ICD-10-CM

## 2019-07-31 PROCEDURE — 99213 OFFICE O/P EST LOW 20 MIN: CPT | Mod: S$GLB,,, | Performed by: FAMILY MEDICINE

## 2019-07-31 PROCEDURE — 99490 CHRNC CARE MGMT STAFF 1ST 20: CPT | Mod: PBBFAC | Performed by: FAMILY MEDICINE

## 2019-07-31 PROCEDURE — 99213 PR OFFICE/OUTPT VISIT, EST, LEVL III, 20-29 MIN: ICD-10-PCS | Mod: S$GLB,,, | Performed by: FAMILY MEDICINE

## 2019-07-31 PROCEDURE — 99490 CHRNC CARE MGMT STAFF 1ST 20: CPT | Mod: S$PBB,,, | Performed by: FAMILY MEDICINE

## 2019-07-31 PROCEDURE — 99490 PR CHRONIC CARE MGMT, 1ST 20 MIN: ICD-10-PCS | Mod: S$PBB,,, | Performed by: FAMILY MEDICINE

## 2019-07-31 RX ORDER — MECLIZINE HYDROCHLORIDE 25 MG/1
25 TABLET ORAL 3 TIMES DAILY PRN
Refills: 0 | COMMUNITY
Start: 2019-07-22 | End: 2019-07-31 | Stop reason: SDUPTHER

## 2019-07-31 RX ORDER — POTASSIUM CHLORIDE 20 MEQ/1
20 TABLET, EXTENDED RELEASE ORAL DAILY
Refills: 0 | COMMUNITY
Start: 2019-07-22 | End: 2020-08-04

## 2019-07-31 RX ORDER — ABIRATERONE ACETATE 250 MG/1
250 TABLET ORAL
COMMUNITY
Start: 2019-07-25

## 2019-07-31 RX ORDER — MECLIZINE HYDROCHLORIDE 25 MG/1
25 TABLET ORAL 3 TIMES DAILY PRN
Qty: 30 TABLET | Refills: 1 | Status: SHIPPED | OUTPATIENT
Start: 2019-07-31 | End: 2019-10-10 | Stop reason: SDUPTHER

## 2019-07-31 RX ORDER — PREDNISONE 5 MG/1
5 TABLET ORAL 2 TIMES DAILY
COMMUNITY
Start: 2019-07-10 | End: 2019-11-05

## 2019-07-31 RX ORDER — LISINOPRIL AND HYDROCHLOROTHIAZIDE 10; 12.5 MG/1; MG/1
1 TABLET ORAL DAILY
Qty: 90 TABLET | Refills: 3 | Status: SHIPPED | OUTPATIENT
Start: 2019-07-31 | End: 2020-08-04 | Stop reason: SDUPTHER

## 2019-07-31 NOTE — PROGRESS NOTES
"Subjective:       Patient ID: Teo Gloria is a 79 y.o. male.    Chief Complaint: Dizziness    78 y/o with hx of HTN and Prostate Ca, stable  on medication, here for follow up of ER visit 3 days ago due to feeling "wobbly", drunk without drinking,  States now feels better with meclizine, denies any vomiting, had some nausea few days ago, denies sore throat, no headache.no palpitation. States had EKG and labs, WNL     Review of Systems    Objective:      Physical Exam   Constitutional: He is oriented to person, place, and time. He appears well-developed and well-nourished. He is cooperative.  Non-toxic appearance. He does not appear ill.   HENT:   Head: Normocephalic and atraumatic.   Right Ear: Hearing, tympanic membrane, external ear and ear canal normal.   Left Ear: Hearing, tympanic membrane, external ear and ear canal normal.   Nose: Nose normal. No mucosal edema, rhinorrhea or nasal deformity. No epistaxis. Right sinus exhibits no maxillary sinus tenderness and no frontal sinus tenderness. Left sinus exhibits no maxillary sinus tenderness and no frontal sinus tenderness.   Mouth/Throat: Uvula is midline, oropharynx is clear and moist and mucous membranes are normal. No trismus in the jaw. Normal dentition. No uvula swelling. No oropharyngeal exudate or posterior oropharyngeal erythema.   Right tm with fluid  Left tm wax   Eyes: Conjunctivae and lids are normal. Right eye exhibits no discharge. Left eye exhibits no discharge. No scleral icterus.   Sclera clear bilat   Neck: Trachea normal, normal range of motion, full passive range of motion without pain and phonation normal. Neck supple. No JVD present. No tracheal deviation present.   Cardiovascular: Normal rate, regular rhythm, normal heart sounds, intact distal pulses and normal pulses. Exam reveals no friction rub.   No murmur heard.  Pulmonary/Chest: Effort normal and breath sounds normal. No stridor. No respiratory distress. He has no wheezes. He has no " rales.   Abdominal: Soft. Normal appearance and bowel sounds are normal. He exhibits no distension, no pulsatile midline mass and no mass. There is no tenderness.   Musculoskeletal: Normal range of motion. He exhibits no edema or deformity.   Lymphadenopathy:     He has no cervical adenopathy.   Neurological: He is alert and oriented to person, place, and time. He exhibits normal muscle tone. Coordination normal.   Skin: Skin is warm, dry and intact. He is not diaphoretic. No pallor.   Psychiatric: He has a normal mood and affect. His speech is normal and behavior is normal. Judgment and thought content normal. Cognition and memory are normal.   Nursing note and vitals reviewed.      Assessment:       1. Dizziness    2. Essential hypertension        Plan:         Teo was seen today for dizziness.    Diagnoses and all orders for this visit:    Dizziness    Essential hypertension    Other orders  -     lisinopril-hydrochlorothiazide (PRINZIDE,ZESTORETIC) 10-12.5 mg per tablet; Take 1 tablet by mouth once daily.  -     meclizine (ANTIVERT) 25 mg tablet; Take 1 tablet (25 mg total) by mouth 3 (three) times daily as needed.         Cont Meclizinie as needed    If dizzyness persists RTC

## 2019-07-31 NOTE — TELEPHONE ENCOUNTER
Apt made.     ----- Message from Brandi Brar sent at 7/31/2019 10:20 AM CDT -----  Contact: [phone]/self  Type:  Same Day Appointment Request    Caller is requesting a same day appointment.  Caller declined first available appointment listed below.    Name of Caller: self  When is the first available appointment?   Symptoms: ER follow up with vertigo. Has prostate cancer and would like to speak with doctor.  Best Call Back Number:   Additional Information:  Any doctor in office      
Yes

## 2019-07-31 NOTE — PATIENT INSTRUCTIONS

## 2019-08-31 ENCOUNTER — EXTERNAL CHRONIC CARE MANAGEMENT (OUTPATIENT)
Dept: PRIMARY CARE CLINIC | Facility: CLINIC | Age: 79
End: 2019-08-31
Payer: MEDICARE

## 2019-08-31 PROCEDURE — 99490 PR CHRONIC CARE MGMT, 1ST 20 MIN: ICD-10-PCS | Mod: S$PBB,,, | Performed by: FAMILY MEDICINE

## 2019-08-31 PROCEDURE — 99490 CHRNC CARE MGMT STAFF 1ST 20: CPT | Mod: S$PBB,,, | Performed by: FAMILY MEDICINE

## 2019-08-31 PROCEDURE — 99490 CHRNC CARE MGMT STAFF 1ST 20: CPT | Mod: PBBFAC | Performed by: FAMILY MEDICINE

## 2019-09-30 ENCOUNTER — EXTERNAL CHRONIC CARE MANAGEMENT (OUTPATIENT)
Dept: PRIMARY CARE CLINIC | Facility: CLINIC | Age: 79
End: 2019-09-30
Payer: MEDICARE

## 2019-09-30 PROCEDURE — 99490 CHRNC CARE MGMT STAFF 1ST 20: CPT | Mod: S$PBB,,, | Performed by: FAMILY MEDICINE

## 2019-09-30 PROCEDURE — 99490 CHRNC CARE MGMT STAFF 1ST 20: CPT | Mod: PBBFAC | Performed by: FAMILY MEDICINE

## 2019-09-30 PROCEDURE — 99490 PR CHRONIC CARE MGMT, 1ST 20 MIN: ICD-10-PCS | Mod: S$PBB,,, | Performed by: FAMILY MEDICINE

## 2019-10-03 ENCOUNTER — PES CALL (OUTPATIENT)
Dept: ADMINISTRATIVE | Facility: CLINIC | Age: 79
End: 2019-10-03

## 2019-10-10 RX ORDER — MECLIZINE HYDROCHLORIDE 25 MG/1
25 TABLET ORAL 3 TIMES DAILY PRN
Qty: 30 TABLET | Refills: 1 | Status: SHIPPED | OUTPATIENT
Start: 2019-10-10 | End: 2022-09-06

## 2019-10-10 NOTE — TELEPHONE ENCOUNTER
----- Message from Hyun Boudreaux sent at 10/10/2019 10:11 AM CDT -----  Contact: [phone]-self  Refill request for:  meclizine (ANTIVERT) 25 mg tablet     Be sent to:  [id number] - [place], [place] - [address]

## 2019-10-31 ENCOUNTER — EXTERNAL CHRONIC CARE MANAGEMENT (OUTPATIENT)
Dept: PRIMARY CARE CLINIC | Facility: CLINIC | Age: 79
End: 2019-10-31
Payer: MEDICARE

## 2019-10-31 PROCEDURE — 99490 PR CHRONIC CARE MGMT, 1ST 20 MIN: ICD-10-PCS | Mod: S$PBB,,, | Performed by: FAMILY MEDICINE

## 2019-10-31 PROCEDURE — 99490 CHRNC CARE MGMT STAFF 1ST 20: CPT | Mod: PBBFAC | Performed by: FAMILY MEDICINE

## 2019-10-31 PROCEDURE — 99490 CHRNC CARE MGMT STAFF 1ST 20: CPT | Mod: S$PBB,,, | Performed by: FAMILY MEDICINE

## 2019-11-04 ENCOUNTER — TELEPHONE (OUTPATIENT)
Dept: FAMILY MEDICINE | Facility: CLINIC | Age: 79
End: 2019-11-04

## 2019-11-04 RX ORDER — PREDNISONE 10 MG/1
10 TABLET ORAL DAILY
Qty: 5 TABLET | Refills: 0 | Status: SHIPPED | OUTPATIENT
Start: 2019-11-04 | End: 2019-11-09

## 2019-11-04 RX ORDER — AMOXICILLIN 500 MG/1
CAPSULE ORAL
Qty: 28 CAPSULE | Refills: 0 | Status: SHIPPED | OUTPATIENT
Start: 2019-11-04 | End: 2020-08-04

## 2019-11-04 NOTE — TELEPHONE ENCOUNTER
I spoke with the pt   He is in [place] with his wife - she is in the hospital with pneumonia and strep throat  He cant come in tomorrow  But asked that you send rx to medicine [facility]  He does not have fever  But insist it is strep throat  No sinus drip just a little burning in his throat

## 2019-11-04 NOTE — TELEPHONE ENCOUNTER
I need to go to [place] open today five or near there-is there something we can call in for him to day or see tomorrow

## 2019-11-04 NOTE — TELEPHONE ENCOUNTER
----- Message from Hyun Boudreaux sent at 11/4/2019 12:42 PM CST -----  Contact: [phone]-self  Patient is requesting to be seen today for symptoms of strep. Please call [phone]-self

## 2019-11-05 ENCOUNTER — TELEPHONE (OUTPATIENT)
Dept: FAMILY MEDICINE | Facility: CLINIC | Age: 79
End: 2019-11-05

## 2019-11-05 ENCOUNTER — OFFICE VISIT (OUTPATIENT)
Dept: FAMILY MEDICINE | Facility: CLINIC | Age: 79
End: 2019-11-05
Payer: MEDICARE

## 2019-11-05 VITALS
SYSTOLIC BLOOD PRESSURE: 122 MMHG | BODY MASS INDEX: 31.98 KG/M2 | TEMPERATURE: 98 F | HEIGHT: 68 IN | DIASTOLIC BLOOD PRESSURE: 76 MMHG | HEART RATE: 72 BPM | WEIGHT: 211 LBS | OXYGEN SATURATION: 95 %

## 2019-11-05 DIAGNOSIS — J02.9 PHARYNGITIS, UNSPECIFIED ETIOLOGY: Primary | ICD-10-CM

## 2019-11-05 PROCEDURE — 99213 OFFICE O/P EST LOW 20 MIN: CPT | Mod: S$GLB,,, | Performed by: FAMILY MEDICINE

## 2019-11-05 PROCEDURE — 99213 PR OFFICE/OUTPT VISIT, EST, LEVL III, 20-29 MIN: ICD-10-PCS | Mod: S$GLB,,, | Performed by: FAMILY MEDICINE

## 2019-11-05 NOTE — TELEPHONE ENCOUNTER
----- Message from Brandi Brar sent at 11/5/2019  9:59 AM CST -----  Contact: [phone]-self    Patient is requesting to be seen today for symptoms of strep. Please call

## 2019-11-16 NOTE — PROGRESS NOTES
" Patient ID: Teo Gloria is a 79 y.o. male.    Chief Complaint: Sore Throat    HPI       Teo Gloria is a 79 y.o. male complains of a few day history of sore throat.  No fever chills.  No nausea vomiting diarrhea.  No nasal fullness or congestion.  Use of over-the-counter medications and sprays not fully helping.    Review of Symptoms    Constitutional  Neg activity change, No chills/ fever   Resp  Neg hemoptysis, stridor, choking  CVS  Neg chest pain, palpitations        Physical Exam    Vitals:    11/05/19 1139   BP: 122/76   Pulse: 72   Temp: 98.4 °F (36.9 °C)   SpO2: 95%   Weight: 95.7 kg (210 lb 15.7 oz)   Height: 5' 8" (1.727 m)        Constitutional:   Oriented to person, place, and time.appears well-developed and well-nourished.   No distress.     HENT:   Head: Normocephalic and atraumatic.     Right Ear: Tympanic membrane, external ear and ear canal normal     Left Ear: Tympanic membrane, external ear and ear canal normal    Nose: External Normal. Normal turbinates, No rhinorrhea     Mouth/Throat: Uvula is midline, oropharynx is clear and moist and mucous membranes are normal.     Neck: supple no anterior cervical adenopathy    Carotid artery:  Bruit    NEG []   POS[]    Eyes:   Conjunctivae are normal. Right eye exhibits no discharge. Left eye exhibits no discharge. No scleral icterus. No periorbital edema     Cardiovascular:  Regular rate and rhythm with normal S1 and S2     Pulmonary/Chest:   Clear to auscultation bilaterally without wheezes, rhonchi or rales    Musculoskeletal:  No edema. No obvious deformity No wasting     Neurological:   Alert and oriented to person, place, and time. Coordination normal.     Skin:   Skin is warm and dry.   No diaphoresis.   No rash noted.    Psychiatric: Normal mood and affect. Behavior is normal. Judgment and thought content normal.       Assessment / Plan:      ICD-10-CM ICD-9-CM   1. Pharyngitis, unspecified etiology J02.9 462     Pharyngitis, unspecified " etiology        Over-the-counter treatment-medication given yesterday

## 2019-11-30 ENCOUNTER — EXTERNAL CHRONIC CARE MANAGEMENT (OUTPATIENT)
Dept: PRIMARY CARE CLINIC | Facility: CLINIC | Age: 79
End: 2019-11-30
Payer: MEDICARE

## 2019-11-30 PROCEDURE — 99490 CHRNC CARE MGMT STAFF 1ST 20: CPT | Mod: S$PBB,,, | Performed by: FAMILY MEDICINE

## 2019-11-30 PROCEDURE — 99490 PR CHRONIC CARE MGMT, 1ST 20 MIN: ICD-10-PCS | Mod: S$PBB,,, | Performed by: FAMILY MEDICINE

## 2019-11-30 PROCEDURE — 99490 CHRNC CARE MGMT STAFF 1ST 20: CPT | Mod: PBBFAC | Performed by: FAMILY MEDICINE

## 2019-12-31 ENCOUNTER — EXTERNAL CHRONIC CARE MANAGEMENT (OUTPATIENT)
Dept: PRIMARY CARE CLINIC | Facility: CLINIC | Age: 79
End: 2019-12-31
Payer: MEDICARE

## 2019-12-31 PROCEDURE — 99490 CHRNC CARE MGMT STAFF 1ST 20: CPT | Mod: PBBFAC | Performed by: FAMILY MEDICINE

## 2019-12-31 PROCEDURE — 99490 CHRNC CARE MGMT STAFF 1ST 20: CPT | Mod: S$PBB,,, | Performed by: FAMILY MEDICINE

## 2019-12-31 PROCEDURE — 99490 PR CHRONIC CARE MGMT, 1ST 20 MIN: ICD-10-PCS | Mod: S$PBB,,, | Performed by: FAMILY MEDICINE

## 2020-01-31 ENCOUNTER — EXTERNAL CHRONIC CARE MANAGEMENT (OUTPATIENT)
Dept: PRIMARY CARE CLINIC | Facility: CLINIC | Age: 80
End: 2020-01-31
Payer: MEDICARE

## 2020-01-31 PROCEDURE — 99490 CHRNC CARE MGMT STAFF 1ST 20: CPT | Mod: PBBFAC | Performed by: FAMILY MEDICINE

## 2020-01-31 PROCEDURE — 99490 PR CHRONIC CARE MGMT, 1ST 20 MIN: ICD-10-PCS | Mod: S$PBB,,, | Performed by: FAMILY MEDICINE

## 2020-01-31 PROCEDURE — 99490 CHRNC CARE MGMT STAFF 1ST 20: CPT | Mod: S$PBB,,, | Performed by: FAMILY MEDICINE

## 2020-02-29 ENCOUNTER — EXTERNAL CHRONIC CARE MANAGEMENT (OUTPATIENT)
Dept: PRIMARY CARE CLINIC | Facility: CLINIC | Age: 80
End: 2020-02-29
Payer: MEDICARE

## 2020-02-29 PROCEDURE — 99490 CHRNC CARE MGMT STAFF 1ST 20: CPT | Mod: S$PBB,,, | Performed by: FAMILY MEDICINE

## 2020-02-29 PROCEDURE — 99490 PR CHRONIC CARE MGMT, 1ST 20 MIN: ICD-10-PCS | Mod: S$PBB,,, | Performed by: FAMILY MEDICINE

## 2020-03-31 ENCOUNTER — EXTERNAL CHRONIC CARE MANAGEMENT (OUTPATIENT)
Dept: PRIMARY CARE CLINIC | Facility: CLINIC | Age: 80
End: 2020-03-31
Payer: MEDICARE

## 2020-03-31 PROCEDURE — 99490 PR CHRONIC CARE MGMT, 1ST 20 MIN: ICD-10-PCS | Mod: S$PBB,,, | Performed by: FAMILY MEDICINE

## 2020-03-31 PROCEDURE — 99490 CHRNC CARE MGMT STAFF 1ST 20: CPT | Mod: S$PBB,,, | Performed by: FAMILY MEDICINE

## 2020-04-30 ENCOUNTER — EXTERNAL CHRONIC CARE MANAGEMENT (OUTPATIENT)
Dept: PRIMARY CARE CLINIC | Facility: CLINIC | Age: 80
End: 2020-04-30
Payer: MEDICARE

## 2020-04-30 PROCEDURE — 99490 CHRNC CARE MGMT STAFF 1ST 20: CPT | Mod: S$GLB,,, | Performed by: FAMILY MEDICINE

## 2020-04-30 PROCEDURE — 99490 PR CHRONIC CARE MGMT, 1ST 20 MIN: ICD-10-PCS | Mod: S$GLB,,, | Performed by: FAMILY MEDICINE

## 2020-05-26 ENCOUNTER — TELEPHONE (OUTPATIENT)
Dept: FAMILY MEDICINE | Facility: CLINIC | Age: 80
End: 2020-05-26

## 2020-05-26 NOTE — TELEPHONE ENCOUNTER
----- Message from Justina Boudreaux sent at 5/26/2020  1:27 PM CDT -----  Type:  Needs Medical Advice    Who Called: patient  Reason:  They suspect that his wife has COVID; he needs to schedule a test for himself and a chest xray asap  Would the patient rather a call back or a response via [facility]? call  Best Call Back Number:[phone]  Additional Information: no

## 2020-05-31 ENCOUNTER — EXTERNAL CHRONIC CARE MANAGEMENT (OUTPATIENT)
Dept: PRIMARY CARE CLINIC | Facility: CLINIC | Age: 80
End: 2020-05-31
Payer: MEDICARE

## 2020-05-31 PROCEDURE — 99490 PR CHRONIC CARE MGMT, 1ST 20 MIN: ICD-10-PCS | Mod: S$GLB,,, | Performed by: FAMILY MEDICINE

## 2020-05-31 PROCEDURE — 99490 CHRNC CARE MGMT STAFF 1ST 20: CPT | Mod: S$GLB,,, | Performed by: FAMILY MEDICINE

## 2020-06-02 ENCOUNTER — TELEPHONE (OUTPATIENT)
Dept: FAMILY MEDICINE | Facility: CLINIC | Age: 80
End: 2020-06-02

## 2020-06-02 RX ORDER — TRAZODONE HYDROCHLORIDE 50 MG/1
TABLET ORAL
Qty: 30 TABLET | Refills: 1 | Status: SHIPPED | OUTPATIENT
Start: 2020-06-02 | End: 2020-06-08

## 2020-06-02 NOTE — TELEPHONE ENCOUNTER
----- Message from Reinaldo White sent at 6/2/2020  8:46 AM CDT -----  Contact: patient  Type:  Needs Medical Advice    Who Called: Teo  Would the patient rather a call back or a response via [facility]?  Call back  Best Call Back Number:  [phone]  Additional Information:  Wants to speak with the doctor about an alternative pain medicaiton

## 2020-06-08 RX ORDER — DULOXETIN HYDROCHLORIDE 30 MG/1
30 CAPSULE, DELAYED RELEASE ORAL DAILY
Qty: 30 CAPSULE | Refills: 0 | Status: SHIPPED | OUTPATIENT
Start: 2020-06-08 | End: 2020-08-04

## 2020-06-08 RX ORDER — GABAPENTIN 600 MG/1
600 TABLET ORAL 3 TIMES DAILY
Qty: 270 TABLET | Refills: 3 | Status: SHIPPED | OUTPATIENT
Start: 2020-06-08 | End: 2021-06-28

## 2020-06-08 RX ORDER — GABAPENTIN 300 MG/1
600 CAPSULE ORAL 3 TIMES DAILY
COMMUNITY
Start: 2020-05-18 | End: 2020-08-04

## 2020-06-08 NOTE — TELEPHONE ENCOUNTER
Patient states he only took it the first day he got the prescription. He had diarrhea so bad after that. He hasn't taken it since and the diarrhea hasn't come back since. Patient states he also needs something for his feet. The gabapentin isn't working he was advised to take 1800 MG daily. When he wakes up in the morning the bottom of his feet burn so bad.     ----- Message from Brandi Brar sent at 6/8/2020 12:59 PM CDT -----  Contact: [phone]/self  Patient is requesting a call back. The traZODone (DESYREL) 50 MG tablets are causing him to have severe diarrhea. He is dehydrated and light headed.  Please call him to discuss finding a replacement for it. Thanks

## 2020-06-30 ENCOUNTER — EXTERNAL CHRONIC CARE MANAGEMENT (OUTPATIENT)
Dept: PRIMARY CARE CLINIC | Facility: CLINIC | Age: 80
End: 2020-06-30
Payer: MEDICARE

## 2020-06-30 PROCEDURE — 99490 PR CHRONIC CARE MGMT, 1ST 20 MIN: ICD-10-PCS | Mod: S$GLB,,, | Performed by: FAMILY MEDICINE

## 2020-06-30 PROCEDURE — 99490 CHRNC CARE MGMT STAFF 1ST 20: CPT | Mod: S$GLB,,, | Performed by: FAMILY MEDICINE

## 2020-07-31 ENCOUNTER — EXTERNAL CHRONIC CARE MANAGEMENT (OUTPATIENT)
Dept: PRIMARY CARE CLINIC | Facility: CLINIC | Age: 80
End: 2020-07-31
Payer: MEDICARE

## 2020-07-31 PROCEDURE — 99490 CHRNC CARE MGMT STAFF 1ST 20: CPT | Mod: S$GLB,,, | Performed by: FAMILY MEDICINE

## 2020-07-31 PROCEDURE — 99490 PR CHRONIC CARE MGMT, 1ST 20 MIN: ICD-10-PCS | Mod: S$GLB,,, | Performed by: FAMILY MEDICINE

## 2020-08-03 ENCOUNTER — TELEPHONE (OUTPATIENT)
Dept: FAMILY MEDICINE | Facility: CLINIC | Age: 80
End: 2020-08-03

## 2020-08-03 ENCOUNTER — PATIENT OUTREACH (OUTPATIENT)
Dept: ADMINISTRATIVE | Facility: HOSPITAL | Age: 80
End: 2020-08-03

## 2020-08-03 NOTE — TELEPHONE ENCOUNTER
I spoke with the pt     ROJELIO   He is taking meds for prostate cancer  I have scheduled him tomorrow at 840

## 2020-08-03 NOTE — TELEPHONE ENCOUNTER
----- Message from Adriana Velazquez sent at 8/3/2020  9:18 AM CDT -----  Contact: SELF [phone]  Patient would like to speak with you about being seen today for weakness and needs blood pressure medication refilled.

## 2020-08-04 ENCOUNTER — OFFICE VISIT (OUTPATIENT)
Dept: FAMILY MEDICINE | Facility: CLINIC | Age: 80
End: 2020-08-04
Payer: MEDICARE

## 2020-08-04 VITALS
DIASTOLIC BLOOD PRESSURE: 72 MMHG | BODY MASS INDEX: 33.08 KG/M2 | HEART RATE: 79 BPM | SYSTOLIC BLOOD PRESSURE: 130 MMHG | HEIGHT: 68 IN | TEMPERATURE: 98 F | OXYGEN SATURATION: 96 % | WEIGHT: 218.25 LBS

## 2020-08-04 DIAGNOSIS — R53.83 FATIGUE, UNSPECIFIED TYPE: ICD-10-CM

## 2020-08-04 DIAGNOSIS — I10 ESSENTIAL HYPERTENSION: Primary | ICD-10-CM

## 2020-08-04 DIAGNOSIS — R06.02 SOB (SHORTNESS OF BREATH): ICD-10-CM

## 2020-08-04 DIAGNOSIS — I45.10 RIGHT BUNDLE BRANCH BLOCK: ICD-10-CM

## 2020-08-04 DIAGNOSIS — M79.2 NEURALGIA: ICD-10-CM

## 2020-08-04 PROCEDURE — 99214 PR OFFICE/OUTPT VISIT, EST, LEVL IV, 30-39 MIN: ICD-10-PCS | Mod: S$GLB,,, | Performed by: FAMILY MEDICINE

## 2020-08-04 PROCEDURE — 99214 OFFICE O/P EST MOD 30 MIN: CPT | Mod: S$GLB,,, | Performed by: FAMILY MEDICINE

## 2020-08-04 RX ORDER — AMITRIPTYLINE HYDROCHLORIDE 10 MG/1
TABLET, FILM COATED ORAL
Qty: 90 TABLET | Refills: 1 | Status: SHIPPED | OUTPATIENT
Start: 2020-08-04 | End: 2021-01-01

## 2020-08-04 RX ORDER — PREDNISONE 5 MG/1
TABLET ORAL 2 TIMES DAILY
COMMUNITY
Start: 2020-08-03 | End: 2021-07-23 | Stop reason: SDUPTHER

## 2020-08-04 RX ORDER — LISINOPRIL AND HYDROCHLOROTHIAZIDE 10; 12.5 MG/1; MG/1
1 TABLET ORAL DAILY
Qty: 90 TABLET | Refills: 3 | Status: SHIPPED | OUTPATIENT
Start: 2020-08-04 | End: 2021-08-16 | Stop reason: SDUPTHER

## 2020-08-04 NOTE — PROGRESS NOTES
" Patient ID: Teo Gloria is a 80 y.o. male.    Chief Complaint: Fatigue, Hot Flashes, Prostate Check, and Medication Refill    HPI      Teo Gloria is a 80 y.o. male complains of recent fatigue and weakness.  Patient with history of prostate cancer undergoing hormone blocking therapy.  Has had periodic night sweats and irritability especially nerve ending pain of his lower extremity at night.  Use of gabapentin minimally successful.  Use of Cymbalta cause problems as well as use of trazodone cause insomnia.  Patient complains of increased fatigue.  Not able to do the things he was able to without some shortness of breath or feeling of needing to take a deep breath.  Endurance is decreased.  No delores chest pain or palpitations.  Hypertension-under good control needs refills    Vitals:    08/04/20 0855   BP: 130/72   Pulse: 79   Temp: 98.4 °F (36.9 °C)   SpO2: 96%   Weight: 99 kg (218 lb 4.1 oz)   Height: 5' 8" (1.727 m)            Review of Symptoms    Constitutional  Neg activity change, No chills /fever   Resp  Neg hemoptysis, stridor, choking  CVS  Neg chest pain, palpitations    Physical Exam    Constitutional:  Oriented to person, place, and time.appears well-developed and well-nourished.  No distress.      HENT  Head: Normocephalic and atraumatic  Right Ear: External ear normal.   Left Ear: External ear normal.   Nose: External nose normal.   Mouth:  Moist mucus membranes.    Eyes:  Conjunctivae are normal. Right eye exhibits no discharge.  Left eye exhibits no discharge. No scleral icterus.  No periorbital edema    Cardiovascular:  Regular rate and rhythm with normal S1 and S2     Pulmonary/Chest:   Clear to auscultation bilaterally without wheezes, rhonchi or rales      Musculoskeletal:  No edema. No obvious deformity No wasting       Neurological:  Alert and oriented to person, place, and time.   Coordination normal.     Skin:   Skin is warm and dry.  No diaphoresis.   No rash noted.     Psychiatric: " Normal mood and affect. Behavior is normal.  Judgment and thought content normal.     Complete Blood Count  Lab Results   Component Value Date    RBC 4.35 (L) 09/18/2017    HGB 12.8 (L) 09/18/2017    HCT 36.1 (L) 10/03/2017    MCV 90 09/18/2017    MCH 29.4 09/18/2017    MCHC 32.6 09/18/2017    RDW 13.1 09/18/2017     09/18/2017    MPV 10.5 09/18/2017    GRAN 5.4 09/18/2017    GRAN 61.7 09/18/2017    LYMPH 2.2 09/18/2017    LYMPH 25.1 09/18/2017    MONO 0.8 09/18/2017    MONO 9.1 09/18/2017    EOS 0.3 09/18/2017    BASO 0.04 09/18/2017    EOSINOPHIL 2.9 09/18/2017    BASOPHIL 0.5 09/18/2017    DIFFMETHOD Automated 09/18/2017       Comprehensive Metabolic Panel  No results found for: GLU, BUN, CREATININE, NA, K, CL, PROT, ALBUMIN, BILITOT, AST, ALKPHOS, CO2, ALT, ANIONGAP, EGFRNONAA, ESTGFRAFRICA    TSH  No results found for: TSH    Assessment / Plan:      ICD-10-CM ICD-9-CM   1. Essential hypertension  I10 401.9   2. Right bundle branch block  I45.10 426.4   3. SOB (shortness of breath)  R06.02 786.05   4. Fatigue, unspecified type  R53.83 780.79   5. Neuralgia  M79.2 729.2     Essential hypertension  -     Ambulatory referral/consult to Cardiology; Future; Expected date: 08/11/2020  -     Comprehensive metabolic panel; Future; Expected date: 08/04/2020  -     CBC auto differential; Future; Expected date: 08/04/2020  -     Lipid Panel; Future; Expected date: 08/04/2020  -     TSH; Future; Expected date: 08/04/2020    Right bundle branch block  -     Ambulatory referral/consult to Cardiology; Future; Expected date: 08/11/2020  -     Comprehensive metabolic panel; Future; Expected date: 08/04/2020  -     CBC auto differential; Future; Expected date: 08/04/2020  -     Lipid Panel; Future; Expected date: 08/04/2020  -     TSH; Future; Expected date: 08/04/2020    SOB (shortness of breath)    Fatigue, unspecified type    Neuralgia    Other orders  -     amitriptyline (ELAVIL) 10 MG tablet; One two or three tablets  at night for sleep and nerve pain.  Dispense: 90 tablet; Refill: 1  -     lisinopriL-hydrochlorothiazide (PRINZIDE,ZESTORETIC) 10-12.5 mg per tablet; Take 1 tablet by mouth once daily.  Dispense: 90 tablet; Refill: 3     Discussed Elavil is a pros and cons -10 milligram tablets one two or three each night-can cause sedation so be careful as he arises      Discussed follow-up with Cardiology-doubt cardiac issues however shortness of breath is concerning

## 2020-08-05 ENCOUNTER — TELEPHONE (OUTPATIENT)
Dept: CARDIOLOGY | Facility: CLINIC | Age: 80
End: 2020-08-05

## 2020-08-05 LAB
CHOLEST SERPL-MCNC: 243 MG/DL
CHOLEST/HDLC SERPL: 2.8 (CALC)
HDLC SERPL-MCNC: 87 MG/DL
LDLC SERPL CALC-MCNC: 135 MG/DL (CALC)
NONHDLC SERPL-MCNC: 156 MG/DL (CALC)
TRIGL SERPL-MCNC: 106 MG/DL
TSH SERPL-ACNC: 2.55 MIU/L (ref 0.4–4.5)

## 2020-08-05 NOTE — TELEPHONE ENCOUNTER
----- Message from Sakina Alamo sent at 8/5/2020  2:39 PM CDT -----  Contact: [phone]/Self  Type:  Patient Returning Call    Who Called:Pt  Who Left Message for Patient:Daphne   Does the patient know what this is regarding?:Fill out paperwork   Would the patient rather a call back or a response via [facility]? Call back   Best Call Back Number:[phone]  Additional Information:

## 2020-08-05 NOTE — TELEPHONE ENCOUNTER
----- Message from Kristal Jean sent at 8/5/2020 10:04 AM CDT -----  Contact: Patient Called [phone]  Wanting to know if he can come into office on today and fill out paperwork before his upcoming apt on 8/14/20

## 2020-08-05 NOTE — TELEPHONE ENCOUNTER
Reached out to pt in regards to message     Pt was requesting to know if he could give us a list of his medications and verify insurance information    Informed pt that everything is updated in chart and will review everything when we see him in clinic

## 2020-08-07 ENCOUNTER — TELEPHONE (OUTPATIENT)
Dept: FAMILY MEDICINE | Facility: CLINIC | Age: 80
End: 2020-08-07

## 2020-08-07 NOTE — TELEPHONE ENCOUNTER
[]Hide copied text    []Hover for details  Your labs were drawn-the TSH and cholesterol profile were done-this shows that there both normal.  For some reason your CBC and comprehensive metabolic panel were not drawn            Can he get then done with the current orders or do we need to order them again?

## 2020-08-07 NOTE — TELEPHONE ENCOUNTER
Your labs were drawn-the TSH and cholesterol profile were done-this shows that there both normal.  For some reason your CBC and comprehensive metabolic panel were not drawn.

## 2020-08-07 NOTE — TELEPHONE ENCOUNTER
I spoke with the pt and advised tsh and lipid were nml. I advised him there are 2 more test that [facility] did not draw that needs to be be done    He will go next week

## 2020-08-11 ENCOUNTER — TELEPHONE (OUTPATIENT)
Dept: FAMILY MEDICINE | Facility: CLINIC | Age: 80
End: 2020-08-11

## 2020-08-11 LAB
ALBUMIN SERPL-MCNC: 3.9 G/DL (ref 3.6–5.1)
ALBUMIN/GLOB SERPL: 1.7 (CALC) (ref 1–2.5)
ALP SERPL-CCNC: 30 U/L (ref 35–144)
ALT SERPL-CCNC: 16 U/L (ref 9–46)
AST SERPL-CCNC: 19 U/L (ref 10–35)
BASOPHILS # BLD AUTO: 82 CELLS/UL (ref 0–200)
BASOPHILS NFR BLD AUTO: 1 %
BILIRUB SERPL-MCNC: 0.8 MG/DL (ref 0.2–1.2)
BUN SERPL-MCNC: 29 MG/DL (ref 7–25)
BUN/CREAT SERPL: 29 (CALC) (ref 6–22)
CALCIUM SERPL-MCNC: 9.4 MG/DL (ref 8.6–10.3)
CHLORIDE SERPL-SCNC: 101 MMOL/L (ref 98–110)
CO2 SERPL-SCNC: 28 MMOL/L (ref 20–32)
CREAT SERPL-MCNC: 1.01 MG/DL (ref 0.7–1.11)
EOSINOPHIL # BLD AUTO: 271 CELLS/UL (ref 15–500)
EOSINOPHIL NFR BLD AUTO: 3.3 %
ERYTHROCYTE [DISTWIDTH] IN BLOOD BY AUTOMATED COUNT: 12.5 % (ref 11–15)
GFRSERPLBLD MDRD-ARVRAT: 69 ML/MIN/1.73M2
GLOBULIN SER CALC-MCNC: 2.3 G/DL (CALC) (ref 1.9–3.7)
GLUCOSE SERPL-MCNC: 85 MG/DL (ref 65–99)
HCT VFR BLD AUTO: 38.7 % (ref 38.5–50)
HGB BLD-MCNC: 12.7 G/DL (ref 13.2–17.1)
LYMPHOCYTES # BLD AUTO: 2247 CELLS/UL (ref 850–3900)
LYMPHOCYTES NFR BLD AUTO: 27.4 %
MCH RBC QN AUTO: 30.3 PG (ref 27–33)
MCHC RBC AUTO-ENTMCNC: 32.8 G/DL (ref 32–36)
MCV RBC AUTO: 92.4 FL (ref 80–100)
MONOCYTES # BLD AUTO: 845 CELLS/UL (ref 200–950)
MONOCYTES NFR BLD AUTO: 10.3 %
NEUTROPHILS # BLD AUTO: 4756 CELLS/UL (ref 1500–7800)
NEUTROPHILS NFR BLD AUTO: 58 %
PLATELET # BLD AUTO: 211 THOUSAND/UL (ref 140–400)
PMV BLD REES-ECKER: 11.4 FL (ref 7.5–12.5)
POTASSIUM SERPL-SCNC: 3.9 MMOL/L (ref 3.5–5.3)
PROT SERPL-MCNC: 6.2 G/DL (ref 6.1–8.1)
RBC # BLD AUTO: 4.19 MILLION/UL (ref 4.2–5.8)
SODIUM SERPL-SCNC: 139 MMOL/L (ref 135–146)
WBC # BLD AUTO: 8.2 THOUSAND/UL (ref 3.8–10.8)

## 2020-08-12 NOTE — TELEPHONE ENCOUNTER
PATIENT NOTIFIED of lab results.  No further action needed at this time.      ----- Message from Bridgette Baldwin sent at 8/12/2020  3:51 PM CDT -----  Regarding: Missed Jena call  Pt missed a call and would like the nurse to return their call at [phone]        Thank you!

## 2020-08-14 ENCOUNTER — OFFICE VISIT (OUTPATIENT)
Dept: CARDIOLOGY | Facility: CLINIC | Age: 80
End: 2020-08-14
Payer: MEDICARE

## 2020-08-14 VITALS
SYSTOLIC BLOOD PRESSURE: 155 MMHG | HEART RATE: 63 BPM | BODY MASS INDEX: 33.44 KG/M2 | DIASTOLIC BLOOD PRESSURE: 76 MMHG | OXYGEN SATURATION: 96 % | WEIGHT: 219.88 LBS

## 2020-08-14 DIAGNOSIS — I10 ESSENTIAL HYPERTENSION: ICD-10-CM

## 2020-08-14 DIAGNOSIS — I45.10 RIGHT BUNDLE BRANCH BLOCK: ICD-10-CM

## 2020-08-14 DIAGNOSIS — I45.10 RBBB: ICD-10-CM

## 2020-08-14 DIAGNOSIS — R06.02 SOB (SHORTNESS OF BREATH): Primary | ICD-10-CM

## 2020-08-14 DIAGNOSIS — R01.1 CARDIAC MURMUR: ICD-10-CM

## 2020-08-14 DIAGNOSIS — I83.93 VARICOSE VEINS OF BOTH LOWER EXTREMITIES, UNSPECIFIED WHETHER COMPLICATED: ICD-10-CM

## 2020-08-14 PROCEDURE — 99204 PR OFFICE/OUTPT VISIT, NEW, LEVL IV, 45-59 MIN: ICD-10-PCS | Mod: S$GLB,,, | Performed by: INTERNAL MEDICINE

## 2020-08-14 PROCEDURE — 99204 OFFICE O/P NEW MOD 45 MIN: CPT | Mod: S$GLB,,, | Performed by: INTERNAL MEDICINE

## 2020-08-14 NOTE — PROGRESS NOTES
Subjective:    Patient ID:  Teo Gloria is a 80 y.o. male who presents for evaluation of Shortness of Breath      HPI    79 y/o male with hx of HTN, prostate CA, HTN, RBBB, who presents for evaluation. Has been having fatigue and SOB mostly with exertion. Also has intermittent palps. Denies CP, orthopnea, PND, syncope. Compliant with meds and attempts to stay active. Non smoker.    Review of Systems   Constitution: Negative for malaise/fatigue.   HENT: Negative for congestion.    Eyes: Negative for blurred vision.   Cardiovascular: Positive for dyspnea on exertion. Negative for chest pain, claudication, cyanosis, irregular heartbeat, leg swelling, near-syncope, orthopnea, palpitations, paroxysmal nocturnal dyspnea and syncope.   Respiratory: Negative for shortness of breath.    Endocrine: Negative for polyuria.   Hematologic/Lymphatic: Negative for bleeding problem.   Skin: Negative for itching and rash.   Musculoskeletal: Negative for joint swelling, muscle cramps and muscle weakness.   Gastrointestinal: Negative for abdominal pain, hematemesis, hematochezia, melena, nausea and vomiting.   Genitourinary: Negative for dysuria and hematuria.   Neurological: Negative for dizziness, focal weakness, headaches, light-headedness, loss of balance and weakness.   Psychiatric/Behavioral: Negative for depression. The patient is not nervous/anxious.         Objective:    Physical Exam   Constitutional: He is oriented to person, place, and time. He appears well-developed and well-nourished.   HENT:   Head: Normocephalic and atraumatic.   Neck: Neck supple. No JVD present.   Cardiovascular: Normal rate, regular rhythm and normal heart sounds.   Pulses:       Carotid pulses are 2+ on the right side and 2+ on the left side.       Radial pulses are 2+ on the right side and 2+ on the left side.        Femoral pulses are 2+ on the right side and 2+ on the left side.       Dorsalis pedis pulses are 2+ on the right side and 2+ on  the left side.        Posterior tibial pulses are 2+ on the right side and 2+ on the left side.   Pulmonary/Chest: Effort normal and breath sounds normal.   Abdominal: Soft. Bowel sounds are normal.   Musculoskeletal:         General: No edema.   Neurological: He is alert and oriented to person, place, and time.   Skin: Skin is warm and dry.   Psychiatric: He has a normal mood and affect. His behavior is normal. Thought content normal.         Assessment:       1. SOB (shortness of breath)    2. Essential hypertension    3. Right bundle branch block    4. RBBB    5. Varicose veins of both lower extremities, unspecified whether complicated    6. Cardiac murmur      79 y/o pt with hx and presentation as above. Doing well from a cardiac perspective and compensated from a HF perspective. Will evaluate symptoms with 2DE and Holter. Needs to stay active. Discussed the etiology, evaluation, and management of HTN, RBBB, SOB, CAD. Discussed the importance of med compliance, heart healthy diet, and regular exercise.        Plan:       -2DE  -Holter x 48 hours  -f/u phone review

## 2020-08-14 NOTE — LETTER
August 27, 2020      Fredis Hector MD  [address] [facility] [address]           [facility]  [address] [place] [address]  [place] [address]  Phone: [phone]  Fax: [phone]          Patient: Teo Gloria   MR Number: [id number]   YOB: 1940   Date of Visit: 8/14/2020       Dear Dr. Fredis Hector:    Thank you for referring Teo Gloria to me for evaluation. Attached you will find relevant portions of my assessment and plan of care.    If you have questions, please do not hesitate to call me. I look forward to following Teo Gloria along with you.    Sincerely,    Phillip De Leon MD    Enclosure  CC:  No Recipients    If you would like to receive this communication electronically, please contact [email or web address] or [phone] to request more information on xiao qu wu you Link access.    For providers and/or their staff who would like to refer a patient to [facility], please contact us through our one-stop-shop provider referral line, [facility], at [phone].    If you feel you have received this communication in error or would no longer like to receive these types of communications, please e-mail [email or web address]

## 2020-08-16 ENCOUNTER — CLINICAL SUPPORT (OUTPATIENT)
Dept: CARDIOLOGY | Facility: CLINIC | Age: 80
End: 2020-08-16
Attending: INTERNAL MEDICINE
Payer: MEDICARE

## 2020-08-16 DIAGNOSIS — R06.02 SOB (SHORTNESS OF BREATH): ICD-10-CM

## 2020-08-16 PROCEDURE — 93224 HOLTER MONITOR - 48 HOUR (CUPID ONLY): ICD-10-PCS | Mod: S$GLB,,, | Performed by: INTERNAL MEDICINE

## 2020-08-16 PROCEDURE — 93224 XTRNL ECG REC UP TO 48 HRS: CPT | Mod: S$GLB,,, | Performed by: INTERNAL MEDICINE

## 2020-08-18 LAB
OHS CV EVENT MONITOR DAY: 0
OHS CV HOLTER LENGTH DECIMAL HOURS: 48
OHS CV HOLTER LENGTH HOURS: 48
OHS CV HOLTER LENGTH MINUTES: 0

## 2020-08-20 ENCOUNTER — HOSPITAL ENCOUNTER (OUTPATIENT)
Dept: CARDIOLOGY | Facility: HOSPITAL | Age: 80
Discharge: HOME | End: 2020-08-20
Attending: INTERNAL MEDICINE
Payer: MEDICARE

## 2020-08-20 VITALS — WEIGHT: 219 LBS | HEIGHT: 68 IN | BODY MASS INDEX: 33.19 KG/M2

## 2020-08-20 DIAGNOSIS — R06.02 SOB (SHORTNESS OF BREATH): ICD-10-CM

## 2020-08-20 PROCEDURE — 93306 TTE W/DOPPLER COMPLETE: CPT | Mod: PO

## 2020-08-20 PROCEDURE — 93306 ECHO (CUPID ONLY): ICD-10-PCS | Mod: 26,,, | Performed by: INTERNAL MEDICINE

## 2020-08-20 PROCEDURE — 93306 TTE W/DOPPLER COMPLETE: CPT | Mod: 26,,, | Performed by: INTERNAL MEDICINE

## 2020-08-22 LAB
AORTIC ROOT ANNULUS: 2.1 CM
AV INDEX (PROSTH): 0.64
AV MEAN GRADIENT: 20 MMHG
AV PEAK GRADIENT: 29 MMHG
AV VALVE AREA: 2.43 CM2
AV VELOCITY RATIO: 0.56
BSA FOR ECHO PROCEDURE: 2.18 M2
CV ECHO LV RWT: 0.54 CM
DOP CALC AO PEAK VEL: 2.71 M/S
DOP CALC AO VTI: 56.6 CM
DOP CALC LVOT AREA: 3.8 CM2
DOP CALC LVOT DIAMETER: 2.2 CM
DOP CALC LVOT PEAK VEL: 1.53 M/S
DOP CALC LVOT STROKE VOLUME: 137.65 CM3
DOP CALCLVOT PEAK VEL VTI: 36.23 CM
E WAVE DECELERATION TIME: 219.47 MSEC
E/A RATIO: 0.74
E/E' RATIO: 7.57 M/S
ECHO LV POSTERIOR WALL: 1.34 CM (ref 0.6–1.1)
FRACTIONAL SHORTENING: 39 % (ref 28–44)
INTERVENTRICULAR SEPTUM: 1.51 CM (ref 0.6–1.1)
IVC PROX: 1.5 CM
LA MAJOR: 5.05 CM
LA MINOR: 4.78 CM
LA WIDTH: 4.08 CM
LEFT ATRIUM SIZE: 3.72 CM
LEFT ATRIUM VOLUME INDEX: 29.8 ML/M2
LEFT ATRIUM VOLUME: 63.36 CM3
LEFT INTERNAL DIMENSION IN SYSTOLE: 3.05 CM (ref 2.1–4)
LEFT VENTRICLE DIASTOLIC VOLUME INDEX: 54.96 ML/M2
LEFT VENTRICLE DIASTOLIC VOLUME: 116.74 ML
LEFT VENTRICLE MASS INDEX: 139 G/M2
LEFT VENTRICLE SYSTOLIC VOLUME INDEX: 17.2 ML/M2
LEFT VENTRICLE SYSTOLIC VOLUME: 36.53 ML
LEFT VENTRICULAR INTERNAL DIMENSION IN DIASTOLE: 4.97 CM (ref 3.5–6)
LEFT VENTRICULAR MASS: 296.33 G
LV LATERAL E/E' RATIO: 7.57 M/S
LV SEPTAL E/E' RATIO: 7.57 M/S
MV A" WAVE DURATION": 111 MSEC
MV PEAK A VEL: 0.72 M/S
MV PEAK E VEL: 0.53 M/S
MV STENOSIS PRESSURE HALF TIME: 63.65 MS
MV VALVE AREA P 1/2 METHOD: 3.46 CM2
PISA MRMAX VEL: 0.05 M/S
PISA TR MAX VEL: 2.66 M/S
PULM VEIN A" WAVE DURATION": 157 MSEC
PULM VEIN S/D RATIO: 1.43
PV PEAK D VEL: 0.35 M/S
PV PEAK S VEL: 0.5 M/S
PV PEAK VELOCITY: 1.72 CM/S
RA MAJOR: 4.48 CM
RA WIDTH: 3.13 CM
TDI LATERAL: 0.07 M/S
TDI SEPTAL: 0.07 M/S
TDI: 0.07 M/S
TR MAX PG: 28 MMHG
TRICUSPID ANNULAR PLANE SYSTOLIC EXCURSION: 2.43 CM

## 2020-08-31 ENCOUNTER — EXTERNAL CHRONIC CARE MANAGEMENT (OUTPATIENT)
Dept: PRIMARY CARE CLINIC | Facility: CLINIC | Age: 80
End: 2020-08-31
Payer: MEDICARE

## 2020-08-31 ENCOUNTER — TELEPHONE (OUTPATIENT)
Dept: CARDIOLOGY | Facility: CLINIC | Age: 80
End: 2020-08-31

## 2020-08-31 PROCEDURE — 99490 CHRNC CARE MGMT STAFF 1ST 20: CPT | Mod: S$GLB,,, | Performed by: FAMILY MEDICINE

## 2020-08-31 PROCEDURE — 99490 PR CHRONIC CARE MGMT, 1ST 20 MIN: ICD-10-PCS | Mod: S$GLB,,, | Performed by: FAMILY MEDICINE

## 2020-08-31 NOTE — TELEPHONE ENCOUNTER
Reached out to pt and advised about test results     Result letter also mailed out to pt per his request

## 2020-09-30 ENCOUNTER — EXTERNAL CHRONIC CARE MANAGEMENT (OUTPATIENT)
Dept: PRIMARY CARE CLINIC | Facility: CLINIC | Age: 80
End: 2020-09-30
Payer: MEDICARE

## 2020-09-30 PROCEDURE — 99490 CHRNC CARE MGMT STAFF 1ST 20: CPT | Mod: S$GLB,,, | Performed by: FAMILY MEDICINE

## 2020-09-30 PROCEDURE — 99490 PR CHRONIC CARE MGMT, 1ST 20 MIN: ICD-10-PCS | Mod: S$GLB,,, | Performed by: FAMILY MEDICINE

## 2020-10-31 ENCOUNTER — EXTERNAL CHRONIC CARE MANAGEMENT (OUTPATIENT)
Dept: PRIMARY CARE CLINIC | Facility: CLINIC | Age: 80
End: 2020-10-31
Payer: MEDICARE

## 2020-10-31 PROCEDURE — 99490 PR CHRONIC CARE MGMT, 1ST 20 MIN: ICD-10-PCS | Mod: S$GLB,,, | Performed by: FAMILY MEDICINE

## 2020-10-31 PROCEDURE — 99490 CHRNC CARE MGMT STAFF 1ST 20: CPT | Mod: S$GLB,,, | Performed by: FAMILY MEDICINE

## 2020-11-30 ENCOUNTER — EXTERNAL CHRONIC CARE MANAGEMENT (OUTPATIENT)
Dept: PRIMARY CARE CLINIC | Facility: CLINIC | Age: 80
End: 2020-11-30
Payer: MEDICARE

## 2020-11-30 PROCEDURE — 99490 CHRNC CARE MGMT STAFF 1ST 20: CPT | Mod: S$GLB,,, | Performed by: FAMILY MEDICINE

## 2020-11-30 PROCEDURE — 99490 PR CHRONIC CARE MGMT, 1ST 20 MIN: ICD-10-PCS | Mod: S$GLB,,, | Performed by: FAMILY MEDICINE

## 2020-12-09 ENCOUNTER — TELEPHONE (OUTPATIENT)
Dept: FAMILY MEDICINE | Facility: CLINIC | Age: 80
End: 2020-12-09

## 2020-12-09 RX ORDER — TIZANIDINE 2 MG/1
TABLET ORAL
Qty: 25 TABLET | Refills: 0 | Status: SHIPPED | OUTPATIENT
Start: 2020-12-09 | End: 2021-07-23

## 2020-12-09 NOTE — TELEPHONE ENCOUNTER
----- Message from Lilliam Doreen sent at 12/9/2020  9:57 AM CST -----  Contact: pt, [phone]  Patient requests to be seen today, preferably in the morning due to nurse assisting paralyzed spouse leaving at 3 pm.     States he needs to see someone today, thinks it's a back muscle pain, unable to sleep and having to use walker. Please advise.    The pt declined the only appts I had available because it was after 3 today    He request that you call in meds for the muscle pain  [facility]  As he was not even able to sleep bc of the pain    Please advise

## 2020-12-29 ENCOUNTER — OFFICE VISIT (OUTPATIENT)
Dept: FAMILY MEDICINE | Facility: CLINIC | Age: 80
End: 2020-12-29
Payer: MEDICARE

## 2020-12-29 VITALS
WEIGHT: 221.69 LBS | SYSTOLIC BLOOD PRESSURE: 136 MMHG | HEIGHT: 68 IN | HEART RATE: 65 BPM | OXYGEN SATURATION: 96 % | TEMPERATURE: 97 F | DIASTOLIC BLOOD PRESSURE: 76 MMHG | BODY MASS INDEX: 33.6 KG/M2

## 2020-12-29 DIAGNOSIS — R60.0 BILATERAL LEG EDEMA: Primary | ICD-10-CM

## 2020-12-29 PROCEDURE — 99213 PR OFFICE/OUTPT VISIT, EST, LEVL III, 20-29 MIN: ICD-10-PCS | Mod: S$GLB,,, | Performed by: FAMILY MEDICINE

## 2020-12-29 PROCEDURE — 99213 OFFICE O/P EST LOW 20 MIN: CPT | Mod: S$GLB,,, | Performed by: FAMILY MEDICINE

## 2020-12-29 RX ORDER — FUROSEMIDE 20 MG/1
20 TABLET ORAL DAILY
Qty: 30 TABLET | Refills: 1 | Status: SHIPPED | OUTPATIENT
Start: 2020-12-29 | End: 2021-01-22

## 2020-12-30 ENCOUNTER — TELEPHONE (OUTPATIENT)
Dept: FAMILY MEDICINE | Facility: CLINIC | Age: 80
End: 2020-12-30

## 2020-12-30 NOTE — TELEPHONE ENCOUNTER
Discussed with patient-declined pain medicine tonight-a little better this afternoon will continue fluid medications-if still irritating tomorrow morning or erythematous will call and may call in medications such as prednisone?  Gout

## 2020-12-30 NOTE — TELEPHONE ENCOUNTER
Pt states all last night he could not get comfortable with his left arm pain. Pt did not take anything for the pain. Now this morning his arm is better, and it is his left ankle that is hurting. Pt urinated all last night. He started the fluid pills this morning    ----- Message from Brandi Brar sent at 12/30/2020  8:44 AM CST -----  Regarding: advice  Contact: [phone]/self  Type:  Needs Medical Advice    Who Called:  self  Symptoms (please be specific):  pain in his left arm   How long has patient had these symptoms:   all night long  Pharmacy name and phone #:   [id number] - [facility], [address];  Would the patient rather a call back or a response via [facility]?  call  Best Call Back Number:  [phone]/self  Additional Information:  declined going to the ER

## 2020-12-31 ENCOUNTER — EXTERNAL CHRONIC CARE MANAGEMENT (OUTPATIENT)
Dept: PRIMARY CARE CLINIC | Facility: CLINIC | Age: 80
End: 2020-12-31
Payer: MEDICARE

## 2020-12-31 ENCOUNTER — TELEPHONE (OUTPATIENT)
Dept: FAMILY MEDICINE | Facility: CLINIC | Age: 80
End: 2020-12-31

## 2020-12-31 PROCEDURE — 99490 PR CHRONIC CARE MGMT, 1ST 20 MIN: ICD-10-PCS | Mod: S$GLB,,, | Performed by: FAMILY MEDICINE

## 2020-12-31 PROCEDURE — 99490 CHRNC CARE MGMT STAFF 1ST 20: CPT | Mod: S$GLB,,, | Performed by: FAMILY MEDICINE

## 2021-01-21 ENCOUNTER — TELEPHONE (OUTPATIENT)
Dept: FAMILY MEDICINE | Facility: CLINIC | Age: 81
End: 2021-01-21

## 2021-01-22 ENCOUNTER — OFFICE VISIT (OUTPATIENT)
Dept: FAMILY MEDICINE | Facility: CLINIC | Age: 81
End: 2021-01-22
Payer: MEDICARE

## 2021-01-22 VITALS
BODY MASS INDEX: 33.13 KG/M2 | HEIGHT: 68 IN | DIASTOLIC BLOOD PRESSURE: 58 MMHG | OXYGEN SATURATION: 95 % | TEMPERATURE: 98 F | HEART RATE: 76 BPM | SYSTOLIC BLOOD PRESSURE: 114 MMHG | WEIGHT: 218.56 LBS

## 2021-01-22 DIAGNOSIS — R60.0 BILATERAL LEG EDEMA: ICD-10-CM

## 2021-01-22 DIAGNOSIS — I51.7 VENTRICULAR HYPERTROPHY: ICD-10-CM

## 2021-01-22 DIAGNOSIS — I10 ESSENTIAL HYPERTENSION: Primary | ICD-10-CM

## 2021-01-22 PROCEDURE — 99213 PR OFFICE/OUTPT VISIT, EST, LEVL III, 20-29 MIN: ICD-10-PCS | Mod: S$GLB,,, | Performed by: FAMILY MEDICINE

## 2021-01-22 PROCEDURE — 99213 OFFICE O/P EST LOW 20 MIN: CPT | Mod: S$GLB,,, | Performed by: FAMILY MEDICINE

## 2021-01-22 RX ORDER — FUROSEMIDE 40 MG/1
40 TABLET ORAL DAILY
Qty: 30 TABLET | Refills: 0 | Status: SHIPPED | OUTPATIENT
Start: 2021-01-22 | End: 2021-04-01 | Stop reason: SDUPTHER

## 2021-01-31 ENCOUNTER — EXTERNAL CHRONIC CARE MANAGEMENT (OUTPATIENT)
Dept: PRIMARY CARE CLINIC | Facility: CLINIC | Age: 81
End: 2021-01-31
Payer: MEDICARE

## 2021-01-31 PROCEDURE — 99490 PR CHRONIC CARE MGMT, 1ST 20 MIN: ICD-10-PCS | Mod: S$GLB,,, | Performed by: FAMILY MEDICINE

## 2021-01-31 PROCEDURE — 99490 CHRNC CARE MGMT STAFF 1ST 20: CPT | Mod: S$GLB,,, | Performed by: FAMILY MEDICINE

## 2021-02-02 ENCOUNTER — TELEPHONE (OUTPATIENT)
Dept: CARDIOLOGY | Facility: CLINIC | Age: 81
End: 2021-02-02

## 2021-02-02 ENCOUNTER — TELEPHONE (OUTPATIENT)
Dept: FAMILY MEDICINE | Facility: CLINIC | Age: 81
End: 2021-02-02

## 2021-02-02 DIAGNOSIS — I10 ESSENTIAL HYPERTENSION: Primary | ICD-10-CM

## 2021-02-02 LAB
BNP SERPL-MCNC: 100 PG/ML
BUN SERPL-MCNC: 44 MG/DL (ref 7–25)
BUN/CREAT SERPL: 35 (CALC) (ref 6–22)
CALCIUM SERPL-MCNC: 8.9 MG/DL (ref 8.6–10.3)
CHLORIDE SERPL-SCNC: 104 MMOL/L (ref 98–110)
CO2 SERPL-SCNC: 27 MMOL/L (ref 20–32)
CREAT SERPL-MCNC: 1.27 MG/DL (ref 0.7–1.11)
GFRSERPLBLD MDRD-ARVRAT: 53 ML/MIN/1.73M2
GLUCOSE SERPL-MCNC: 79 MG/DL (ref 65–99)
POTASSIUM SERPL-SCNC: 3.9 MMOL/L (ref 3.5–5.3)
SODIUM SERPL-SCNC: 142 MMOL/L (ref 135–146)

## 2021-02-03 ENCOUNTER — TELEPHONE (OUTPATIENT)
Dept: FAMILY MEDICINE | Facility: CLINIC | Age: 81
End: 2021-02-03

## 2021-02-08 ENCOUNTER — OFFICE VISIT (OUTPATIENT)
Dept: CARDIOLOGY | Facility: CLINIC | Age: 81
End: 2021-02-08
Payer: MEDICARE

## 2021-02-08 VITALS
SYSTOLIC BLOOD PRESSURE: 127 MMHG | OXYGEN SATURATION: 96 % | HEART RATE: 61 BPM | WEIGHT: 217.88 LBS | BODY MASS INDEX: 33.13 KG/M2 | DIASTOLIC BLOOD PRESSURE: 58 MMHG

## 2021-02-08 DIAGNOSIS — R07.89 OTHER CHEST PAIN: ICD-10-CM

## 2021-02-08 DIAGNOSIS — I47.19 ATRIAL TACHYCARDIA: ICD-10-CM

## 2021-02-08 DIAGNOSIS — I10 ESSENTIAL HYPERTENSION: ICD-10-CM

## 2021-02-08 DIAGNOSIS — R06.09 DYSPNEA ON EXERTION: ICD-10-CM

## 2021-02-08 DIAGNOSIS — I49.1 PAC (PREMATURE ATRIAL CONTRACTION): ICD-10-CM

## 2021-02-08 DIAGNOSIS — I20.89 ANGINAL EQUIVALENT: Primary | ICD-10-CM

## 2021-02-08 DIAGNOSIS — I51.7 VENTRICULAR HYPERTROPHY: ICD-10-CM

## 2021-02-08 DIAGNOSIS — R60.0 BILATERAL LEG EDEMA: ICD-10-CM

## 2021-02-08 PROCEDURE — 99214 OFFICE O/P EST MOD 30 MIN: CPT | Mod: S$GLB,,, | Performed by: INTERNAL MEDICINE

## 2021-02-08 PROCEDURE — 99214 PR OFFICE/OUTPT VISIT, EST, LEVL IV, 30-39 MIN: ICD-10-PCS | Mod: S$GLB,,, | Performed by: INTERNAL MEDICINE

## 2021-02-08 RX ORDER — METOPROLOL SUCCINATE 25 MG/1
12.5 TABLET, EXTENDED RELEASE ORAL DAILY
Qty: 15 TABLET | Refills: 11 | Status: SHIPPED | OUTPATIENT
Start: 2021-02-08 | End: 2021-03-01 | Stop reason: SDUPTHER

## 2021-02-23 ENCOUNTER — HOSPITAL ENCOUNTER (OUTPATIENT)
Dept: CARDIOLOGY | Facility: HOSPITAL | Age: 81
Discharge: HOME | End: 2021-02-23
Attending: INTERNAL MEDICINE
Payer: MEDICARE

## 2021-02-23 ENCOUNTER — HOSPITAL ENCOUNTER (OUTPATIENT)
Dept: RADIOLOGY | Facility: HOSPITAL | Age: 81
Discharge: HOME | End: 2021-02-23
Attending: INTERNAL MEDICINE
Payer: MEDICARE

## 2021-02-23 ENCOUNTER — HOSPITAL ENCOUNTER (OUTPATIENT)
Dept: RADIOLOGY | Facility: HOSPITAL | Age: 81
Discharge: HOME | End: 2021-02-23
Attending: UROLOGY
Payer: MEDICARE

## 2021-02-23 DIAGNOSIS — R07.89 OTHER CHEST PAIN: ICD-10-CM

## 2021-02-23 DIAGNOSIS — R35.0 FREQUENT URINATION: ICD-10-CM

## 2021-02-23 DIAGNOSIS — R79.89 HIGH CREATININE: ICD-10-CM

## 2021-02-23 DIAGNOSIS — R94.4 DECREASED GFR: ICD-10-CM

## 2021-02-23 DIAGNOSIS — R06.09 DYSPNEA ON EXERTION: ICD-10-CM

## 2021-02-23 DIAGNOSIS — I20.89 ANGINAL EQUIVALENT: ICD-10-CM

## 2021-02-23 LAB
CV STRESS BASE HR: 56 BPM
DIASTOLIC BLOOD PRESSURE: 83 MMHG
OHS CV CPX 1 MINUTE RECOVERY HEART RATE: 68 BPM
OHS CV CPX 85 PERCENT MAX PREDICTED HEART RATE MALE: 118
OHS CV CPX MAX PREDICTED HEART RATE: 139
OHS CV CPX PATIENT IS FEMALE: 0
OHS CV CPX PATIENT IS MALE: 1
OHS CV CPX PEAK DIASTOLIC BLOOD PRESSURE: 83 MMHG
OHS CV CPX PEAK HEAR RATE: 75 BPM
OHS CV CPX PEAK RATE PRESSURE PRODUCT: NORMAL
OHS CV CPX PEAK SYSTOLIC BLOOD PRESSURE: 158 MMHG
OHS CV CPX PERCENT MAX PREDICTED HEART RATE ACHIEVED: 54
OHS CV CPX RATE PRESSURE PRODUCT PRESENTING: 8848
SYSTOLIC BLOOD PRESSURE: 158 MMHG

## 2021-02-23 PROCEDURE — 63600175 PHARM REV CODE 636 W HCPCS: Mod: PO | Performed by: INTERNAL MEDICINE

## 2021-02-23 PROCEDURE — 93018 CV STRESS TEST I&R ONLY: CPT | Mod: ,,, | Performed by: INTERNAL MEDICINE

## 2021-02-23 PROCEDURE — A9502 TC99M TETROFOSMIN: HCPCS | Mod: PO

## 2021-02-23 PROCEDURE — 93017 CV STRESS TEST TRACING ONLY: CPT | Mod: PO

## 2021-02-23 PROCEDURE — 93016 NUCLEAR STRESS TEST (CUPID ONLY): ICD-10-PCS | Mod: ,,, | Performed by: INTERNAL MEDICINE

## 2021-02-23 PROCEDURE — 93016 CV STRESS TEST SUPVJ ONLY: CPT | Mod: ,,, | Performed by: INTERNAL MEDICINE

## 2021-02-23 PROCEDURE — 78452 HT MUSCLE IMAGE SPECT MULT: CPT | Mod: TC,PO

## 2021-02-23 PROCEDURE — 93018 NUCLEAR STRESS TEST (CUPID ONLY): ICD-10-PCS | Mod: ,,, | Performed by: INTERNAL MEDICINE

## 2021-02-23 PROCEDURE — 76770 US EXAM ABDO BACK WALL COMP: CPT | Mod: TC,PO

## 2021-02-23 RX ORDER — REGADENOSON 0.08 MG/ML
0.4 INJECTION, SOLUTION INTRAVENOUS ONCE
Status: COMPLETED | OUTPATIENT
Start: 2021-02-23 | End: 2021-02-23

## 2021-02-23 RX ADMIN — REGADENOSON 0.4 MG: 0.08 INJECTION, SOLUTION INTRAVENOUS at 01:02

## 2021-02-24 ENCOUNTER — TELEPHONE (OUTPATIENT)
Dept: CARDIOLOGY | Facility: CLINIC | Age: 81
End: 2021-02-24

## 2021-02-26 ENCOUNTER — TELEPHONE (OUTPATIENT)
Dept: CARDIOLOGY | Facility: CLINIC | Age: 81
End: 2021-02-26

## 2021-02-28 ENCOUNTER — EXTERNAL CHRONIC CARE MANAGEMENT (OUTPATIENT)
Dept: PRIMARY CARE CLINIC | Facility: CLINIC | Age: 81
End: 2021-02-28
Payer: MEDICARE

## 2021-02-28 PROCEDURE — 99490 CHRNC CARE MGMT STAFF 1ST 20: CPT | Mod: S$GLB,,, | Performed by: FAMILY MEDICINE

## 2021-02-28 PROCEDURE — 99490 PR CHRONIC CARE MGMT, 1ST 20 MIN: ICD-10-PCS | Mod: S$GLB,,, | Performed by: FAMILY MEDICINE

## 2021-03-01 ENCOUNTER — OFFICE VISIT (OUTPATIENT)
Dept: CARDIOLOGY | Facility: CLINIC | Age: 81
End: 2021-03-01
Payer: MEDICARE

## 2021-03-01 VITALS
BODY MASS INDEX: 33.04 KG/M2 | WEIGHT: 218 LBS | DIASTOLIC BLOOD PRESSURE: 66 MMHG | HEIGHT: 68 IN | HEART RATE: 65 BPM | OXYGEN SATURATION: 95 % | SYSTOLIC BLOOD PRESSURE: 126 MMHG

## 2021-03-01 DIAGNOSIS — I35.0 NONRHEUMATIC AORTIC VALVE STENOSIS: Primary | Chronic | ICD-10-CM

## 2021-03-01 DIAGNOSIS — I49.1 PAC (PREMATURE ATRIAL CONTRACTION): ICD-10-CM

## 2021-03-01 DIAGNOSIS — R01.1 CARDIAC MURMUR: ICD-10-CM

## 2021-03-01 DIAGNOSIS — I10 ESSENTIAL HYPERTENSION: ICD-10-CM

## 2021-03-01 DIAGNOSIS — I83.93 VARICOSE VEINS OF BOTH LOWER EXTREMITIES, UNSPECIFIED WHETHER COMPLICATED: ICD-10-CM

## 2021-03-01 DIAGNOSIS — I45.10 RBBB: ICD-10-CM

## 2021-03-01 DIAGNOSIS — I47.19 ATRIAL TACHYCARDIA: ICD-10-CM

## 2021-03-01 PROCEDURE — 99214 OFFICE O/P EST MOD 30 MIN: CPT | Mod: S$GLB,,, | Performed by: INTERNAL MEDICINE

## 2021-03-01 PROCEDURE — 99214 PR OFFICE/OUTPT VISIT, EST, LEVL IV, 30-39 MIN: ICD-10-PCS | Mod: S$GLB,,, | Performed by: INTERNAL MEDICINE

## 2021-03-01 RX ORDER — METOPROLOL SUCCINATE 25 MG/1
12.5 TABLET, EXTENDED RELEASE ORAL DAILY
Qty: 15 TABLET | Refills: 11 | Status: SHIPPED | OUTPATIENT
Start: 2021-03-01 | End: 2022-04-18 | Stop reason: SDUPTHER

## 2021-03-31 ENCOUNTER — EXTERNAL CHRONIC CARE MANAGEMENT (OUTPATIENT)
Dept: PRIMARY CARE CLINIC | Facility: CLINIC | Age: 81
End: 2021-03-31
Payer: MEDICARE

## 2021-03-31 PROCEDURE — 99490 CHRNC CARE MGMT STAFF 1ST 20: CPT | Mod: S$GLB,,, | Performed by: FAMILY MEDICINE

## 2021-03-31 PROCEDURE — 99490 PR CHRONIC CARE MGMT, 1ST 20 MIN: ICD-10-PCS | Mod: S$GLB,,, | Performed by: FAMILY MEDICINE

## 2021-04-01 ENCOUNTER — TELEPHONE (OUTPATIENT)
Dept: FAMILY MEDICINE | Facility: CLINIC | Age: 81
End: 2021-04-01

## 2021-04-01 RX ORDER — FUROSEMIDE 40 MG/1
40 TABLET ORAL DAILY
Qty: 30 TABLET | Refills: 2 | Status: SHIPPED | OUTPATIENT
Start: 2021-04-01 | End: 2022-03-29 | Stop reason: SDUPTHER

## 2021-04-19 ENCOUNTER — TELEPHONE (OUTPATIENT)
Dept: FAMILY MEDICINE | Facility: CLINIC | Age: 81
End: 2021-04-19

## 2021-04-30 ENCOUNTER — EXTERNAL CHRONIC CARE MANAGEMENT (OUTPATIENT)
Dept: PRIMARY CARE CLINIC | Facility: CLINIC | Age: 81
End: 2021-04-30
Payer: MEDICARE

## 2021-04-30 PROCEDURE — 99490 CHRNC CARE MGMT STAFF 1ST 20: CPT | Mod: S$GLB,,, | Performed by: FAMILY MEDICINE

## 2021-04-30 PROCEDURE — 99490 PR CHRONIC CARE MGMT, 1ST 20 MIN: ICD-10-PCS | Mod: S$GLB,,, | Performed by: FAMILY MEDICINE

## 2021-04-30 PROCEDURE — 99439 CHRNC CARE MGMT STAF EA ADDL: CPT | Mod: S$GLB,,, | Performed by: FAMILY MEDICINE

## 2021-04-30 PROCEDURE — 99439 PR CHRONIC CARE MGMT, EA ADDTL 20 MIN: ICD-10-PCS | Mod: S$GLB,,, | Performed by: FAMILY MEDICINE

## 2021-05-31 ENCOUNTER — EXTERNAL CHRONIC CARE MANAGEMENT (OUTPATIENT)
Dept: PRIMARY CARE CLINIC | Facility: CLINIC | Age: 81
End: 2021-05-31
Payer: MEDICARE

## 2021-05-31 PROCEDURE — 99490 PR CHRONIC CARE MGMT, 1ST 20 MIN: ICD-10-PCS | Mod: S$GLB,,, | Performed by: FAMILY MEDICINE

## 2021-05-31 PROCEDURE — 99490 CHRNC CARE MGMT STAFF 1ST 20: CPT | Mod: S$GLB,,, | Performed by: FAMILY MEDICINE

## 2021-06-26 ENCOUNTER — OUTSIDE PLACE OF SERVICE (OUTPATIENT)
Dept: CARDIOLOGY | Facility: CLINIC | Age: 81
End: 2021-06-26
Payer: MEDICARE

## 2021-06-26 PROCEDURE — 93010 ELECTROCARDIOGRAM REPORT: CPT | Mod: ,,, | Performed by: INTERNAL MEDICINE

## 2021-06-26 PROCEDURE — 93010 PR ELECTROCARDIOGRAM REPORT: ICD-10-PCS | Mod: ,,, | Performed by: INTERNAL MEDICINE

## 2021-06-27 ENCOUNTER — OUTSIDE PLACE OF SERVICE (OUTPATIENT)
Dept: CARDIOLOGY | Facility: CLINIC | Age: 81
End: 2021-06-27
Payer: MEDICARE

## 2021-06-27 PROCEDURE — 93010 PR ELECTROCARDIOGRAM REPORT: ICD-10-PCS | Mod: ,,, | Performed by: INTERNAL MEDICINE

## 2021-06-27 PROCEDURE — 93010 ELECTROCARDIOGRAM REPORT: CPT | Mod: ,,, | Performed by: INTERNAL MEDICINE

## 2021-06-28 ENCOUNTER — OFFICE VISIT (OUTPATIENT)
Dept: FAMILY MEDICINE | Facility: CLINIC | Age: 81
End: 2021-06-28
Payer: MEDICARE

## 2021-06-28 VITALS
BODY MASS INDEX: 32.94 KG/M2 | TEMPERATURE: 98 F | DIASTOLIC BLOOD PRESSURE: 62 MMHG | WEIGHT: 217.38 LBS | SYSTOLIC BLOOD PRESSURE: 110 MMHG | HEIGHT: 68 IN | HEART RATE: 63 BPM | OXYGEN SATURATION: 91 %

## 2021-06-28 DIAGNOSIS — I10 ESSENTIAL HYPERTENSION: Primary | ICD-10-CM

## 2021-06-28 DIAGNOSIS — R60.0 BILATERAL LEG EDEMA: ICD-10-CM

## 2021-06-28 DIAGNOSIS — M79.2 NEURALGIA: ICD-10-CM

## 2021-06-28 PROCEDURE — 99213 OFFICE O/P EST LOW 20 MIN: CPT | Mod: S$GLB,,, | Performed by: FAMILY MEDICINE

## 2021-06-28 PROCEDURE — 99213 PR OFFICE/OUTPT VISIT, EST, LEVL III, 20-29 MIN: ICD-10-PCS | Mod: S$GLB,,, | Performed by: FAMILY MEDICINE

## 2021-06-28 RX ORDER — GABAPENTIN 100 MG/1
CAPSULE ORAL
Qty: 90 CAPSULE | Refills: 1 | Status: SHIPPED | OUTPATIENT
Start: 2021-06-28 | End: 2021-09-27 | Stop reason: SDUPTHER

## 2021-06-30 ENCOUNTER — EXTERNAL CHRONIC CARE MANAGEMENT (OUTPATIENT)
Dept: PRIMARY CARE CLINIC | Facility: CLINIC | Age: 81
End: 2021-06-30
Payer: MEDICARE

## 2021-06-30 PROCEDURE — 99490 PR CHRONIC CARE MGMT, 1ST 20 MIN: ICD-10-PCS | Mod: S$GLB,,, | Performed by: FAMILY MEDICINE

## 2021-06-30 PROCEDURE — 99490 CHRNC CARE MGMT STAFF 1ST 20: CPT | Mod: S$GLB,,, | Performed by: FAMILY MEDICINE

## 2021-07-22 ENCOUNTER — TELEPHONE (OUTPATIENT)
Dept: FAMILY MEDICINE | Facility: CLINIC | Age: 81
End: 2021-07-22

## 2021-07-23 ENCOUNTER — OFFICE VISIT (OUTPATIENT)
Dept: FAMILY MEDICINE | Facility: CLINIC | Age: 81
End: 2021-07-23
Payer: MEDICARE

## 2021-07-23 ENCOUNTER — HOSPITAL ENCOUNTER (OUTPATIENT)
Dept: RADIOLOGY | Facility: HOSPITAL | Age: 81
Discharge: HOME | End: 2021-07-23
Attending: STUDENT IN AN ORGANIZED HEALTH CARE EDUCATION/TRAINING PROGRAM
Payer: MEDICARE

## 2021-07-23 ENCOUNTER — TELEPHONE (OUTPATIENT)
Dept: FAMILY MEDICINE | Facility: CLINIC | Age: 81
End: 2021-07-23

## 2021-07-23 VITALS
DIASTOLIC BLOOD PRESSURE: 70 MMHG | HEIGHT: 68 IN | SYSTOLIC BLOOD PRESSURE: 132 MMHG | BODY MASS INDEX: 32.96 KG/M2 | OXYGEN SATURATION: 95 % | WEIGHT: 217.5 LBS | TEMPERATURE: 98 F | HEART RATE: 71 BPM

## 2021-07-23 DIAGNOSIS — M25.552 HIP PAIN, LEFT: Primary | ICD-10-CM

## 2021-07-23 DIAGNOSIS — M25.552 HIP PAIN, LEFT: ICD-10-CM

## 2021-07-23 DIAGNOSIS — T14.8XXA MUSCLE STRAIN: ICD-10-CM

## 2021-07-23 DIAGNOSIS — Z79.52 CURRENT CHRONIC USE OF SYSTEMIC STEROIDS: ICD-10-CM

## 2021-07-23 PROCEDURE — 73502 X-RAY EXAM HIP UNI 2-3 VIEWS: CPT | Mod: TC,FY,PO,LT

## 2021-07-23 PROCEDURE — 99214 PR OFFICE/OUTPT VISIT, EST, LEVL IV, 30-39 MIN: ICD-10-PCS | Mod: S$GLB,,, | Performed by: STUDENT IN AN ORGANIZED HEALTH CARE EDUCATION/TRAINING PROGRAM

## 2021-07-23 PROCEDURE — 99214 OFFICE O/P EST MOD 30 MIN: CPT | Mod: S$GLB,,, | Performed by: STUDENT IN AN ORGANIZED HEALTH CARE EDUCATION/TRAINING PROGRAM

## 2021-07-23 RX ORDER — PREDNISONE 5 MG/1
5 TABLET ORAL 2 TIMES DAILY
Qty: 180 TABLET | Refills: 1 | Status: SHIPPED | OUTPATIENT
Start: 2021-07-23 | End: 2022-03-04 | Stop reason: SDUPTHER

## 2021-07-23 RX ORDER — TIZANIDINE 4 MG/1
4 TABLET ORAL EVERY 8 HOURS PRN
Qty: 30 TABLET | Refills: 0 | Status: SHIPPED | OUTPATIENT
Start: 2021-07-23 | End: 2021-08-02

## 2021-07-31 ENCOUNTER — EXTERNAL CHRONIC CARE MANAGEMENT (OUTPATIENT)
Dept: PRIMARY CARE CLINIC | Facility: CLINIC | Age: 81
End: 2021-07-31
Payer: MEDICARE

## 2021-07-31 PROCEDURE — 99490 PR CHRONIC CARE MGMT, 1ST 20 MIN: ICD-10-PCS | Mod: S$GLB,,, | Performed by: FAMILY MEDICINE

## 2021-07-31 PROCEDURE — 99490 CHRNC CARE MGMT STAFF 1ST 20: CPT | Mod: S$GLB,,, | Performed by: FAMILY MEDICINE

## 2021-08-03 LAB
BUN SERPL-MCNC: 27 MG/DL (ref 7–25)
BUN/CREAT SERPL: 24 (CALC) (ref 6–22)
CALCIUM SERPL-MCNC: 9.3 MG/DL (ref 8.6–10.3)
CHLORIDE SERPL-SCNC: 103 MMOL/L (ref 98–110)
CO2 SERPL-SCNC: 27 MMOL/L (ref 20–32)
CREAT SERPL-MCNC: 1.13 MG/DL (ref 0.7–1.11)
GLUCOSE SERPL-MCNC: 85 MG/DL (ref 65–99)
POTASSIUM SERPL-SCNC: 3.7 MMOL/L (ref 3.5–5.3)
SODIUM SERPL-SCNC: 142 MMOL/L (ref 135–146)

## 2021-08-04 ENCOUNTER — TELEPHONE (OUTPATIENT)
Dept: FAMILY MEDICINE | Facility: CLINIC | Age: 81
End: 2021-08-04

## 2021-08-16 RX ORDER — LISINOPRIL AND HYDROCHLOROTHIAZIDE 10; 12.5 MG/1; MG/1
1 TABLET ORAL DAILY
Qty: 90 TABLET | Refills: 3 | Status: SHIPPED | OUTPATIENT
Start: 2021-08-16 | End: 2022-09-06

## 2021-08-31 ENCOUNTER — EXTERNAL CHRONIC CARE MANAGEMENT (OUTPATIENT)
Dept: PRIMARY CARE CLINIC | Facility: CLINIC | Age: 81
End: 2021-08-31
Payer: MEDICARE

## 2021-08-31 PROCEDURE — 99490 CHRNC CARE MGMT STAFF 1ST 20: CPT | Mod: S$GLB,,, | Performed by: FAMILY MEDICINE

## 2021-08-31 PROCEDURE — 99490 PR CHRONIC CARE MGMT, 1ST 20 MIN: ICD-10-PCS | Mod: S$GLB,,, | Performed by: FAMILY MEDICINE

## 2021-09-27 ENCOUNTER — TELEPHONE (OUTPATIENT)
Dept: FAMILY MEDICINE | Facility: CLINIC | Age: 81
End: 2021-09-27

## 2021-09-27 RX ORDER — GABAPENTIN 100 MG/1
CAPSULE ORAL
Qty: 270 CAPSULE | Refills: 3 | Status: SHIPPED | OUTPATIENT
Start: 2021-09-27 | End: 2022-11-10 | Stop reason: SDUPTHER

## 2021-09-30 ENCOUNTER — EXTERNAL CHRONIC CARE MANAGEMENT (OUTPATIENT)
Dept: PRIMARY CARE CLINIC | Facility: CLINIC | Age: 81
End: 2021-09-30
Payer: MEDICARE

## 2021-09-30 PROCEDURE — 99490 CHRNC CARE MGMT STAFF 1ST 20: CPT | Mod: S$GLB,,, | Performed by: FAMILY MEDICINE

## 2021-09-30 PROCEDURE — 99490 PR CHRONIC CARE MGMT, 1ST 20 MIN: ICD-10-PCS | Mod: S$GLB,,, | Performed by: FAMILY MEDICINE

## 2021-10-07 ENCOUNTER — PES CALL (OUTPATIENT)
Dept: ADMINISTRATIVE | Facility: CLINIC | Age: 81
End: 2021-10-07

## 2021-10-31 ENCOUNTER — EXTERNAL CHRONIC CARE MANAGEMENT (OUTPATIENT)
Dept: PRIMARY CARE CLINIC | Facility: CLINIC | Age: 81
End: 2021-10-31
Payer: MEDICARE

## 2021-10-31 PROCEDURE — 99490 CHRNC CARE MGMT STAFF 1ST 20: CPT | Mod: S$GLB,,, | Performed by: FAMILY MEDICINE

## 2021-10-31 PROCEDURE — 99490 PR CHRONIC CARE MGMT, 1ST 20 MIN: ICD-10-PCS | Mod: S$GLB,,, | Performed by: FAMILY MEDICINE

## 2021-11-30 ENCOUNTER — EXTERNAL CHRONIC CARE MANAGEMENT (OUTPATIENT)
Dept: PRIMARY CARE CLINIC | Facility: CLINIC | Age: 81
End: 2021-11-30
Payer: MEDICARE

## 2021-11-30 PROCEDURE — 99490 PR CHRONIC CARE MGMT, 1ST 20 MIN: ICD-10-PCS | Mod: S$GLB,,, | Performed by: FAMILY MEDICINE

## 2021-11-30 PROCEDURE — 99490 CHRNC CARE MGMT STAFF 1ST 20: CPT | Mod: S$GLB,,, | Performed by: FAMILY MEDICINE

## 2021-12-06 ENCOUNTER — OFFICE VISIT (OUTPATIENT)
Dept: FAMILY MEDICINE | Facility: CLINIC | Age: 81
End: 2021-12-06
Payer: MEDICARE

## 2021-12-06 ENCOUNTER — TELEPHONE (OUTPATIENT)
Dept: FAMILY MEDICINE | Facility: CLINIC | Age: 81
End: 2021-12-06
Payer: MEDICARE

## 2021-12-06 VITALS
HEIGHT: 68 IN | WEIGHT: 204.25 LBS | TEMPERATURE: 98 F | BODY MASS INDEX: 30.96 KG/M2 | OXYGEN SATURATION: 95 % | HEART RATE: 68 BPM | DIASTOLIC BLOOD PRESSURE: 84 MMHG | SYSTOLIC BLOOD PRESSURE: 134 MMHG

## 2021-12-06 DIAGNOSIS — S39.012A STRAIN OF MUSCLE, FASCIA AND TENDON OF LOWER BACK, INITIAL ENCOUNTER: Primary | ICD-10-CM

## 2021-12-06 PROCEDURE — 99214 PR OFFICE/OUTPT VISIT, EST, LEVL IV, 30-39 MIN: ICD-10-PCS | Mod: S$GLB,,, | Performed by: STUDENT IN AN ORGANIZED HEALTH CARE EDUCATION/TRAINING PROGRAM

## 2021-12-06 PROCEDURE — 99214 OFFICE O/P EST MOD 30 MIN: CPT | Mod: S$GLB,,, | Performed by: STUDENT IN AN ORGANIZED HEALTH CARE EDUCATION/TRAINING PROGRAM

## 2021-12-06 RX ORDER — TIZANIDINE 4 MG/1
4 TABLET ORAL EVERY 6 HOURS PRN
Qty: 40 TABLET | Refills: 0 | Status: SHIPPED | OUTPATIENT
Start: 2021-12-06 | End: 2021-12-16

## 2021-12-06 RX ORDER — METHYLPREDNISOLONE 4 MG/1
TABLET ORAL
Qty: 21 EACH | Refills: 0 | Status: SHIPPED | OUTPATIENT
Start: 2021-12-06 | End: 2021-12-27

## 2021-12-31 ENCOUNTER — EXTERNAL CHRONIC CARE MANAGEMENT (OUTPATIENT)
Dept: PRIMARY CARE CLINIC | Facility: CLINIC | Age: 81
End: 2021-12-31
Payer: MEDICARE

## 2021-12-31 PROCEDURE — 99490 PR CHRONIC CARE MGMT, 1ST 20 MIN: ICD-10-PCS | Mod: S$GLB,,, | Performed by: FAMILY MEDICINE

## 2021-12-31 PROCEDURE — 99490 CHRNC CARE MGMT STAFF 1ST 20: CPT | Mod: S$GLB,,, | Performed by: FAMILY MEDICINE

## 2022-01-26 ENCOUNTER — TELEPHONE (OUTPATIENT)
Dept: FAMILY MEDICINE | Facility: CLINIC | Age: 82
End: 2022-01-26
Payer: MEDICARE

## 2022-01-26 DIAGNOSIS — R21 RASH: Primary | ICD-10-CM

## 2022-01-26 NOTE — TELEPHONE ENCOUNTER
Rash started 2 years ago  That comes and goes  During the winter    He has tried otc creams but he states then the itching occurs in other locations  He thinks there is something going on with his blood  He scratches it until it bleeds    I advised skin is dry from the winter air  And to lather with lotion bid but he says that will not help  Itching under skin  No visible rash     He declines appt tomorrow bc he has to take his son to chemo    No other available appts with any doc this week    Please advise

## 2022-01-26 NOTE — TELEPHONE ENCOUNTER
----- Message from Elsy Patel sent at 1/26/2022 10:05 AM CST -----  Contact: [phone]/ self  Type:  Same Day Appointment Request    Caller is requesting a same day appointment.  Caller declined first available appointment listed below.    Name of Caller: pt   When is the first available appointment? 02/10  Symptoms: rash all over body   Best Call Back Number: [phone]  Additional Information:

## 2022-01-31 ENCOUNTER — EXTERNAL CHRONIC CARE MANAGEMENT (OUTPATIENT)
Dept: PRIMARY CARE CLINIC | Facility: CLINIC | Age: 82
End: 2022-01-31
Payer: MEDICARE

## 2022-01-31 PROCEDURE — 99490 PR CHRONIC CARE MGMT, 1ST 20 MIN: ICD-10-PCS | Mod: S$GLB,,, | Performed by: FAMILY MEDICINE

## 2022-01-31 PROCEDURE — 99490 CHRNC CARE MGMT STAFF 1ST 20: CPT | Mod: S$GLB,,, | Performed by: FAMILY MEDICINE

## 2022-02-14 ENCOUNTER — TELEPHONE (OUTPATIENT)
Dept: FAMILY MEDICINE | Facility: CLINIC | Age: 82
End: 2022-02-14
Payer: MEDICARE

## 2022-02-14 ENCOUNTER — OFFICE VISIT (OUTPATIENT)
Dept: FAMILY MEDICINE | Facility: CLINIC | Age: 82
End: 2022-02-14
Payer: MEDICARE

## 2022-02-14 VITALS
HEART RATE: 53 BPM | DIASTOLIC BLOOD PRESSURE: 62 MMHG | TEMPERATURE: 99 F | WEIGHT: 201.63 LBS | BODY MASS INDEX: 30.56 KG/M2 | OXYGEN SATURATION: 96 % | HEIGHT: 68 IN | SYSTOLIC BLOOD PRESSURE: 126 MMHG

## 2022-02-14 DIAGNOSIS — R39.9 UTI SYMPTOMS: ICD-10-CM

## 2022-02-14 DIAGNOSIS — M54.50 BILATERAL LOW BACK PAIN WITHOUT SCIATICA, UNSPECIFIED CHRONICITY: Primary | ICD-10-CM

## 2022-02-14 LAB
BILIRUB SERPL-MCNC: ABNORMAL MG/DL
BLOOD URINE, POC: ABNORMAL
CLARITY, POC UA: CLEAR
COLOR, POC UA: ABNORMAL
GLUCOSE UR QL STRIP: ABNORMAL
KETONES UR QL STRIP: ABNORMAL
LEUKOCYTE ESTERASE URINE, POC: ABNORMAL
NITRITE, POC UA: ABNORMAL
PH, POC UA: 7
PROTEIN, POC: ABNORMAL
SPECIFIC GRAVITY, POC UA: 1.01
UROBILINOGEN, POC UA: 1

## 2022-02-14 PROCEDURE — 81002 POCT URINE DIPSTICK WITHOUT MICROSCOPE: ICD-10-PCS | Mod: S$GLB,,, | Performed by: FAMILY MEDICINE

## 2022-02-14 PROCEDURE — 99213 OFFICE O/P EST LOW 20 MIN: CPT | Mod: S$GLB,,, | Performed by: FAMILY MEDICINE

## 2022-02-14 PROCEDURE — 81002 URINALYSIS NONAUTO W/O SCOPE: CPT | Mod: S$GLB,,, | Performed by: FAMILY MEDICINE

## 2022-02-14 PROCEDURE — 99213 PR OFFICE/OUTPT VISIT, EST, LEVL III, 20-29 MIN: ICD-10-PCS | Mod: S$GLB,,, | Performed by: FAMILY MEDICINE

## 2022-02-14 NOTE — TELEPHONE ENCOUNTER
Spoke to pt.      ----- Message from Fabiana Romero sent at 2/14/2022  8:41 AM CST -----  Type:  Same Day Appointment Request    Caller is requesting a same day appointment.  Caller declined first available appointment listed below.    Name of Caller: pt  When is the first available appointment? 2/14/2022  Symptoms: Kidney infection   Best Call Back Number: [phone]  Additional Information:  pt wants to see Dr. Jarvis or Dr. Rey

## 2022-02-21 ENCOUNTER — TELEPHONE (OUTPATIENT)
Dept: ADMINISTRATIVE | Facility: OTHER | Age: 82
End: 2022-02-21
Payer: MEDICARE

## 2022-02-21 ENCOUNTER — TELEPHONE (OUTPATIENT)
Dept: FAMILY MEDICINE | Facility: CLINIC | Age: 82
End: 2022-02-21
Payer: MEDICARE

## 2022-02-21 ENCOUNTER — LAB VISIT (OUTPATIENT)
Dept: LAB | Facility: HOSPITAL | Age: 82
End: 2022-02-21
Attending: FAMILY MEDICINE
Payer: MEDICARE

## 2022-02-21 DIAGNOSIS — S39.012A STRAIN OF MUSCLE, FASCIA AND TENDON OF LOWER BACK, INITIAL ENCOUNTER: ICD-10-CM

## 2022-02-21 DIAGNOSIS — R10.30 LOWER ABDOMINAL PAIN: ICD-10-CM

## 2022-02-21 DIAGNOSIS — R39.9 UTI SYMPTOMS: ICD-10-CM

## 2022-02-21 DIAGNOSIS — I10 ESSENTIAL HYPERTENSION: ICD-10-CM

## 2022-02-21 DIAGNOSIS — R39.9 UTI SYMPTOMS: Primary | ICD-10-CM

## 2022-02-21 DIAGNOSIS — R10.9 FLANK PAIN: ICD-10-CM

## 2022-02-21 DIAGNOSIS — N22 CALCULUS OF URINARY TRACT IN DISEASES CLASSIFIED ELSEWHERE: ICD-10-CM

## 2022-02-21 DIAGNOSIS — R10.9 FLANK PAIN: Primary | ICD-10-CM

## 2022-02-21 LAB
BILIRUB UR QL STRIP: NEGATIVE
CLARITY UR REFRACT.AUTO: CLEAR
COLOR UR AUTO: ABNORMAL
GLUCOSE UR QL STRIP: NEGATIVE
HGB UR QL STRIP: NEGATIVE
KETONES UR QL STRIP: NEGATIVE
LEUKOCYTE ESTERASE UR QL STRIP: ABNORMAL
MICROSCOPIC COMMENT: NORMAL
NITRITE UR QL STRIP: NEGATIVE
PH UR STRIP: 7 [PH] (ref 5–8)
PROT UR QL STRIP: ABNORMAL
SP GR UR STRIP: 1.01 (ref 1–1.03)
URN SPEC COLLECT METH UR: ABNORMAL
UROBILINOGEN UR STRIP-ACNC: 1 EU/DL
WBC #/AREA URNS AUTO: 5 /HPF (ref 0–5)

## 2022-02-21 PROCEDURE — 81000 URINALYSIS NONAUTO W/SCOPE: CPT | Mod: PO | Performed by: FAMILY MEDICINE

## 2022-02-21 NOTE — TELEPHONE ENCOUNTER
----- Message from Aurora Aguila sent at 2/21/2022  8:24 AM CST -----  Type: Requesting to speak with nurse         Who Called: PT  Regarding: Pt says its not a pulled and he needs to see dr today and get some test done- he thinks it has something to do with his kidneys please advise   Would the patient rather a call back or a response via [facility]? Call back  Best Call Back Number: [phone]  Additional Information: n/a

## 2022-02-21 NOTE — PROGRESS NOTES
" Patient ID: Teo Gloria is a 81 y.o. male.    Chief Complaint: Low-back Pain and Urinary Tract Infection    HPI       Teo Gloria is a 81 y.o. male here with complaints of lower back pain lateral aspect of.  With radiation inferiorly.  Concerned about it being a urinary tract infection and the pain being from his kidneys.  No fever chills.  No change in color or frequency order urine.  The lower back pain changes with position.      Review of Symptoms      /62   Pulse (!) 53   Temp 98.5 °F (36.9 °C) (Oral)   Ht 5' 8" (1.727 m)   Wt 91.4 kg (201 lb 9.8 oz)   SpO2 96%   BMI 30.65 kg/m²     Physical Exam    Vitals:    02/14/22 1616 02/14/22 1642   BP: (!) 142/62 126/62   BP Location: Left arm    Patient Position: Sitting    Pulse: (!) 53    Temp: 98.5 °F (36.9 °C)    TempSrc: Oral    SpO2: 96%    Weight: 91.4 kg (201 lb 9.8 oz)    Height: 5' 8" (1.727 m)        Constitutional:   Oriented to person, place, and time.appears well-developed and well-nourished.   No distress.      HENT  Head: Normocephalic and atraumatic  Right Ear: External ear normal.   Left Ear: External ear normal.   Nose: External nose normal.   Mouth: Moist mucous membranes    Eyes:   Conjunctivae are normal. Right eye exhibits no discharge. Left eye exhibits no discharge. No scleral icterus. No periorbital edema    Musculoskeletal:  No edema. No obvious deformity No wasting     Neurological:  Alert and oriented to person, place, and time. Coordination normal.     Skin:   Skin is warm and dry.  No diaphoresis.   No rash noted.     Psychiatric: Normal mood and affect. Behavior is normal. Judgment and thought content normal.     Complete Blood Count  Lab Results   Component Value Date    RBC 4.19 (L) 08/10/2020    HGB 12.7 (L) 08/10/2020    HCT 38.7 08/10/2020    MCV 92.4 08/10/2020    MCH 30.3 08/10/2020    MCHC 32.8 08/10/2020    RDW 12.5 08/10/2020     08/10/2020    MPV 11.4 08/10/2020    GRAN 5.4 09/18/2017    GRAN 61.7 " 09/18/2017    LYMPH 2,247 08/10/2020    LYMPH 27.4 08/10/2020    MONO 845 08/10/2020    MONO 10.3 08/10/2020     08/10/2020    BASO 82 08/10/2020    EOSINOPHIL 3.3 08/10/2020    BASOPHIL 1.0 08/10/2020    DIFFMETHOD Automated 09/18/2017       Comprehensive Metabolic Panel  No results found for: GLU, BUN, CREATININE, NA, K, CL, PROT, ALBUMIN, BILITOT, AST, ALKPHOS, CO2, ALT, ANIONGAP, EGFRNONAA, ESTGFRAFRICA    TSH  No results found for: TSH    Assessment / Plan:      ICD-10-CM ICD-9-CM   1. Bilateral low back pain without sciatica, unspecified chronicity  M54.50 724.2   2. UTI symptoms  R39.9 788.99     Bilateral low back pain without sciatica, unspecified chronicity    UTI symptoms  -     POCT URINE DIPSTICK WITHOUT MICROSCOPE

## 2022-02-22 ENCOUNTER — TELEPHONE (OUTPATIENT)
Dept: FAMILY MEDICINE | Facility: CLINIC | Age: 82
End: 2022-02-22
Payer: MEDICARE

## 2022-02-24 ENCOUNTER — TELEPHONE (OUTPATIENT)
Dept: FAMILY MEDICINE | Facility: CLINIC | Age: 82
End: 2022-02-24
Payer: MEDICARE

## 2022-02-24 RX ORDER — HYDROCODONE BITARTRATE AND ACETAMINOPHEN 5; 325 MG/1; MG/1
1 TABLET ORAL EVERY 6 HOURS PRN
Qty: 20 TABLET | Refills: 0 | Status: SHIPPED | OUTPATIENT
Start: 2022-02-24 | End: 2022-09-06

## 2022-02-26 RX ORDER — TIZANIDINE 2 MG/1
2 TABLET ORAL NIGHTLY
Qty: 15 TABLET | Refills: 1 | Status: SHIPPED | OUTPATIENT
Start: 2022-02-26 | End: 2022-03-29 | Stop reason: SDUPTHER

## 2022-02-26 NOTE — PROGRESS NOTES
Please place on the schedule Wednesday afternoon this week.  4:00 p.m. he is leaving his wife for a little while so I told him to go to the back door will check him in there and I can see him between patients and the him go home quickly

## 2022-02-28 ENCOUNTER — EXTERNAL CHRONIC CARE MANAGEMENT (OUTPATIENT)
Dept: PRIMARY CARE CLINIC | Facility: CLINIC | Age: 82
End: 2022-02-28
Payer: MEDICARE

## 2022-02-28 PROCEDURE — 99490 PR CHRONIC CARE MGMT, 1ST 20 MIN: ICD-10-PCS | Mod: S$GLB,,, | Performed by: FAMILY MEDICINE

## 2022-02-28 PROCEDURE — 99490 CHRNC CARE MGMT STAFF 1ST 20: CPT | Mod: S$GLB,,, | Performed by: FAMILY MEDICINE

## 2022-03-02 ENCOUNTER — OFFICE VISIT (OUTPATIENT)
Dept: FAMILY MEDICINE | Facility: CLINIC | Age: 82
End: 2022-03-02
Payer: MEDICARE

## 2022-03-02 DIAGNOSIS — S39.012A STRAIN OF MUSCLE, FASCIA AND TENDON OF LOWER BACK, INITIAL ENCOUNTER: Primary | ICD-10-CM

## 2022-03-02 PROCEDURE — 99213 OFFICE O/P EST LOW 20 MIN: CPT | Mod: S$GLB,,, | Performed by: FAMILY MEDICINE

## 2022-03-02 PROCEDURE — 99213 PR OFFICE/OUTPT VISIT, EST, LEVL III, 20-29 MIN: ICD-10-PCS | Mod: S$GLB,,, | Performed by: FAMILY MEDICINE

## 2022-03-02 NOTE — PROGRESS NOTES
Patient ID: Teo Gloria is a 81 y.o. male.    Chief Complaint: Back Pain    HPI       Teo Gloria is a 81 y.o. male here with continued back pain.  Patient with lower back pain especially in the middle night where he wakes up and finds it is excruciating.  He gets up to walk around and often times relieve is a cheese.  Recently antibiotics and pain medication given some relief but not full.  No complaints of fever chills.  No urinary complaints.  Recent CT scan done at [facility] negative for intra-abdominal or retroperitoneal problems.      Review of Symptoms      There were no vitals taken for this visit.    Physical Exam    There were no vitals filed for this visit.    Constitutional:   Oriented to person, place, and time.appears well-developed and well-nourished.   No distress.      HENT  Head: Normocephalic and atraumatic  Right Ear: External ear normal.   Left Ear: External ear normal.   Nose: External nose normal.   Mouth: Moist mucous membranes    Eyes:   Conjunctivae are normal. Right eye exhibits no discharge. Left eye exhibits no discharge. No scleral icterus. No periorbital edema    Musculoskeletal:  No edema. No obvious deformity No wasting   No tenderness to lower back-points to the paraspinous muscles  Bilaterally.  Neurological:  Alert and oriented to person, place, and time. Coordination normal.     Skin:   Skin is warm and dry.  No diaphoresis.   No rash noted.     Psychiatric: Normal mood and affect. Behavior is normal. Judgment and thought content normal.     Complete Blood Count  Lab Results   Component Value Date    RBC 3.93 (L) 02/21/2022    HGB 11.7 (L) 02/21/2022    HCT 36.4 (L) 02/21/2022    MCV 93 02/21/2022    MCH 29.8 02/21/2022    MCHC 32.1 02/21/2022    RDW 12.7 02/21/2022     02/21/2022    MPV 11.2 02/21/2022    GRAN 5.5 02/21/2022    GRAN 76.9 (H) 02/21/2022    LYMPH 1.1 02/21/2022    LYMPH 15.8 (L) 02/21/2022    MONO 0.3 02/21/2022    MONO 4.6  02/21/2022    EOS 0.0 02/21/2022    BASO 0.08 02/21/2022    EOSINOPHIL 0.6 02/21/2022    BASOPHIL 1.1 02/21/2022    DIFFMETHOD Automated 02/21/2022       Comprehensive Metabolic Panel  Lab Results   Component Value Date     (H) 02/21/2022    BUN 29 (H) 02/21/2022    CREATININE 1.11 02/21/2022     02/21/2022    K 3.6 02/21/2022     02/21/2022    PROT 6.7 02/21/2022    ALBUMIN 3.9 02/21/2022    BILITOT 1.0 02/21/2022    AST 27 02/21/2022    ALKPHOS 40 02/21/2022    CO2 30 (H) 02/21/2022    ALT 16 02/21/2022    ANIONGAP 8 02/21/2022    EGFRNONAA >60.0 02/21/2022    ESTGFRAFRICA >60.0 02/21/2022       TSH  No results found for: TSH    Assessment / Plan:      ICD-10-CM ICD-9-CM   1. Strain of muscle, fascia and tendon of lower back, initial encounter  S39.012A 846.9     Strain of muscle, fascia and tendon of lower back, initial encounter      She did ice and says pain medication when needed-physical therapy pain management if needed

## 2022-03-04 ENCOUNTER — TELEPHONE (OUTPATIENT)
Dept: FAMILY MEDICINE | Facility: CLINIC | Age: 82
End: 2022-03-04
Payer: MEDICARE

## 2022-03-04 RX ORDER — PREDNISONE 5 MG/1
5 TABLET ORAL 2 TIMES DAILY
Qty: 180 TABLET | Refills: 0 | Status: SHIPPED | OUTPATIENT
Start: 2022-03-04 | End: 2022-07-01 | Stop reason: SDUPTHER

## 2022-03-04 NOTE — TELEPHONE ENCOUNTER
Alverto Truong Staff  Phone Number: [phone]     MRN: [id number]   PT: Teo Gloria   : 1940   Pharmacy: [facility],  [phone]   [facility] [address]   PCP: Dr. Fredis Hector     I spoke with pt this morning and he is in need of a refill of his Prednisone 5mg taking BID to [facility] in [place], [place].   If needed, you may reach me via [employer] or at the number listed below.     Thank you   Alverto Diaz LPN   Care Junior/Care Coordinator   [phone] ext. [phone]

## 2022-03-29 ENCOUNTER — TELEPHONE (OUTPATIENT)
Dept: FAMILY MEDICINE | Facility: CLINIC | Age: 82
End: 2022-03-29
Payer: MEDICARE

## 2022-03-29 ENCOUNTER — OFFICE VISIT (OUTPATIENT)
Dept: FAMILY MEDICINE | Facility: CLINIC | Age: 82
End: 2022-03-29
Payer: MEDICARE

## 2022-03-29 VITALS
BODY MASS INDEX: 30.52 KG/M2 | TEMPERATURE: 98 F | WEIGHT: 200.75 LBS | OXYGEN SATURATION: 98 % | SYSTOLIC BLOOD PRESSURE: 122 MMHG | HEART RATE: 66 BPM | DIASTOLIC BLOOD PRESSURE: 60 MMHG

## 2022-03-29 DIAGNOSIS — I35.0 NONRHEUMATIC AORTIC VALVE STENOSIS: Chronic | ICD-10-CM

## 2022-03-29 DIAGNOSIS — I10 ESSENTIAL HYPERTENSION: Primary | ICD-10-CM

## 2022-03-29 DIAGNOSIS — M54.16 LUMBAR RADICULOPATHY, CHRONIC: ICD-10-CM

## 2022-03-29 PROCEDURE — 99213 OFFICE O/P EST LOW 20 MIN: CPT | Mod: S$GLB,,, | Performed by: FAMILY MEDICINE

## 2022-03-29 PROCEDURE — 99213 PR OFFICE/OUTPT VISIT, EST, LEVL III, 20-29 MIN: ICD-10-PCS | Mod: S$GLB,,, | Performed by: FAMILY MEDICINE

## 2022-03-29 RX ORDER — DULOXETIN HYDROCHLORIDE 30 MG/1
30 CAPSULE, DELAYED RELEASE ORAL DAILY
Qty: 30 CAPSULE | Refills: 1 | Status: SHIPPED | OUTPATIENT
Start: 2022-03-29 | End: 2022-09-06

## 2022-03-29 RX ORDER — TIZANIDINE 2 MG/1
2 TABLET ORAL NIGHTLY
Qty: 45 TABLET | Refills: 1 | Status: SHIPPED | OUTPATIENT
Start: 2022-03-29 | End: 2022-05-04

## 2022-03-29 RX ORDER — FUROSEMIDE 20 MG/1
20 TABLET ORAL DAILY
Qty: 30 TABLET | Refills: 2 | Status: SHIPPED | OUTPATIENT
Start: 2022-03-29 | End: 2022-07-15 | Stop reason: SDUPTHER

## 2022-03-29 NOTE — PROGRESS NOTES
Patient ID: Teo Gloria is a 82 y.o. male.    Chief Complaint: Back Pain    HPI       Teo Gloria is a 82 y.o. male complains of continued back pain.  Pain is along the flank.  Can be bilaterally.  Often wakes him up at 2:00 a.m. in the morning.  Multiple medications have not really helped.  Including pain medication.  Also physical therapy has not helped.  Patient is suffering significantly with this pain.  No loss of urine or bowel however pain continues despite conservative treatment.      Review of Symptoms      /60   Pulse 66   Temp 97.9 °F (36.6 °C)   Wt 91 kg (200 lb 11.7 oz)   SpO2 98%   BMI 30.52 kg/m²     Physical Exam    Vitals:    03/29/22 1539   BP: 122/60   Pulse: 66   Temp: 97.9 °F (36.6 °C)   SpO2: 98%   Weight: 91 kg (200 lb 11.7 oz)       Constitutional:   Oriented to person, place, and time.appears well-developed and well-nourished.   No distress.      HENT  Head: Normocephalic and atraumatic  Right Ear: External ear normal.   Left Ear: External ear normal.   Nose: External nose normal.   Mouth: Moist mucous membranes    Eyes:   Conjunctivae are normal. Right eye exhibits no discharge. Left eye exhibits no discharge. No scleral icterus. No periorbital edema    Musculoskeletal:  No edema. No obvious deformity No wasting     Neurological:  Alert and oriented to person, place, and time. Coordination normal.     Skin:   Skin is warm and dry.  No diaphoresis.   No rash noted.     Psychiatric: Normal mood and affect. Behavior is normal. Judgment and thought content normal.     Complete Blood Count  Lab Results   Component Value Date    RBC 3.93 (L) 02/21/2022    HGB 11.7 (L) 02/21/2022    HCT 36.4 (L) 02/21/2022    MCV 93 02/21/2022    MCH 29.8 02/21/2022    MCHC 32.1 02/21/2022    RDW 12.7 02/21/2022     02/21/2022    MPV 11.2 02/21/2022    GRAN 5.5 02/21/2022    GRAN 76.9 (H) 02/21/2022    LYMPH 1.1 02/21/2022    LYMPH 15.8 (L) 02/21/2022    MONO 0.3 02/21/2022    MONO 4.6  02/21/2022    EOS 0.0 02/21/2022    BASO 0.08 02/21/2022    EOSINOPHIL 0.6 02/21/2022    BASOPHIL 1.1 02/21/2022    DIFFMETHOD Automated 02/21/2022       Comprehensive Metabolic Panel  Lab Results   Component Value Date     (H) 02/21/2022    BUN 29 (H) 02/21/2022    CREATININE 1.11 02/21/2022     02/21/2022    K 3.6 02/21/2022     02/21/2022    PROT 6.7 02/21/2022    ALBUMIN 3.9 02/21/2022    BILITOT 1.0 02/21/2022    AST 27 02/21/2022    ALKPHOS 40 02/21/2022    CO2 30 (H) 02/21/2022    ALT 16 02/21/2022    ANIONGAP 8 02/21/2022    EGFRNONAA >60.0 02/21/2022    ESTGFRAFRICA >60.0 02/21/2022       TSH  No results found for: TSH    Assessment / Plan:      ICD-10-CM ICD-9-CM   1. Essential hypertension  I10 401.9   2. Lumbar radiculopathy, chronic  M54.16 724.4   3. Nonrheumatic aortic valve stenosis  I35.0 424.1     Essential hypertension    Lumbar radiculopathy, chronic  -     MRI Lumbar Spine Without Contrast; Future; Expected date: 03/29/2022    Nonrheumatic aortic valve stenosis    Other orders  -     furosemide (LASIX) 20 MG tablet; Take 1 tablet (20 mg total) by mouth once daily. For 7 day sand may repeat  Dispense: 30 tablet; Refill: 2  -     tiZANidine (ZANAFLEX) 2 MG tablet; Take 1 tablet (2 mg total) by mouth every evening. For muscle spasm - DO NOT TAKE WITH PAIN MEDICATION  Dispense: 45 tablet; Refill: 1  -     DULoxetine (CYMBALTA) 30 MG capsule; Take 1 capsule (30 mg total) by mouth once daily. For nerve pain  Dispense: 30 capsule; Refill: 1      Patient with mild depressed mood because of taking care of his wife with this back pain is difficult.

## 2022-03-29 NOTE — TELEPHONE ENCOUNTER
Same Day Access Requested  Brandi TELLES [facility] Staff  Caller: [phone] (Today, 10:28 AM)  Patient is requesting a call back regarding scheduling an ED follow up to discuss him going to a neurologist.   Would the patient rather a call back or a response via [facility]?  call   Best Call Back Number: [phone]   Additional Information:   today or tomorrow if possible

## 2022-03-29 NOTE — TELEPHONE ENCOUNTER
ROJELIO    ----- Message from Sharron Moe sent at 3/29/2022  8:44 AM CDT -----  Contact: pt  Type:  Needs Medical Advice    Who Called: pt  Symptoms (please be specific):    How long has patient had these symptoms:    Pharmacy name and phone #:    Would the patient rather a call back or a response via [facility]? call  Best Call Back Number: [phone]  Additional Information:     pt is on his way to the ER said he can't take the pain he has been having for 2 months anymore.       I attempted to call him back but no answer - I LM for him to rtn call

## 2022-03-31 ENCOUNTER — EXTERNAL CHRONIC CARE MANAGEMENT (OUTPATIENT)
Dept: PRIMARY CARE CLINIC | Facility: CLINIC | Age: 82
End: 2022-03-31
Payer: MEDICARE

## 2022-03-31 PROCEDURE — 99490 PR CHRONIC CARE MGMT, 1ST 20 MIN: ICD-10-PCS | Mod: S$GLB,,, | Performed by: FAMILY MEDICINE

## 2022-03-31 PROCEDURE — 99490 CHRNC CARE MGMT STAFF 1ST 20: CPT | Mod: S$GLB,,, | Performed by: FAMILY MEDICINE

## 2022-04-01 ENCOUNTER — HOSPITAL ENCOUNTER (OUTPATIENT)
Dept: RADIOLOGY | Facility: HOSPITAL | Age: 82
Discharge: HOME | End: 2022-04-01
Attending: FAMILY MEDICINE
Payer: MEDICARE

## 2022-04-01 DIAGNOSIS — M54.16 LUMBAR RADICULOPATHY, CHRONIC: ICD-10-CM

## 2022-04-01 PROCEDURE — 72148 MRI LUMBAR SPINE W/O DYE: CPT | Mod: TC,PO

## 2022-04-04 ENCOUNTER — TELEPHONE (OUTPATIENT)
Dept: FAMILY MEDICINE | Facility: CLINIC | Age: 82
End: 2022-04-04
Payer: MEDICARE

## 2022-04-04 NOTE — TELEPHONE ENCOUNTER
----- Message from Kimber Cuevas sent at 4/4/2022  3:01 PM CDT -----  Type:  Test Results    Who Called: PT  Name of Test (Lab/Mammo/Etc): MRI  Date of Test: 04/01/22  Ordering Provider: Dr COBOS  Where the test was performed: [facility]  Would the patient rather a call back or a response via [employer]? call  Best Call Back Number: [phone]  Additional Information:

## 2022-04-06 ENCOUNTER — TELEPHONE (OUTPATIENT)
Dept: FAMILY MEDICINE | Facility: CLINIC | Age: 82
End: 2022-04-06
Payer: MEDICARE

## 2022-04-06 DIAGNOSIS — M54.16 LUMBAR RADICULOPATHY, CHRONIC: Primary | ICD-10-CM

## 2022-04-06 NOTE — TELEPHONE ENCOUNTER
Dr wilson can you put in referral for external    Alverto Truong Staff  MRN:  [id number]     Patient: Teo Gloria     Phone: [phone]     Good Morning;     This pt see's Dr. Jarvis as PCP. I spoke with pt this morning and he agrees to see pain management but he is wanting to see Dr. Lm Mistry who comes to the [facility] every other Thursday afternoon, PH: [phone].     If needed, you may reach me via [employer] or at the number listed below.     Thank you     Alverto Diaz LPN   Care Coordinator   [facility]   [phone] ext [phone]

## 2022-04-06 NOTE — TELEPHONE ENCOUNTER
Sent in referral for pain management-says going to see an outside physician-arrange to  MRI from Radiology

## 2022-04-07 NOTE — TELEPHONE ENCOUNTER
Spoke to pt and faxed referral to pain clinic Dr. Mistry [phone]. I gave pt medical record number for [facility] in [place] for pt to retrieve disk for MRI

## 2022-04-14 ENCOUNTER — TELEPHONE (OUTPATIENT)
Dept: FAMILY MEDICINE | Facility: CLINIC | Age: 82
End: 2022-04-14
Payer: MEDICARE

## 2022-04-14 NOTE — TELEPHONE ENCOUNTER
I spoke with [facility] they will burn the mri on a disc  for the pt and he can pick it up monday

## 2022-04-14 NOTE — TELEPHONE ENCOUNTER
----- Message from Kimber Cuevas sent at 4/14/2022  9:03 AM CDT -----  Type:  Test Results    Who Called: pt  Name of Test (Lab/Mammo/Etc): MRI  Date of Test: 2 weeks ago  Ordering Provider: Dr wilson  Where the test was performed: [facility]  Would the patient rather a call back or a response via [facility]? Please call  Best Call Back Number: [phone]  Additional Information:

## 2022-04-18 DIAGNOSIS — I10 ESSENTIAL HYPERTENSION: ICD-10-CM

## 2022-04-18 DIAGNOSIS — I49.1 PAC (PREMATURE ATRIAL CONTRACTION): ICD-10-CM

## 2022-04-18 DIAGNOSIS — I47.19 ATRIAL TACHYCARDIA: ICD-10-CM

## 2022-04-18 RX ORDER — METOPROLOL SUCCINATE 25 MG/1
12.5 TABLET, EXTENDED RELEASE ORAL DAILY
Qty: 15 TABLET | Refills: 11 | Status: SHIPPED | OUTPATIENT
Start: 2022-04-18 | End: 2023-05-24 | Stop reason: SDUPTHER

## 2022-04-18 NOTE — TELEPHONE ENCOUNTER
----- Message from Oliverio Christianson sent at 4/18/2022 10:14 AM CDT -----  Contact: patient  [phone]  Refill request for metoprolol succinate (TOPROL-XL) 25 MG 24 hr tablet  Pharmacy: [id number]  [address]

## 2022-04-30 ENCOUNTER — EXTERNAL CHRONIC CARE MANAGEMENT (OUTPATIENT)
Dept: PRIMARY CARE CLINIC | Facility: CLINIC | Age: 82
End: 2022-04-30
Payer: MEDICARE

## 2022-04-30 PROCEDURE — 99439 PR CHRONIC CARE MGMT, EA ADDTL 20 MIN: ICD-10-PCS | Mod: S$GLB,,, | Performed by: FAMILY MEDICINE

## 2022-04-30 PROCEDURE — 99490 CHRNC CARE MGMT STAFF 1ST 20: CPT | Mod: S$GLB,,, | Performed by: FAMILY MEDICINE

## 2022-04-30 PROCEDURE — 99439 CHRNC CARE MGMT STAF EA ADDL: CPT | Mod: S$GLB,,, | Performed by: FAMILY MEDICINE

## 2022-04-30 PROCEDURE — 99490 PR CHRONIC CARE MGMT, 1ST 20 MIN: ICD-10-PCS | Mod: S$GLB,,, | Performed by: FAMILY MEDICINE

## 2022-05-04 ENCOUNTER — TELEPHONE (OUTPATIENT)
Dept: FAMILY MEDICINE | Facility: CLINIC | Age: 82
End: 2022-05-04
Payer: MEDICARE

## 2022-05-04 RX ORDER — CIPROFLOXACIN 250 MG/1
250 TABLET, FILM COATED ORAL 2 TIMES DAILY
Qty: 28 TABLET | Refills: 0 | Status: SHIPPED | OUTPATIENT
Start: 2022-05-04 | End: 2022-09-06

## 2022-05-04 NOTE — TELEPHONE ENCOUNTER
This message was placed in the wrong chart  [facility] and canceled rx    Message moved to pt wife chart

## 2022-05-04 NOTE — TELEPHONE ENCOUNTER
The patient has appt with Dr Teague next week for stents to be changed  Dr Teague is on vacation this week    His staff suggested Mr Gloria call the pt pcp  The pt usually takes abx  1 week before and 1 week after the replacement.  He is unsure of the abx that she usually takes    Stents are changed every 6 months    [facility]    Please advise pt

## 2022-05-04 NOTE — TELEPHONE ENCOUNTER
Sent in prescription for him-he needs to discontinue the Zanaflex muscle relaxer while on this medicine

## 2022-05-31 ENCOUNTER — EXTERNAL CHRONIC CARE MANAGEMENT (OUTPATIENT)
Dept: PRIMARY CARE CLINIC | Facility: CLINIC | Age: 82
End: 2022-05-31
Payer: MEDICARE

## 2022-05-31 PROCEDURE — 99490 PR CHRONIC CARE MGMT, 1ST 20 MIN: ICD-10-PCS | Mod: S$GLB,,, | Performed by: FAMILY MEDICINE

## 2022-05-31 PROCEDURE — 99490 CHRNC CARE MGMT STAFF 1ST 20: CPT | Mod: S$GLB,,, | Performed by: FAMILY MEDICINE

## 2022-06-30 ENCOUNTER — EXTERNAL CHRONIC CARE MANAGEMENT (OUTPATIENT)
Dept: PRIMARY CARE CLINIC | Facility: CLINIC | Age: 82
End: 2022-06-30
Payer: MEDICARE

## 2022-06-30 PROCEDURE — 99490 CHRNC CARE MGMT STAFF 1ST 20: CPT | Mod: S$GLB,,, | Performed by: FAMILY MEDICINE

## 2022-06-30 PROCEDURE — 99490 PR CHRONIC CARE MGMT, 1ST 20 MIN: ICD-10-PCS | Mod: S$GLB,,, | Performed by: FAMILY MEDICINE

## 2022-07-01 ENCOUNTER — TELEPHONE (OUTPATIENT)
Dept: FAMILY MEDICINE | Facility: CLINIC | Age: 82
End: 2022-07-01
Payer: MEDICARE

## 2022-07-01 RX ORDER — PREDNISONE 5 MG/1
5 TABLET ORAL 2 TIMES DAILY
Qty: 180 TABLET | Refills: 0 | Status: SHIPPED | OUTPATIENT
Start: 2022-07-01 | End: 2022-10-10 | Stop reason: SDUPTHER

## 2022-07-01 NOTE — TELEPHONE ENCOUNTER
----- Message from Fabiana Romero sent at 7/1/2022  2:19 PM CDT -----  Type:  RX Refill Request    Who Called:  pt  Refill or New Rx: refill   RX Name and Strength: predniSONE (DELTASONE) 5 MG tablet  How is the patient currently taking it? (ex. 1XDay): Route: Take 1 tablet (5 mg total) by mouth 2 (two) times daily. -  Is this a 30 day or 90 day RX: 180  Preferred Pharmacy with phone number:[id number] - [address]   Phone: [phone]  Fax:  [phone]        Local or Mail Order: local  Ordering Provider: [facility]. jos[facility] Huang  Would the patient rather a call back or a response via [facility]?  Call   Best Call Back Number: [phone]  Additional Information:  pt completely out would like to get filled today

## 2022-07-15 RX ORDER — FUROSEMIDE 20 MG/1
20 TABLET ORAL DAILY
Qty: 30 TABLET | Refills: 2 | Status: SHIPPED | OUTPATIENT
Start: 2022-07-15 | End: 2022-09-06

## 2022-07-15 NOTE — TELEPHONE ENCOUNTER
----- Message from Sarina Munoz sent at 7/15/2022  9:33 AM CDT -----  Regarding: Refills  Contact: [phone]  Type:  RX Refill Request    Who Called: PT   Refill or New Rx: Refills   RX Name and Strength: furosemide (LASIX) 20 MG tablet 90 tablet   How is the patient currently taking it? (ex. 1XDay): 1 x a day   Is this a 30 day or 90 day RX: 90  Preferred Pharmacy with phone number: [id number]  [address] Phone:  [phone] Fax:  [phone]

## 2022-07-15 NOTE — TELEPHONE ENCOUNTER
No new care gaps identified.  [facility] Embedded Care Gaps. Reference number: [id number]. 7/15/2022   9:37:14 AM CDT

## 2022-07-19 ENCOUNTER — PES CALL (OUTPATIENT)
Dept: ADMINISTRATIVE | Facility: CLINIC | Age: 82
End: 2022-07-19
Payer: MEDICARE

## 2022-07-31 ENCOUNTER — EXTERNAL CHRONIC CARE MANAGEMENT (OUTPATIENT)
Dept: PRIMARY CARE CLINIC | Facility: CLINIC | Age: 82
End: 2022-07-31
Payer: MEDICARE

## 2022-07-31 PROCEDURE — 99490 PR CHRONIC CARE MGMT, 1ST 20 MIN: ICD-10-PCS | Mod: S$GLB,,, | Performed by: FAMILY MEDICINE

## 2022-07-31 PROCEDURE — 99490 CHRNC CARE MGMT STAFF 1ST 20: CPT | Mod: S$GLB,,, | Performed by: FAMILY MEDICINE

## 2022-08-17 ENCOUNTER — DOCUMENTATION ONLY (OUTPATIENT)
Dept: HEPATOLOGY | Facility: HOSPITAL | Age: 82
End: 2022-08-17
Payer: MEDICARE

## 2022-08-17 ENCOUNTER — OUTSIDE PLACE OF SERVICE (OUTPATIENT)
Dept: CARDIOLOGY | Facility: CLINIC | Age: 82
End: 2022-08-17
Payer: MEDICARE

## 2022-08-17 PROCEDURE — 93010 PR ELECTROCARDIOGRAM REPORT: ICD-10-PCS | Mod: ,,, | Performed by: INTERNAL MEDICINE

## 2022-08-17 PROCEDURE — 93010 ELECTROCARDIOGRAM REPORT: CPT | Mod: ,,, | Performed by: INTERNAL MEDICINE

## 2022-08-17 NOTE — PROGRESS NOTES
Contacted by [facility] to transfer for Cardiology evaluation.  Upon waiting to speak to sending MD, found he was redirected to [facility].    Sharron Titus MD, [facility]  Senior Physician  [facility]

## 2022-08-31 ENCOUNTER — OUTSIDE PLACE OF SERVICE (OUTPATIENT)
Dept: CARDIOLOGY | Facility: CLINIC | Age: 82
End: 2022-08-31
Payer: MEDICARE

## 2022-08-31 ENCOUNTER — EXTERNAL CHRONIC CARE MANAGEMENT (OUTPATIENT)
Dept: PRIMARY CARE CLINIC | Facility: CLINIC | Age: 82
End: 2022-08-31
Payer: MEDICARE

## 2022-08-31 PROCEDURE — 93010 PR ELECTROCARDIOGRAM REPORT: ICD-10-PCS | Mod: ,,, | Performed by: INTERNAL MEDICINE

## 2022-08-31 PROCEDURE — 99490 PR CHRONIC CARE MGMT, 1ST 20 MIN: ICD-10-PCS | Mod: S$GLB,,, | Performed by: FAMILY MEDICINE

## 2022-08-31 PROCEDURE — 93010 ELECTROCARDIOGRAM REPORT: CPT | Mod: ,,, | Performed by: INTERNAL MEDICINE

## 2022-08-31 PROCEDURE — 99490 CHRNC CARE MGMT STAFF 1ST 20: CPT | Mod: S$GLB,,, | Performed by: FAMILY MEDICINE

## 2022-09-01 ENCOUNTER — TELEPHONE (OUTPATIENT)
Dept: FAMILY MEDICINE | Facility: CLINIC | Age: 82
End: 2022-09-01
Payer: MEDICARE

## 2022-09-01 NOTE — TELEPHONE ENCOUNTER
----- Message from Kimber Cuevas sent at 9/1/2022 11:11 AM CDT -----  Type:  Needs Medical Advice    Who Called: pt  Symptoms (please be specific): pt was seen in the ER and was told to follow up with PCP   How long has patient had these symptoms:  fluid in body/and possible taking to many different medicines are being taken    Would the patient rather a call back or a response via [facility]? call  Best Call Back Number: [phone]  Additional Information: pt refused first appointment for 10/17/22

## 2022-09-06 ENCOUNTER — OFFICE VISIT (OUTPATIENT)
Dept: FAMILY MEDICINE | Facility: CLINIC | Age: 82
End: 2022-09-06
Payer: MEDICARE

## 2022-09-06 VITALS
BODY MASS INDEX: 31.56 KG/M2 | OXYGEN SATURATION: 98 % | TEMPERATURE: 98 F | HEIGHT: 68 IN | DIASTOLIC BLOOD PRESSURE: 66 MMHG | SYSTOLIC BLOOD PRESSURE: 108 MMHG | WEIGHT: 208.25 LBS | HEART RATE: 49 BPM

## 2022-09-06 DIAGNOSIS — I95.9 HYPOTENSION, UNSPECIFIED HYPOTENSION TYPE: Primary | ICD-10-CM

## 2022-09-06 PROCEDURE — 99213 PR OFFICE/OUTPT VISIT, EST, LEVL III, 20-29 MIN: ICD-10-PCS | Mod: S$GLB,,, | Performed by: FAMILY MEDICINE

## 2022-09-06 PROCEDURE — 99213 OFFICE O/P EST LOW 20 MIN: CPT | Mod: S$GLB,,, | Performed by: FAMILY MEDICINE

## 2022-09-06 RX ORDER — LISINOPRIL 10 MG/1
10 TABLET ORAL DAILY
COMMUNITY
Start: 2022-08-25 | End: 2023-09-25 | Stop reason: SDUPTHER

## 2022-09-06 RX ORDER — ASPIRIN 81 MG/1
81 TABLET ORAL DAILY
COMMUNITY

## 2022-09-18 NOTE — PROGRESS NOTES
" Patient ID: Teo Gloria is a 82 y.o. male.    Chief Complaint: er f/u    HPI      Teo Gloria is a 82 y.o. male patient presented to the emergency room at [facility] because of low blood pressure.  Was transferred to [facility] for continued evaluation.  Blood pressure and cardiovascular studies were all normal.  So he was discharged home.  Currently with no problems on current medications.  No chest pain palpitations.  No hypotension.    Vitals:    09/06/22 1141   BP: 108/66   BP Location: Right arm   Patient Position: Sitting   Pulse: (!) 49   Temp: 98.1 °F (36.7 °C)   TempSrc: Oral   SpO2: 98%   Weight: 94.4 kg (208 lb 3.6 oz)   Height: 5' 8" (1.727 m)            Review of Symptoms      Physical Exam    Constitutional:  Oriented to person, place, and time.appears well-developed and well-nourished.  No distress.      HENT  Head: Normocephalic and atraumatic  Right Ear: External ear normal.   Left Ear: External ear normal.   Nose: External nose normal.   Mouth:  Moist mucus membranes.    Eyes:  Conjunctivae are normal. Right eye exhibits no discharge.  Left eye exhibits no discharge. No scleral icterus.  No periorbital edema    Cardiovascular:  Regular rate and rhythm with normal S1 and S2     Pulmonary/Chest:   Clear to auscultation bilaterally without wheezes, rhonchi or rales      Musculoskeletal:  No edema. No obvious deformity No wasting       Neurological:  Alert and oriented to person, place, and time.   Coordination normal.     Skin:   Skin is warm and dry.  No diaphoresis.   No rash noted.     Psychiatric: Normal mood and affect. Behavior is normal.  Judgment and thought content normal.     Complete Blood Count  Lab Results   Component Value Date    RBC 3.93 (L) 02/21/2022    HGB 11.7 (L) 02/21/2022    HCT 36.4 (L) 02/21/2022    MCV 93 02/21/2022    MCH 29.8 02/21/2022    MCHC 32.1 02/21/2022    RDW 12.7 02/21/2022     02/21/2022    MPV 11.2 02/21/2022    GRAN 5.5 " 02/21/2022    GRAN 76.9 (H) 02/21/2022    LYMPH 1.1 02/21/2022    LYMPH 15.8 (L) 02/21/2022    MONO 0.3 02/21/2022    MONO 4.6 02/21/2022    EOS 0.0 02/21/2022    BASO 0.08 02/21/2022    EOSINOPHIL 0.6 02/21/2022    BASOPHIL 1.1 02/21/2022    DIFFMETHOD Automated 02/21/2022       Comprehensive Metabolic Panel  No results found for: GLU, BUN, CREATININE, NA, K, CL, PROT, ALBUMIN, BILITOT, AST, ALKPHOS, CO2, ALT, ANIONGAP, EGFRNONAA, ESTGFRAFRICA    TSH  No results found for: TSH    Assessment / Plan:      ICD-10-CM ICD-9-CM   1. Hypotension, unspecified hypotension type  I95.9 458.9     Hypotension, unspecified hypotension type  Comments:  Resolved with fluids and change blood pressure medication.      Feels great on current medication.

## 2022-09-30 ENCOUNTER — EXTERNAL CHRONIC CARE MANAGEMENT (OUTPATIENT)
Dept: PRIMARY CARE CLINIC | Facility: CLINIC | Age: 82
End: 2022-09-30
Payer: MEDICARE

## 2022-09-30 PROCEDURE — 99490 CHRNC CARE MGMT STAFF 1ST 20: CPT | Mod: S$GLB,,, | Performed by: FAMILY MEDICINE

## 2022-09-30 PROCEDURE — 99490 PR CHRONIC CARE MGMT, 1ST 20 MIN: ICD-10-PCS | Mod: S$GLB,,, | Performed by: FAMILY MEDICINE

## 2022-10-04 NOTE — TELEPHONE ENCOUNTER
----- Message from KAMILLE Gill ANP sent at 8/28/2020  3:13 PM CDT -----  Echo and Holter results reviewed. Echo normal with no acute findings. Holter monitor overall normal with occasional fast beats which is not overly concerning. He should continue his current medications and follow up with Dr. De Leon in 4-6 months. If he is continuing to have any cardiac symptoms then he should follow up with Dr. De Leon sooner    Thanks  Shraddha   QT/QTc: 306/390

## 2022-10-10 RX ORDER — PREDNISONE 5 MG/1
5 TABLET ORAL 2 TIMES DAILY
Qty: 180 TABLET | Refills: 0 | Status: SHIPPED | OUTPATIENT
Start: 2022-10-10 | End: 2023-02-20

## 2022-10-10 NOTE — TELEPHONE ENCOUNTER
No new care gaps identified.  [facility] Embedded Care Gaps. Reference number: [id number]. 10/10/2022   1:41:26 PM CDT

## 2022-10-10 NOTE — TELEPHONE ENCOUNTER
----- Message from Lesly Garcia sent at 10/10/2022  1:31 PM CDT -----  Type:  RX Refill Request    Who Called: Pt   Refill or New Rx:refill   RX Name and Strength:predniSONE (DELTASONE) 5 MG tablet  How is the patient currently taking it? (ex. 1XDay):2xday   Is this a 30 day or 90 day RX:90   Preferred Pharmacy with phone number:[id number] [address]   Phone: [phone]  Fax:  [phone]  Would the patient rather a call back or a response via [facility]? Call back   Best Call Back Number:[phone]  Additional Information:

## 2022-10-31 ENCOUNTER — EXTERNAL CHRONIC CARE MANAGEMENT (OUTPATIENT)
Dept: PRIMARY CARE CLINIC | Facility: CLINIC | Age: 82
End: 2022-10-31
Payer: MEDICARE

## 2022-10-31 PROCEDURE — 99439 PR CHRONIC CARE MGMT, EA ADDTL 20 MIN: ICD-10-PCS | Mod: S$GLB,,, | Performed by: FAMILY MEDICINE

## 2022-10-31 PROCEDURE — 99490 CHRNC CARE MGMT STAFF 1ST 20: CPT | Mod: S$GLB,,, | Performed by: FAMILY MEDICINE

## 2022-10-31 PROCEDURE — 99490 PR CHRONIC CARE MGMT, 1ST 20 MIN: ICD-10-PCS | Mod: S$GLB,,, | Performed by: FAMILY MEDICINE

## 2022-10-31 PROCEDURE — 99439 CHRNC CARE MGMT STAF EA ADDL: CPT | Mod: S$GLB,,, | Performed by: FAMILY MEDICINE

## 2022-11-10 RX ORDER — GABAPENTIN 100 MG/1
CAPSULE ORAL
Qty: 270 CAPSULE | Refills: 3 | Status: SHIPPED | OUTPATIENT
Start: 2022-11-10

## 2022-11-10 NOTE — TELEPHONE ENCOUNTER
No new care gaps identified.  [facility] Embedded Care Gaps. Reference number: [id number]. 11/10/2022   10:15:08 AM CST

## 2022-11-10 NOTE — TELEPHONE ENCOUNTER
----- Message from Kimber Cuevas sent at 11/10/2022 10:07 AM CST -----  Type:  RX Refill Request    Who Called: pt  Refill or New Rx:refill  RX Name and Strength:gabapentin (NEURONTIN) 100 MG capsule  How is the patient currently taking it? (ex. 1XDay):One to three tablets each night.  Is this a 30 day or 90 day RX:270  Preferred Pharmacy with phone number:[id number] [place] [id number] [address]  Phone: [phone] Fax: [phone]  Hours: Not open 24 hours      Local or Mail Order:local  Ordering Provider:Dr Hector  Would the patient rather a call back or a response via [facility]? call  Best Call Back Number:[phone]  Additional Information:     Type:  RX Refill Request    Who Called: pt  Refill or New Rx:refill  RX Name and Strength:amitriptyline 10 mg  How is the patient currently taking it? (ex. 1XDay):take nightly  Is this a 30 day or 90 day RX:90

## 2022-11-30 ENCOUNTER — EXTERNAL CHRONIC CARE MANAGEMENT (OUTPATIENT)
Dept: PRIMARY CARE CLINIC | Facility: CLINIC | Age: 82
End: 2022-11-30
Payer: MEDICARE

## 2022-11-30 PROCEDURE — 99490 PR CHRONIC CARE MGMT, 1ST 20 MIN: ICD-10-PCS | Mod: S$GLB,,, | Performed by: FAMILY MEDICINE

## 2022-11-30 PROCEDURE — 99490 CHRNC CARE MGMT STAFF 1ST 20: CPT | Mod: S$GLB,,, | Performed by: FAMILY MEDICINE

## 2022-11-30 PROCEDURE — 99439 PR CHRONIC CARE MGMT, EA ADDTL 20 MIN: ICD-10-PCS | Mod: S$GLB,,, | Performed by: FAMILY MEDICINE

## 2022-11-30 PROCEDURE — 99439 CHRNC CARE MGMT STAF EA ADDL: CPT | Mod: S$GLB,,, | Performed by: FAMILY MEDICINE

## 2022-12-31 ENCOUNTER — EXTERNAL CHRONIC CARE MANAGEMENT (OUTPATIENT)
Dept: PRIMARY CARE CLINIC | Facility: CLINIC | Age: 82
End: 2022-12-31
Payer: MEDICARE

## 2022-12-31 PROCEDURE — 99490 CHRNC CARE MGMT STAFF 1ST 20: CPT | Mod: S$GLB,,, | Performed by: FAMILY MEDICINE

## 2022-12-31 PROCEDURE — 99490 PR CHRONIC CARE MGMT, 1ST 20 MIN: ICD-10-PCS | Mod: S$GLB,,, | Performed by: FAMILY MEDICINE

## 2023-01-23 ENCOUNTER — TELEPHONE (OUTPATIENT)
Dept: FAMILY MEDICINE | Facility: CLINIC | Age: 83
End: 2023-01-23
Payer: MEDICARE

## 2023-01-23 RX ORDER — AMITRIPTYLINE HYDROCHLORIDE 10 MG/1
TABLET, FILM COATED ORAL
Qty: 90 TABLET | Refills: 2 | Status: SHIPPED | OUTPATIENT
Start: 2023-01-23 | End: 2023-10-04

## 2023-01-23 NOTE — TELEPHONE ENCOUNTER
----- Message from Lesly Garcia sent at 1/23/2023  9:13 AM CST -----  Type:  RX Refill Request    Who Called: Pt   Refill or New Rx:refill   RX Name and Strength:amitriptyline (ELAVIL) 10 MG tablet   How is the patient currently taking it? (ex. 1XDay):1  Is this a 30 day or 90 day RX:90  Preferred Pharmacy with phone number:[id number] [address]   Phone: [phone]  Fax:  [phone]  Would the patient rather a call back or a response via [facility]? Call back   Best Call Back Number:[phone]  Additional Information:

## 2023-01-31 ENCOUNTER — EXTERNAL CHRONIC CARE MANAGEMENT (OUTPATIENT)
Dept: PRIMARY CARE CLINIC | Facility: CLINIC | Age: 83
End: 2023-01-31
Payer: MEDICARE

## 2023-01-31 PROCEDURE — 99490 PR CHRONIC CARE MGMT, 1ST 20 MIN: ICD-10-PCS | Mod: S$GLB,,, | Performed by: FAMILY MEDICINE

## 2023-01-31 PROCEDURE — 99490 CHRNC CARE MGMT STAFF 1ST 20: CPT | Mod: S$GLB,,, | Performed by: FAMILY MEDICINE

## 2023-02-20 ENCOUNTER — HOSPITAL ENCOUNTER (OUTPATIENT)
Dept: RADIOLOGY | Facility: HOSPITAL | Age: 83
Discharge: HOME | End: 2023-02-20
Attending: FAMILY MEDICINE
Payer: MEDICARE

## 2023-02-20 ENCOUNTER — OFFICE VISIT (OUTPATIENT)
Dept: FAMILY MEDICINE | Facility: CLINIC | Age: 83
End: 2023-02-20
Payer: MEDICARE

## 2023-02-20 VITALS
SYSTOLIC BLOOD PRESSURE: 150 MMHG | RESPIRATION RATE: 92 BRPM | DIASTOLIC BLOOD PRESSURE: 62 MMHG | HEART RATE: 74 BPM | BODY MASS INDEX: 31.66 KG/M2 | HEIGHT: 68 IN

## 2023-02-20 DIAGNOSIS — M25.472 LEFT ANKLE SWELLING: ICD-10-CM

## 2023-02-20 DIAGNOSIS — M25.579 ANKLE PAIN, UNSPECIFIED CHRONICITY, UNSPECIFIED LATERALITY: ICD-10-CM

## 2023-02-20 DIAGNOSIS — R39.9 UTI SYMPTOMS: ICD-10-CM

## 2023-02-20 DIAGNOSIS — I10 ESSENTIAL HYPERTENSION: ICD-10-CM

## 2023-02-20 DIAGNOSIS — Z23 NEED FOR PNEUMOCOCCAL VACCINATION: ICD-10-CM

## 2023-02-20 DIAGNOSIS — M25.579 ANKLE PAIN, UNSPECIFIED CHRONICITY, UNSPECIFIED LATERALITY: Primary | ICD-10-CM

## 2023-02-20 PROCEDURE — 73610 X-RAY EXAM OF ANKLE: CPT | Mod: TC,FY,PO,LT

## 2023-02-20 PROCEDURE — 99214 PR OFFICE/OUTPT VISIT, EST, LEVL IV, 30-39 MIN: ICD-10-PCS | Mod: 25,S$GLB,, | Performed by: FAMILY MEDICINE

## 2023-02-20 PROCEDURE — 99214 OFFICE O/P EST MOD 30 MIN: CPT | Mod: 25,S$GLB,, | Performed by: FAMILY MEDICINE

## 2023-02-20 PROCEDURE — 73610 X-RAY EXAM OF ANKLE: CPT | Mod: 26,LT,, | Performed by: RADIOLOGY

## 2023-02-20 PROCEDURE — 73610 XR ANKLE COMPLETE 3 VIEW LEFT: ICD-10-PCS | Mod: 26,LT,, | Performed by: RADIOLOGY

## 2023-02-20 PROCEDURE — 96372 THER/PROPH/DIAG INJ SC/IM: CPT | Mod: S$GLB,,, | Performed by: FAMILY MEDICINE

## 2023-02-20 PROCEDURE — 96372 PR INJECTION,THERAP/PROPH/DIAG2ST, IM OR SUBCUT: ICD-10-PCS | Mod: S$GLB,,, | Performed by: FAMILY MEDICINE

## 2023-02-20 RX ORDER — KETOROLAC TROMETHAMINE 30 MG/ML
60 INJECTION, SOLUTION INTRAMUSCULAR; INTRAVENOUS ONCE
Status: COMPLETED | OUTPATIENT
Start: 2023-02-20 | End: 2023-02-20

## 2023-02-20 RX ORDER — PREDNISONE 10 MG/1
TABLET ORAL
Qty: 18 TABLET | Refills: 0 | Status: SHIPPED | OUTPATIENT
Start: 2023-02-20 | End: 2023-10-12 | Stop reason: ALTCHOICE

## 2023-02-20 RX ORDER — TRIAMCINOLONE ACETONIDE 40 MG/ML
80 INJECTION, SUSPENSION INTRA-ARTICULAR; INTRAMUSCULAR ONCE
Status: COMPLETED | OUTPATIENT
Start: 2023-02-20 | End: 2023-02-20

## 2023-02-20 RX ADMIN — TRIAMCINOLONE ACETONIDE 80 MG: 40 INJECTION, SUSPENSION INTRA-ARTICULAR; INTRAMUSCULAR at 05:02

## 2023-02-20 RX ADMIN — KETOROLAC TROMETHAMINE 60 MG: 30 INJECTION, SOLUTION INTRAMUSCULAR; INTRAVENOUS at 05:02

## 2023-02-21 ENCOUNTER — TELEPHONE (OUTPATIENT)
Dept: FAMILY MEDICINE | Facility: CLINIC | Age: 83
End: 2023-02-21
Payer: MEDICARE

## 2023-02-22 NOTE — TELEPHONE ENCOUNTER
Spoke with pt, he said he will check his pressure tomorrow.     Can we call him before 12 tomorrow to get that bp reading?    He was unable to check it while on the phone.

## 2023-02-22 NOTE — TELEPHONE ENCOUNTER
Call pt for home bp readings or schedule 2 week nurse visit for bp check  Pt in clinic with elevated BP on 2/19/2023

## 2023-02-23 NOTE — TELEPHONE ENCOUNTER
157/66 this morning about 15 after meds  158/71 yesterday evening       Pt will check  bp meds Friday 2 hours after meds  He is unable to check in now     We need to touch base with him Friday around 1030ish

## 2023-02-24 RX ORDER — METOLAZONE 2.5 MG/1
2.5 TABLET ORAL DAILY
Qty: 90 TABLET | Refills: 0 | Status: SHIPPED | OUTPATIENT
Start: 2023-02-24 | End: 2023-09-05 | Stop reason: SDUPTHER

## 2023-02-24 NOTE — TELEPHONE ENCOUNTER
atus: Read     Message  Received: Today   Same Day Access Requested  Ewa Truong Staff  Caller: Unspecified (Today,  4:27 PM)  Type:  Patient Returning Call     Who Called: pt   Who Left Message for Patient:   Does the patient know what this is regarding?:   Would the patient rather a call back or a response via [facility]? call   Best Call Back Number: [phone]   Additional Information:

## 2023-02-24 NOTE — TELEPHONE ENCOUNTER
The outside is hurting on the bone on the ankle  Hurt again last night   Stayed off of it this morning but cut grass this afternoon ( riding )  Ankle is still swelling

## 2023-02-24 NOTE — TELEPHONE ENCOUNTER
I sent in medication for a fluid pill.  How is he feeling?  How is is ankle?  Lab work is normal-x-ray shows a possible old fracture on the inside of his leg not the outside.      Please message me back on how he is doing.

## 2023-02-24 NOTE — TELEPHONE ENCOUNTER
See BP readings below ( yesterdays readings lissted in previous message below )  Today he took meds at 830 am  10 am 156/67  1113 am  170/79      Also he said you were supposed to order a different fluid pill for him to take 3 x a week  It was not sent to [facility]    Pt c/o ankle swelling

## 2023-02-27 NOTE — PROGRESS NOTES
" Patient ID: Teo Gloria is a 82 y.o. male.    Chief Complaint: Ankle Pain, Joint Swelling, and Hand Injury    HPI       Teo Gloria is a 82 y.o. male complain of acute onset of left ankle pain.  Especially on the lateral aspect.  Complains of swelling of his left hand and left foot.  No fever chills.  No nausea vomiting diarrhea.  No CHF symptoms.      Review of Symptoms      BP (!) 150/62 (BP Location: Left arm, Patient Position: Sitting)   Pulse 74   Resp (!) 92   Ht 5' 8" (1.727 m)   BMI 31.66 kg/m²     Physical Exam    Vitals:    02/20/23 1125   BP: (!) 150/62   BP Location: Left arm   Patient Position: Sitting   Pulse: 74   Resp: (!) 92   Height: 5' 8" (1.727 m)       Constitutional:   Oriented to person, place, and time.appears well-developed and well-nourished.   No distress.      HENT  Head: Normocephalic and atraumatic  Right Ear: External ear normal.   Left Ear: External ear normal.   Nose: External nose normal.   Mouth: Moist mucous membranes    Eyes:   Conjunctivae are normal. Right eye exhibits no discharge. Left eye exhibits no discharge. No scleral icterus. No periorbital edema    Musculoskeletal:  Swelling left ankle tenderness to palpation lateral epicondyle inferiorly.  No increased warmth or erythema.     Neurological:  Alert and oriented to person, place, and time. Coordination normal.     Skin:   Skin is warm and dry.  No diaphoresis.   No rash noted.     Psychiatric: Normal mood and affect. Behavior is normal. Judgment and thought content normal.     Complete Blood Count  Lab Results   Component Value Date    RBC 4.13 (L) 02/20/2023    HGB 12.3 (L) 02/20/2023    HCT 37.9 (L) 02/20/2023    MCV 92 02/20/2023    MCH 29.8 02/20/2023    MCHC 32.5 02/20/2023    RDW 13.0 02/20/2023     02/20/2023    MPV 10.5 02/20/2023    GRAN 6.1 02/20/2023    GRAN 67.9 02/20/2023    LYMPH 1.4 02/20/2023    LYMPH 15.0 (L) 02/20/2023    MONO 1.0 02/20/2023    MONO 10.7 02/20/2023    EOS 0.4 " 02/20/2023    BASO 0.07 02/20/2023    EOSINOPHIL 4.3 02/20/2023    BASOPHIL 0.8 02/20/2023    DIFFMETHOD Automated 02/20/2023       Comprehensive Metabolic Panel  Lab Results   Component Value Date    GLU 94 02/20/2023    BUN 24 (H) 02/20/2023    CREATININE 0.96 02/20/2023     02/20/2023    K 3.8 02/20/2023     02/20/2023    PROT 6.7 02/20/2023    ALBUMIN 4.0 02/20/2023    BILITOT 0.7 02/20/2023    AST 26 02/20/2023    ALKPHOS 37 (L) 02/20/2023    CO2 27 02/20/2023    ALT 16 02/20/2023    ANIONGAP 5 (L) 02/20/2023       TSH  No results found for: TSH    Assessment / Plan:      ICD-10-CM ICD-9-CM   1. Ankle pain, unspecified chronicity, unspecified laterality  M25.579 719.47   2. Need for pneumococcal vaccination  Z23 V03.82   3. Left ankle swelling  M25.472 719.07   4. Essential hypertension  I10 401.9   5. UTI symptoms  R39.9 788.99     Ankle pain, unspecified chronicity, unspecified laterality  -     X-Ray Ankle Complete Left; Future; Expected date: 02/20/2023  -     Uric Acid; Future; Expected date: 02/20/2023    Need for pneumococcal vaccination  -     Cancel: (In Office Administered) Pneumococcal Conjugate Vaccine (20 Valent) (IM)    Left ankle swelling  -     Uric Acid; Future; Expected date: 02/20/2023    Essential hypertension  -     CBC Auto Differential; Future; Expected date: 02/20/2023  -     Comprehensive Metabolic Panel; Future; Expected date: 02/20/2023    UTI symptoms  -     Urinalysis; Future; Expected date: 02/20/2023    Other orders  -     predniSONE (DELTASONE) 10 MG tablet; 3 tablets by mouth for 3 days then 2 tablets by mouth for 3 days then 1 tablet by mouth for the 3 days.  Dispense: 18 tablet; Refill: 0  -     ketorolac injection 60 mg  -     triamcinolone acetonide injection 80 mg    Gout?

## 2023-02-28 ENCOUNTER — EXTERNAL CHRONIC CARE MANAGEMENT (OUTPATIENT)
Dept: PRIMARY CARE CLINIC | Facility: CLINIC | Age: 83
End: 2023-02-28
Payer: MEDICARE

## 2023-02-28 PROCEDURE — 99490 CHRNC CARE MGMT STAFF 1ST 20: CPT | Mod: S$GLB,,, | Performed by: FAMILY MEDICINE

## 2023-02-28 PROCEDURE — 99490 PR CHRONIC CARE MGMT, 1ST 20 MIN: ICD-10-PCS | Mod: S$GLB,,, | Performed by: FAMILY MEDICINE

## 2023-03-31 ENCOUNTER — EXTERNAL CHRONIC CARE MANAGEMENT (OUTPATIENT)
Dept: PRIMARY CARE CLINIC | Facility: CLINIC | Age: 83
End: 2023-03-31
Payer: MEDICARE

## 2023-03-31 PROCEDURE — 99490 PR CHRONIC CARE MGMT, 1ST 20 MIN: ICD-10-PCS | Mod: S$GLB,,, | Performed by: FAMILY MEDICINE

## 2023-03-31 PROCEDURE — 99490 CHRNC CARE MGMT STAFF 1ST 20: CPT | Mod: S$GLB,,, | Performed by: FAMILY MEDICINE

## 2023-04-30 ENCOUNTER — EXTERNAL CHRONIC CARE MANAGEMENT (OUTPATIENT)
Dept: PRIMARY CARE CLINIC | Facility: CLINIC | Age: 83
End: 2023-04-30
Payer: MEDICARE

## 2023-04-30 PROCEDURE — 99490 CHRNC CARE MGMT STAFF 1ST 20: CPT | Mod: S$GLB,,, | Performed by: FAMILY MEDICINE

## 2023-04-30 PROCEDURE — 99490 PR CHRONIC CARE MGMT, 1ST 20 MIN: ICD-10-PCS | Mod: S$GLB,,, | Performed by: FAMILY MEDICINE

## 2023-05-24 ENCOUNTER — TELEPHONE (OUTPATIENT)
Dept: FAMILY MEDICINE | Facility: CLINIC | Age: 83
End: 2023-05-24
Payer: MEDICARE

## 2023-05-24 DIAGNOSIS — I49.1 PAC (PREMATURE ATRIAL CONTRACTION): ICD-10-CM

## 2023-05-24 DIAGNOSIS — I47.19 ATRIAL TACHYCARDIA: ICD-10-CM

## 2023-05-24 DIAGNOSIS — I10 ESSENTIAL HYPERTENSION: ICD-10-CM

## 2023-05-24 RX ORDER — METOPROLOL SUCCINATE 25 MG/1
12.5 TABLET, EXTENDED RELEASE ORAL DAILY
Qty: 45 TABLET | Refills: 11 | Status: SHIPPED | OUTPATIENT
Start: 2023-05-24 | End: 2024-01-22

## 2023-05-24 NOTE — TELEPHONE ENCOUNTER
----- Message from Teri Real sent at 5/24/2023  2:49 PM CDT -----  Regarding: refill  Contact: self/ walked in    The pt is requesting a refill from Dr Hector for metoprolol succinate (TOPROL-XL) 25 MG 24 hr tablet.  This was prescribed by Dr Wang but the pt would like to have Dr Hector do it for him.      [facility]

## 2023-05-31 ENCOUNTER — EXTERNAL CHRONIC CARE MANAGEMENT (OUTPATIENT)
Dept: PRIMARY CARE CLINIC | Facility: CLINIC | Age: 83
End: 2023-05-31
Payer: MEDICARE

## 2023-05-31 PROCEDURE — 99490 PR CHRONIC CARE MGMT, 1ST 20 MIN: ICD-10-PCS | Mod: S$GLB,,, | Performed by: FAMILY MEDICINE

## 2023-05-31 PROCEDURE — 99490 CHRNC CARE MGMT STAFF 1ST 20: CPT | Mod: S$GLB,,, | Performed by: FAMILY MEDICINE

## 2023-06-30 ENCOUNTER — EXTERNAL CHRONIC CARE MANAGEMENT (OUTPATIENT)
Dept: PRIMARY CARE CLINIC | Facility: CLINIC | Age: 83
End: 2023-06-30
Payer: MEDICARE

## 2023-06-30 PROCEDURE — 99490 PR CHRONIC CARE MGMT, 1ST 20 MIN: ICD-10-PCS | Mod: S$GLB,,, | Performed by: FAMILY MEDICINE

## 2023-06-30 PROCEDURE — 99490 CHRNC CARE MGMT STAFF 1ST 20: CPT | Mod: S$GLB,,, | Performed by: FAMILY MEDICINE

## 2023-07-27 ENCOUNTER — TELEPHONE (OUTPATIENT)
Dept: FAMILY MEDICINE | Facility: CLINIC | Age: 83
End: 2023-07-27
Payer: MEDICARE

## 2023-07-27 RX ORDER — METHOCARBAMOL 500 MG/1
TABLET, FILM COATED ORAL
Qty: 45 TABLET | Refills: 1 | Status: SHIPPED | OUTPATIENT
Start: 2023-07-27 | End: 2023-09-14 | Stop reason: SDUPTHER

## 2023-07-27 NOTE — TELEPHONE ENCOUNTER
Patient went to ED yesterday due to taking pain medicine prescribed to him 2 years ago by Krissy, patient stated he felt loopy and his bp was high.     Patient stated he is stable at this time but is still having pain in his back and has no pain medicine to take. Patient is requesting Robaxin for the pain, stated it helped him before.     Patient also requesting sooner appointment.  ------------------------------------------------------------------------------  ----- Message from Catina Mott sent at 7/27/2023  9:23 AM CDT -----  Type:  Same Day Appointment Request    Caller is requesting a same day appointment.  Caller declined first available appointment listed below.    Name of Caller: Teo Gloria  When is the first available appointment? October  Symptoms: ER F/U (blood pressure)  Best Call Back Number: [phone]  Additional Information:  Pt would like to be seen today.  Pt sounded concerned.

## 2023-07-31 ENCOUNTER — EXTERNAL CHRONIC CARE MANAGEMENT (OUTPATIENT)
Dept: PRIMARY CARE CLINIC | Facility: CLINIC | Age: 83
End: 2023-07-31
Payer: MEDICARE

## 2023-07-31 PROCEDURE — 99490 CHRNC CARE MGMT STAFF 1ST 20: CPT | Mod: S$GLB,,, | Performed by: FAMILY MEDICINE

## 2023-07-31 PROCEDURE — 99490 PR CHRONIC CARE MGMT, 1ST 20 MIN: ICD-10-PCS | Mod: S$GLB,,, | Performed by: FAMILY MEDICINE

## 2023-08-31 ENCOUNTER — EXTERNAL CHRONIC CARE MANAGEMENT (OUTPATIENT)
Dept: PRIMARY CARE CLINIC | Facility: CLINIC | Age: 83
End: 2023-08-31
Payer: MEDICARE

## 2023-08-31 PROCEDURE — 99490 PR CHRONIC CARE MGMT, 1ST 20 MIN: ICD-10-PCS | Mod: S$GLB,,, | Performed by: FAMILY MEDICINE

## 2023-08-31 PROCEDURE — 99490 CHRNC CARE MGMT STAFF 1ST 20: CPT | Mod: S$GLB,,, | Performed by: FAMILY MEDICINE

## 2023-09-05 RX ORDER — METOLAZONE 2.5 MG/1
2.5 TABLET ORAL DAILY
Qty: 90 TABLET | Refills: 0 | Status: SHIPPED | OUTPATIENT
Start: 2023-09-05 | End: 2024-01-22

## 2023-09-05 NOTE — TELEPHONE ENCOUNTER
Received a message from [facility] nurse stating pt needed a refill of metolazone as he will be out of medicine tomorrow, 09/06/23.

## 2023-09-05 NOTE — TELEPHONE ENCOUNTER
No care due was identified.  [facility] Embedded Care Due Messages. Reference number: [id number].   9/05/2023 12:52:15 PM CDT

## 2023-09-07 ENCOUNTER — TELEPHONE (OUTPATIENT)
Dept: FAMILY MEDICINE | Facility: CLINIC | Age: 83
End: 2023-09-07
Payer: MEDICARE

## 2023-09-07 NOTE — TELEPHONE ENCOUNTER
----- Message from Jalen Young sent at 9/7/2023  9:26 AM CDT -----  Type:  RX Refill Request    Who Called: pt  Refill or New Rx:refill  RX Name and Strength:metOLazone (ZAROXOLYN) 2.5 MG tablet  [facility] with [id number] [address] [place] [address]  Local or Mail Order:local  Ordering Provider:St jorgensen  Would the patient rather a call back or a response via [facility]? call  Best Call Back Number:[phone]  Additional Information:

## 2023-09-07 NOTE — TELEPHONE ENCOUNTER
Spoke to pt. I explained that the medication he is requesting was sent on 09/05. Pt also asked about his wife's refills. I explained that that request was sent to the pt's provider and we are awaiting a response.

## 2023-09-07 NOTE — TELEPHONE ENCOUNTER
----- Message from Jalen Young sent at 9/7/2023  9:26 AM CDT -----  Type:  RX Refill Request    Who Called: pt  Refill or New Rx:refill  RX Name and Strength:metOLazone (ZAROXOLYN) 2.5 MG tablet  [facility] with phone [id number] [address] [place] [address]  Local or Mail Order:local  Ordering Provider:[facility] jorgensen  Would the patient rather a call back or a response via [facility]? call  Best Call Back Number:[phone]  Additional Information:

## 2023-09-14 RX ORDER — METHOCARBAMOL 500 MG/1
TABLET, FILM COATED ORAL
Qty: 45 TABLET | Refills: 1 | Status: SHIPPED | OUTPATIENT
Start: 2023-09-14

## 2023-09-25 ENCOUNTER — TELEPHONE (OUTPATIENT)
Dept: FAMILY MEDICINE | Facility: CLINIC | Age: 83
End: 2023-09-25
Payer: MEDICARE

## 2023-09-25 RX ORDER — LISINOPRIL 10 MG/1
10 TABLET ORAL DAILY
Qty: 90 TABLET | Refills: 3 | Status: SHIPPED | OUTPATIENT
Start: 2023-09-25

## 2023-09-25 NOTE — TELEPHONE ENCOUNTER
----- Message from Brandi Brar sent at 9/25/2023  9:03 AM CDT -----  Regarding: refill  Contact: [phone]  Type:  RX Refill Request    Who Called:  pt   Refill or New Rx: refill   RX Name and Strength: lisinopriL 10 MG tablet  How is the patient currently taking it? (ex. 1XDay): Take one tablet daily  Is this a 30 day or 90 day RX: 90/3 refills   Preferred Pharmacy with phone number:  [id number] - [address]  Local or Mail Order: local   Ordering Provider:   Would the patient rather a call back or a response via [facility]?  Call   Best Call Back Number: [phone]  Additional Information:

## 2023-09-30 ENCOUNTER — EXTERNAL CHRONIC CARE MANAGEMENT (OUTPATIENT)
Dept: PRIMARY CARE CLINIC | Facility: CLINIC | Age: 83
End: 2023-09-30
Payer: MEDICARE

## 2023-09-30 PROCEDURE — 99490 CHRNC CARE MGMT STAFF 1ST 20: CPT | Mod: S$GLB,,, | Performed by: FAMILY MEDICINE

## 2023-09-30 PROCEDURE — 99490 PR CHRONIC CARE MGMT, 1ST 20 MIN: ICD-10-PCS | Mod: S$GLB,,, | Performed by: FAMILY MEDICINE

## 2023-10-04 RX ORDER — AMITRIPTYLINE HYDROCHLORIDE 10 MG/1
TABLET, FILM COATED ORAL
Qty: 270 TABLET | Refills: 1 | Status: SHIPPED | OUTPATIENT
Start: 2023-10-04

## 2023-10-04 NOTE — TELEPHONE ENCOUNTER
Refill Decision Note   Teo Gloria  is requesting a refill authorization.  Brief Assessment and Rationale for Refill:  Approve     Medication Therapy Plan:         Pharmacist review requested: Yes   Extended chart review required: Yes   Comments:     Note composed:11:58 AM 10/04/2023             Appointments     Last Visit   2/20/2023 Fredis Hector MD   Next Visit   Visit date not found Fredis Hector MD

## 2023-10-04 NOTE — TELEPHONE ENCOUNTER
No care due was identified.  [facility] Embedded Care Due Messages. Reference number: [id number].   10/04/2023 9:31:39 AM CDT

## 2023-10-04 NOTE — TELEPHONE ENCOUNTER
Refill Routing Note   Medication(s) are not appropriate for processing by [facility] for the following reason(s):      Drug-disease interaction    [facility] action(s):  Defer Care Due:  None identified     Medication Therapy Plan: Geriatric Warning; amitriptyline and RBBB    Pharmacist review requested: Yes     Appointments  past 12m or future 3m with PCP    Date Provider   Last Visit   2/20/2023 Fredis Hector MD   Next Visit   Visit date not found Fredis Hector MD   ED visits in past 90 days: 0        Note composed:10:06 AM 10/04/2023

## 2023-10-12 ENCOUNTER — HOSPITAL ENCOUNTER (OUTPATIENT)
Dept: RADIOLOGY | Facility: HOSPITAL | Age: 83
Discharge: HOME | End: 2023-10-12
Attending: STUDENT IN AN ORGANIZED HEALTH CARE EDUCATION/TRAINING PROGRAM
Payer: MEDICARE

## 2023-10-12 ENCOUNTER — TELEPHONE (OUTPATIENT)
Dept: FAMILY MEDICINE | Facility: CLINIC | Age: 83
End: 2023-10-12

## 2023-10-12 ENCOUNTER — OFFICE VISIT (OUTPATIENT)
Dept: FAMILY MEDICINE | Facility: CLINIC | Age: 83
End: 2023-10-12
Payer: MEDICARE

## 2023-10-12 VITALS
BODY MASS INDEX: 30.27 KG/M2 | WEIGHT: 199.75 LBS | DIASTOLIC BLOOD PRESSURE: 68 MMHG | OXYGEN SATURATION: 95 % | HEART RATE: 90 BPM | HEIGHT: 68 IN | TEMPERATURE: 99 F | SYSTOLIC BLOOD PRESSURE: 124 MMHG

## 2023-10-12 DIAGNOSIS — M54.50 ACUTE MIDLINE LOW BACK PAIN WITHOUT SCIATICA: ICD-10-CM

## 2023-10-12 DIAGNOSIS — M54.50 ACUTE MIDLINE LOW BACK PAIN WITHOUT SCIATICA: Primary | ICD-10-CM

## 2023-10-12 PROCEDURE — 72100 X-RAY EXAM L-S SPINE 2/3 VWS: CPT | Mod: TC,FY,PN

## 2023-10-12 PROCEDURE — 72100 XR LUMBAR SPINE AP AND LATERAL: ICD-10-PCS | Mod: 26,,, | Performed by: RADIOLOGY

## 2023-10-12 PROCEDURE — 96372 PR INJECTION,THERAP/PROPH/DIAG2ST, IM OR SUBCUT: ICD-10-PCS | Mod: S$GLB,,, | Performed by: STUDENT IN AN ORGANIZED HEALTH CARE EDUCATION/TRAINING PROGRAM

## 2023-10-12 PROCEDURE — 99214 OFFICE O/P EST MOD 30 MIN: CPT | Mod: 25,S$GLB,, | Performed by: STUDENT IN AN ORGANIZED HEALTH CARE EDUCATION/TRAINING PROGRAM

## 2023-10-12 PROCEDURE — 99214 PR OFFICE/OUTPT VISIT, EST, LEVL IV, 30-39 MIN: ICD-10-PCS | Mod: 25,S$GLB,, | Performed by: STUDENT IN AN ORGANIZED HEALTH CARE EDUCATION/TRAINING PROGRAM

## 2023-10-12 PROCEDURE — 72100 X-RAY EXAM L-S SPINE 2/3 VWS: CPT | Mod: 26,,, | Performed by: RADIOLOGY

## 2023-10-12 PROCEDURE — 96372 THER/PROPH/DIAG INJ SC/IM: CPT | Mod: S$GLB,,, | Performed by: STUDENT IN AN ORGANIZED HEALTH CARE EDUCATION/TRAINING PROGRAM

## 2023-10-12 RX ORDER — KETOROLAC TROMETHAMINE 30 MG/ML
60 INJECTION, SOLUTION INTRAMUSCULAR; INTRAVENOUS ONCE
Status: COMPLETED | OUTPATIENT
Start: 2023-10-12 | End: 2023-10-12

## 2023-10-12 RX ORDER — METHYLPREDNISOLONE 4 MG/1
TABLET ORAL
Qty: 21 EACH | Refills: 0 | Status: SHIPPED | OUTPATIENT
Start: 2023-10-12 | End: 2023-11-02

## 2023-10-12 RX ADMIN — KETOROLAC TROMETHAMINE 60 MG: 30 INJECTION, SOLUTION INTRAMUSCULAR; INTRAVENOUS at 01:10

## 2023-10-12 NOTE — TELEPHONE ENCOUNTER
----- Message from Catina Mott sent at 10/12/2023  8:48 AM CDT -----  Type:  Same Day Appointment Request    Caller is requesting a same day appointment.  Caller declined first available appointment listed below.    Name of Caller: Teo Gloria  When is the first available appointment? N/A  Symptoms: Body pain/can hardly walk  Best Call Back : [phone]  Additional Information:  Pt would like to be seen as soon as possible.

## 2023-10-27 ENCOUNTER — TELEPHONE (OUTPATIENT)
Dept: FAMILY MEDICINE | Facility: CLINIC | Age: 83
End: 2023-10-27
Payer: MEDICARE

## 2023-10-27 NOTE — TELEPHONE ENCOUNTER
----- Message from Wu Rey MD sent at 10/27/2023  2:37 PM CDT -----  No fracture. 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

## 2023-10-31 ENCOUNTER — EXTERNAL CHRONIC CARE MANAGEMENT (OUTPATIENT)
Dept: PRIMARY CARE CLINIC | Facility: CLINIC | Age: 83
End: 2023-10-31
Payer: MEDICARE

## 2023-10-31 PROCEDURE — 99490 CHRNC CARE MGMT STAFF 1ST 20: CPT | Mod: S$GLB,,, | Performed by: FAMILY MEDICINE

## 2023-10-31 PROCEDURE — 99490 PR CHRONIC CARE MGMT, 1ST 20 MIN: ICD-10-PCS | Mod: S$GLB,,, | Performed by: FAMILY MEDICINE

## 2023-11-04 NOTE — PROGRESS NOTES
Patient ID: Teo Gloria is a 83 y.o. male.     Chief Complaint: Back Pain    Back Pain  This is a recurrent problem. The current episode started 1 to 4 weeks ago. The problem occurs constantly. The problem is unchanged. The pain is present in the lumbar spine. The quality of the pain is described as aching. The pain does not radiate. The pain is at a severity of 8/10. The pain is severe. The symptoms are aggravated by bending, position and twisting. Pertinent negatives include no bladder incontinence, bowel incontinence, chest pain, fever, headaches, leg pain, numbness, paresis, paresthesias or weakness. He has tried analgesics, heat and NSAIDs for the symptoms. The treatment provided no relief.          Review of Systems  Review of Systems   Constitutional:  Negative for fever.   HENT:  Negative for ear pain and sinus pain.    Eyes:  Negative for discharge.   Respiratory:  Negative for cough and shortness of breath.    Cardiovascular:  Negative for chest pain and leg swelling.   Gastrointestinal:  Negative for bowel incontinence, diarrhea, nausea and vomiting.   Genitourinary:  Negative for bladder incontinence and urgency.   Musculoskeletal:  Positive for back pain. Negative for myalgias.   Skin:  Negative for rash.   Neurological:  Negative for weakness, numbness, headaches and paresthesias.   Psychiatric/Behavioral:  Negative for depression.    All other systems reviewed and are negative.      Currently Medications  Current Outpatient Medications on File Prior to Visit   Medication Sig Dispense Refill    abiraterone (ZYTIGA) 250 mg Tab 250 mg 4 tablets in the morning.      amitriptyline (ELAVIL) 10 MG tablet TAKE 1-3 TABLETS BY MOUTH AT BEDTIME FOR SLEEP 270 tablet 1    aspirin (ECOTRIN) 81 MG EC tablet Take 81 mg by mouth once daily.      gabapentin (NEURONTIN) 100 MG capsule One to three tablets each night. 270 capsule 3    lisinopriL 10 MG tablet Take 1 tablet (10 mg total) by mouth once daily. 90 tablet  "3    methocarbamoL (ROBAXIN) 500 MG Tab 1 tablet p.o. q.6 hours as needed for muscle spasm. 45 tablet 1    metOLazone (ZAROXOLYN) 2.5 MG tablet Take 1 tablet (2.5 mg total) by mouth once daily. For 7 days may repeat is continues to have lower leg edema ok to take with furosemide (Patient not taking: Reported on 10/12/2023) 90 tablet 0    metoprolol succinate (TOPROL-XL) 25 MG 24 hr tablet Take 0.5 tablets (12.5 mg total) by mouth once daily. (Patient not taking: Reported on 10/12/2023) 45 tablet 11     No current facility-administered medications on file prior to visit.       Physical  Exam  Vitals:    10/12/23 1321   BP: 124/68   BP Location: Right arm   Patient Position: Sitting   Pulse: 90   Temp: 98.5 °F (36.9 °C)   TempSrc: Oral   SpO2: 95%   Weight: 90.6 kg (199 lb 11.8 oz)   Height: 5' 8" (1.727 m)      Body mass index is 30.37 kg/m².  Wt Readings from Last 3 Encounters:   10/12/23 90.6 kg (199 lb 11.8 oz)   09/06/22 94.4 kg (208 lb 3.6 oz)   08/17/22 92 kg (202 lb 13.2 oz)       Physical Exam  Vitals and nursing note reviewed.   Constitutional:       General: He is not in acute distress.     Appearance: He is not ill-appearing.   HENT:      Head: Normocephalic and atraumatic.      Right Ear: External ear normal.      Left Ear: External ear normal.      Nose: Nose normal.      Mouth/Throat:      Mouth: Mucous membranes are moist.   Eyes:      Extraocular Movements: Extraocular movements intact.      Conjunctiva/sclera: Conjunctivae normal.   Cardiovascular:      Rate and Rhythm: Normal rate and regular rhythm.      Pulses: Normal pulses.      Heart sounds: No murmur heard.  Pulmonary:      Effort: Pulmonary effort is normal. No respiratory distress.      Breath sounds: No wheezing.   Abdominal:      General: There is no distension.      Palpations: Abdomen is soft. There is no mass.      Tenderness: There is no abdominal tenderness.   Musculoskeletal:         General: No swelling.      Cervical back: Normal " "range of motion.      Lumbar back: Tenderness present.   Skin:     Coloration: Skin is not jaundiced.      Findings: No rash.   Neurological:      General: No focal deficit present.      Mental Status: He is alert and oriented to person, place, and time.   Psychiatric:         Mood and Affect: Mood normal.         Thought Content: Thought content normal.         Labs:    Complete Blood Count  Lab Results   Component Value Date    RBC 4.13 (L) 02/20/2023    HGB 12.3 (L) 02/20/2023    HCT 37.9 (L) 02/20/2023    MCV 92 02/20/2023    MCH 29.8 02/20/2023    MCHC 32.5 02/20/2023    RDW 13.0 02/20/2023     02/20/2023    MPV 10.5 02/20/2023    GRAN 6.1 02/20/2023    GRAN 67.9 02/20/2023    LYMPH 1.4 02/20/2023    LYMPH 15.0 (L) 02/20/2023    MONO 1.0 02/20/2023    MONO 10.7 02/20/2023    EOS 0.4 02/20/2023    BASO 0.07 02/20/2023    EOSINOPHIL 4.3 02/20/2023    BASOPHIL 0.8 02/20/2023    DIFFMETHOD Automated 02/20/2023       Comprehensive Metabolic Panel  No results found for: "GLU", "BUN", "CREATININE", "NA", "K", "CL", "PROT", "ALBUMIN", "BILITOT", "AST", "ALKPHOS", "CO2", "ALT", "ANIONGAP", "EGFRNONAA", "ESTGFRAFRICA"    TSH  No results found for: "TSH"    Imaging:  X-Ray Lumbar Spine AP And Lateral  Narrative: EXAMINATION:  XR LUMBAR SPINE AP AND LATERAL    CLINICAL HISTORY:  Low back pain, unspecified    TECHNIQUE:  AP, lateral and spot images were performed of the lumbar spine.    COMPARISON:  None    FINDINGS:  Comparison is made to MRI lumbar spine April 1, 2022.  Vertebral body heights are maintained.  Mild degenerative disc disease.  Levoscoliosis lumbar spine.  Moderate to severe lower lumbar facet arthropathy.  Arterial atherosclerotic disease.  Osteopenia.  Impression: No acute finding.  Multilevel degenerative changes as above.  Osteopenia.    Electronically signed by: Donnie Harkins  Date:    10/12/2023  Time:    15:16      Assessment/Plan:    1. Acute midline low back pain without sciatica  -     " X-Ray Lumbar Spine AP And Lateral; Future; Expected date: 10/12/2023  -     methylPREDNISolone (MEDROL DOSEPACK) 4 mg tablet; use as directed  Dispense: 21 each; Refill: 0  -     ketorolac injection 60 mg         Discussed how to stay healthy including: diet, exercise, refraining from smoking and discussed screening exams / tests needed for age, sex and family Hx.        Wu Rey MD

## 2023-11-20 ENCOUNTER — TELEPHONE (OUTPATIENT)
Dept: FAMILY MEDICINE | Facility: CLINIC | Age: 83
End: 2023-11-20
Payer: MEDICARE

## 2023-11-21 ENCOUNTER — OFFICE VISIT (OUTPATIENT)
Dept: FAMILY MEDICINE | Facility: CLINIC | Age: 83
End: 2023-11-21
Payer: MEDICARE

## 2023-11-21 VITALS
HEIGHT: 68 IN | TEMPERATURE: 99 F | SYSTOLIC BLOOD PRESSURE: 132 MMHG | DIASTOLIC BLOOD PRESSURE: 80 MMHG | BODY MASS INDEX: 30.47 KG/M2 | OXYGEN SATURATION: 98 % | WEIGHT: 201.06 LBS | HEART RATE: 75 BPM

## 2023-11-21 DIAGNOSIS — M54.6 ACUTE BILATERAL THORACIC BACK PAIN: ICD-10-CM

## 2023-11-21 DIAGNOSIS — I70.0 AORTIC ATHEROSCLEROSIS: ICD-10-CM

## 2023-11-21 DIAGNOSIS — M54.50 ACUTE MIDLINE LOW BACK PAIN WITHOUT SCIATICA: Primary | ICD-10-CM

## 2023-11-21 DIAGNOSIS — I48.0 PAROXYSMAL ATRIAL FIBRILLATION: ICD-10-CM

## 2023-11-21 PROCEDURE — 99214 OFFICE O/P EST MOD 30 MIN: CPT | Mod: S$GLB,,, | Performed by: STUDENT IN AN ORGANIZED HEALTH CARE EDUCATION/TRAINING PROGRAM

## 2023-11-21 PROCEDURE — 99214 PR OFFICE/OUTPT VISIT, EST, LEVL IV, 30-39 MIN: ICD-10-PCS | Mod: S$GLB,,, | Performed by: STUDENT IN AN ORGANIZED HEALTH CARE EDUCATION/TRAINING PROGRAM

## 2023-11-21 RX ORDER — TRAMADOL HYDROCHLORIDE 50 MG/1
50 TABLET ORAL EVERY 6 HOURS PRN
Qty: 60 TABLET | Refills: 0 | Status: SHIPPED | OUTPATIENT
Start: 2023-11-21 | End: 2024-01-22

## 2023-11-21 RX ORDER — TRAMADOL HYDROCHLORIDE 50 MG/1
50 TABLET ORAL EVERY 6 HOURS PRN
COMMUNITY
Start: 2023-09-23 | End: 2023-11-21 | Stop reason: SDUPTHER

## 2023-11-21 RX ORDER — CYCLOBENZAPRINE HCL 10 MG
10 TABLET ORAL 3 TIMES DAILY PRN
COMMUNITY

## 2023-11-22 NOTE — PROGRESS NOTES
Patient ID: Teo Gloria is a 83 y.o. male.     Chief Complaint: Back Pain    Back Pain  This is a new problem. The current episode started 1 to 4 weeks ago. The problem occurs constantly. The problem has been gradually improving since onset. The pain is present in the thoracic spine. The quality of the pain is described as aching. The pain does not radiate. The pain is moderate. The pain is Worse during the night. The symptoms are aggravated by lying down and position. Stiffness is present In the morning. Pertinent negatives include no bladder incontinence, bowel incontinence, chest pain, fever, headaches, leg pain, numbness, paresis, perianal numbness, tingling or weakness. Risk factors: recent fall. He has tried muscle relaxant, heat and analgesics for the symptoms. The treatment provided no relief.   Patient recently seen at [facility]. He was told that this si musculoskeletal pain. He was given muscle relaxer but has not had improvement of the pain.        Review of Systems  Review of Systems   Constitutional:  Negative for fever.   HENT:  Negative for ear pain and sinus pain.    Eyes:  Negative for discharge.   Respiratory:  Negative for cough and shortness of breath.    Cardiovascular:  Negative for chest pain and leg swelling.   Gastrointestinal:  Negative for bowel incontinence, diarrhea, nausea and vomiting.   Genitourinary:  Negative for bladder incontinence and urgency.   Musculoskeletal:  Positive for back pain. Negative for myalgias.   Skin:  Negative for rash.   Neurological:  Negative for tingling, weakness, numbness and headaches.   Psychiatric/Behavioral:  Negative for depression.    All other systems reviewed and are negative.      Currently Medications  Current Outpatient Medications on File Prior to Visit   Medication Sig Dispense Refill    abiraterone (ZYTIGA) 250 mg Tab 250 mg 4 tablets in the morning.      amitriptyline (ELAVIL) 10 MG tablet TAKE 1-3 TABLETS BY MOUTH AT BEDTIME FOR SLEEP  "270 tablet 1    aspirin (ECOTRIN) 81 MG EC tablet Take 81 mg by mouth once daily.      cyclobenzaprine (FLEXERIL) 10 MG tablet Take 10 mg by mouth 3 (three) times daily as needed for Muscle spasms.      lisinopriL 10 MG tablet Take 1 tablet (10 mg total) by mouth once daily. 90 tablet 3    methocarbamoL (ROBAXIN) 500 MG Tab 1 tablet p.o. q.6 hours as needed for muscle spasm. 45 tablet 1    gabapentin (NEURONTIN) 100 MG capsule One to three tablets each night. (Patient not taking: Reported on 11/21/2023) 270 capsule 3    metOLazone (ZAROXOLYN) 2.5 MG tablet Take 1 tablet (2.5 mg total) by mouth once daily. For 7 days may repeat is continues to have lower leg edema ok to take with furosemide (Patient not taking: Reported on 11/21/2023) 90 tablet 0    metoprolol succinate (TOPROL-XL) 25 MG 24 hr tablet Take 0.5 tablets (12.5 mg total) by mouth once daily. (Patient not taking: Reported on 10/12/2023) 45 tablet 11    [DISCONTINUED] traMADoL (ULTRAM) 50 mg tablet Take 50 mg by mouth every 6 (six) hours as needed.       No current facility-administered medications on file prior to visit.       Physical  Exam  Vitals:    11/21/23 1539   BP: 132/80   BP Location: Right arm   Patient Position: Sitting   Pulse: 75   Temp: 98.7 °F (37.1 °C)   TempSrc: Oral   SpO2: 98%   Weight: 91.2 kg (201 lb 1 oz)   Height: 5' 8" (1.727 m)      Body mass index is 30.57 kg/m².  Wt Readings from Last 3 Encounters:   11/21/23 91.2 kg (201 lb 1 oz)   10/12/23 90.6 kg (199 lb 11.8 oz)   09/06/22 94.4 kg (208 lb 3.6 oz)       Physical Exam  Vitals and nursing note reviewed.   Constitutional:       General: He is not in acute distress.     Appearance: He is not ill-appearing.   HENT:      Head: Normocephalic and atraumatic.      Right Ear: External ear normal.      Left Ear: External ear normal.      Nose: Nose normal.      Mouth/Throat:      Mouth: Mucous membranes are moist.   Eyes:      Extraocular Movements: Extraocular movements intact.      " "Conjunctiva/sclera: Conjunctivae normal.   Cardiovascular:      Rate and Rhythm: Normal rate and regular rhythm.      Pulses: Normal pulses.      Heart sounds: No murmur heard.  Pulmonary:      Effort: Pulmonary effort is normal. No respiratory distress.      Breath sounds: No wheezing.   Abdominal:      General: There is no distension.      Palpations: Abdomen is soft. There is no mass.      Tenderness: There is no abdominal tenderness.   Musculoskeletal:         General: No swelling.      Cervical back: Normal range of motion.   Skin:     Coloration: Skin is not jaundiced.      Findings: No rash.   Neurological:      General: No focal deficit present.      Mental Status: He is alert and oriented to person, place, and time.   Psychiatric:         Mood and Affect: Mood normal.         Thought Content: Thought content normal.         Labs:    Complete Blood Count  Lab Results   Component Value Date    RBC 4.13 (L) 02/20/2023    HGB 12.3 (L) 02/20/2023    HCT 37.9 (L) 02/20/2023    MCV 92 02/20/2023    MCH 29.8 02/20/2023    MCHC 32.5 02/20/2023    RDW 13.0 02/20/2023     02/20/2023    MPV 10.5 02/20/2023    GRAN 6.1 02/20/2023    GRAN 67.9 02/20/2023    LYMPH 1.4 02/20/2023    LYMPH 15.0 (L) 02/20/2023    MONO 1.0 02/20/2023    MONO 10.7 02/20/2023    EOS 0.4 02/20/2023    BASO 0.07 02/20/2023    EOSINOPHIL 4.3 02/20/2023    BASOPHIL 0.8 02/20/2023    DIFFMETHOD Automated 02/20/2023       Comprehensive Metabolic Panel  No results found for: "GLU", "BUN", "CREATININE", "NA", "K", "CL", "PROT", "ALBUMIN", "BILITOT", "AST", "ALKPHOS", "CO2", "ALT", "ANIONGAP", "EGFRNONAA", "ESTGFRAFRICA"    TSH  No results found for: "TSH"    Imaging:  X-Ray Lumbar Spine AP And Lateral  Narrative: EXAMINATION:  XR LUMBAR SPINE AP AND LATERAL    CLINICAL HISTORY:  Low back pain, unspecified    TECHNIQUE:  AP, lateral and spot images were performed of the lumbar spine.    COMPARISON:  None    FINDINGS:  Comparison is made to MRI " lumbar spine April 1, 2022.  Vertebral body heights are maintained.  Mild degenerative disc disease.  Levoscoliosis lumbar spine.  Moderate to severe lower lumbar facet arthropathy.  Arterial atherosclerotic disease.  Osteopenia.  Impression: No acute finding.  Multilevel degenerative changes as above.  Osteopenia.    Electronically signed by: Donnie Harkins  Date:    10/12/2023  Time:    15:16      Assessment/Plan:    1. Acute midline low back pain without sciatica    2. Acute bilateral thoracic back pain  -     traMADoL (ULTRAM) 50 mg tablet; Take 1 tablet (50 mg total) by mouth every 6 (six) hours as needed for Pain (back pain).  Dispense: 60 tablet; Refill: 0    3. Paroxysmal atrial fibrillation  Assessment & Plan:  - stable  - asymptomatic  - continue current meds      4. Aortic atherosclerosis  Assessment & Plan:  - stable  - continue current meds           Discussed how to stay healthy including: diet, exercise, refraining from smoking and discussed screening exams / tests needed for age, sex and family Hx.      Wu Rey MD

## 2023-11-24 ENCOUNTER — TELEPHONE (OUTPATIENT)
Dept: FAMILY MEDICINE | Facility: CLINIC | Age: 83
End: 2023-11-24
Payer: MEDICARE

## 2023-11-24 NOTE — TELEPHONE ENCOUNTER
I spoke with dr wilson and notified bryan gallegos  Called in [facility]   Tramadol 50 mg 1 Q6 hours #20

## 2023-11-24 NOTE — TELEPHONE ENCOUNTER
----- Message from Soni Jones sent at 11/24/2023 10:48 AM CST -----  Type:  Needs Medical Advice    Who Called: Pt Christo Chance   Symptoms (please be specific): pt went to ER last night and found out he has  broken ribs was given medication,  maloxicam  not seeming to work.  Pt wants something else called in, still in a lot of pain  please call to discuss    Pharmacy name and phone #:  [id number] [address]   Phone: [phone]  Fax: [phone]        Would the patient rather a call back or a response via [facility]? call  Best Call Back Number: [phone]  Additional Information:

## 2023-11-30 ENCOUNTER — EXTERNAL CHRONIC CARE MANAGEMENT (OUTPATIENT)
Dept: PRIMARY CARE CLINIC | Facility: CLINIC | Age: 83
End: 2023-11-30
Payer: MEDICARE

## 2023-11-30 PROCEDURE — 99439 CHRNC CARE MGMT STAF EA ADDL: CPT | Mod: S$GLB,,, | Performed by: FAMILY MEDICINE

## 2023-11-30 PROCEDURE — 99439 PR CHRONIC CARE MGMT, EA ADDTL 20 MIN: ICD-10-PCS | Mod: S$GLB,,, | Performed by: FAMILY MEDICINE

## 2023-11-30 PROCEDURE — 99490 CHRNC CARE MGMT STAFF 1ST 20: CPT | Mod: S$GLB,,, | Performed by: FAMILY MEDICINE

## 2023-11-30 PROCEDURE — 99490 PR CHRONIC CARE MGMT, 1ST 20 MIN: ICD-10-PCS | Mod: S$GLB,,, | Performed by: FAMILY MEDICINE

## 2023-12-31 ENCOUNTER — EXTERNAL CHRONIC CARE MANAGEMENT (OUTPATIENT)
Dept: PRIMARY CARE CLINIC | Facility: CLINIC | Age: 83
End: 2023-12-31
Payer: MEDICARE

## 2023-12-31 PROCEDURE — 99490 CHRNC CARE MGMT STAFF 1ST 20: CPT | Mod: S$GLB,,, | Performed by: FAMILY MEDICINE

## 2024-01-22 ENCOUNTER — OFFICE VISIT (OUTPATIENT)
Dept: FAMILY MEDICINE | Facility: CLINIC | Age: 84
End: 2024-01-22
Payer: MEDICARE

## 2024-01-22 ENCOUNTER — TELEPHONE (OUTPATIENT)
Dept: FAMILY MEDICINE | Facility: CLINIC | Age: 84
End: 2024-01-22

## 2024-01-22 VITALS
BODY MASS INDEX: 30.79 KG/M2 | WEIGHT: 203.13 LBS | SYSTOLIC BLOOD PRESSURE: 152 MMHG | OXYGEN SATURATION: 97 % | HEART RATE: 80 BPM | DIASTOLIC BLOOD PRESSURE: 78 MMHG | TEMPERATURE: 99 F | HEIGHT: 68 IN

## 2024-01-22 DIAGNOSIS — I10 ESSENTIAL HYPERTENSION: ICD-10-CM

## 2024-01-22 DIAGNOSIS — I48.0 PAROXYSMAL ATRIAL FIBRILLATION: ICD-10-CM

## 2024-01-22 DIAGNOSIS — D69.2 OTHER NONTHROMBOCYTOPENIC PURPURA: ICD-10-CM

## 2024-01-22 DIAGNOSIS — I70.0 AORTIC ATHEROSCLEROSIS: ICD-10-CM

## 2024-01-22 DIAGNOSIS — C61 PROSTATE CANCER: Primary | ICD-10-CM

## 2024-01-22 DIAGNOSIS — R60.0 LOWER LEG EDEMA: ICD-10-CM

## 2024-01-22 PROCEDURE — 99214 OFFICE O/P EST MOD 30 MIN: CPT | Mod: S$GLB,,, | Performed by: FAMILY MEDICINE

## 2024-01-22 RX ORDER — POTASSIUM CHLORIDE 20 MEQ/1
20 TABLET, EXTENDED RELEASE ORAL DAILY
Qty: 30 TABLET | Refills: 1 | Status: SHIPPED | OUTPATIENT
Start: 2024-01-22

## 2024-01-22 RX ORDER — FUROSEMIDE 40 MG/1
40 TABLET ORAL DAILY
Qty: 30 TABLET | Refills: 2 | Status: SHIPPED | OUTPATIENT
Start: 2024-01-22 | End: 2025-01-21

## 2024-01-22 NOTE — TELEPHONE ENCOUNTER
----- Message from Justina Boudreaux sent at 1/22/2024  8:42 AM CST -----  Type:  Same Day Appointment Request    Caller is requesting a same day appointment.  Caller declined first available appointment listed below.    Name of Caller:patient  When is the first available appointment?  Symptoms: He has fluid building up in legs in feet.       Best Call Back Number:[phone]  Additional Information: Same day request

## 2024-01-28 PROBLEM — C61 PROSTATE CANCER: Status: ACTIVE | Noted: 2024-01-28

## 2024-01-28 PROBLEM — D69.2 OTHER NONTHROMBOCYTOPENIC PURPURA: Status: ACTIVE | Noted: 2024-01-28

## 2024-01-29 NOTE — PROGRESS NOTES
" Patient ID: Teo Gloria is a 83 y.o. male.    Chief Complaint: Foot Swelling (/), hand swelling, blew nose had blood, and Headache    HPI      Teo Gloria is a 83 y.o. male here because of bilateral feet swelling.  Also hand swelling.  Complains of fun blood in nasal discharge nose when he was blowing his nose-mild headache   Major complaint is bilateral Fleet swelling.  Not taking Lasix medications this time.  No complaints of urinary problems.    Vitals:    01/22/24 1551 01/22/24 1654   BP: (!) 156/76 (!) 152/78   BP Location: Left arm Left arm   Patient Position: Sitting Sitting   Pulse: 80    Temp: 99.1 °F (37.3 °C)    TempSrc: Oral    SpO2: 97%    Weight: 92.1 kg (203 lb 2.5 oz)    Height: 5' 8" (1.727 m)             Review of Symptoms      Physical Exam    Constitutional:  Oriented to person, place, and time.appears well-developed and well-nourished.  No distress.      HENT  Head: Normocephalic and atraumatic  Right Ear: External ear normal.   Left Ear: External ear normal.   Nose: External nose normal.   Mouth:  Moist mucus membranes.    Eyes:  Conjunctivae are normal. Right eye exhibits no discharge.  Left eye exhibits no discharge. No scleral icterus.  No periorbital edema    Cardiovascular:  Regular rate and rhythm with normal S1 and S2     Pulmonary/Chest:   Clear to auscultation bilaterally without wheezes, rhonchi or rales      Musculoskeletal:  Bilateral leg edema 3+ No obvious deformity No wasting       Neurological:  Alert and oriented to person, place, and time.   Coordination normal.     Skin:   Skin is warm and dry.  No diaphoresis.   No rash noted.     Psychiatric: Normal mood and affect. Behavior is normal.  Judgment and thought content normal.     Complete Blood Count  Lab Results   Component Value Date    RBC 4.13 (L) 02/20/2023    HGB 12.3 (L) 02/20/2023    HCT 37.9 (L) 02/20/2023    MCV 92 02/20/2023    MCH 29.8 02/20/2023    MCHC 32.5 02/20/2023    RDW 13.0 02/20/2023     " "02/20/2023    MPV 10.5 02/20/2023    GRAN 6.1 02/20/2023    GRAN 67.9 02/20/2023    LYMPH 1.4 02/20/2023    LYMPH 15.0 (L) 02/20/2023    MONO 1.0 02/20/2023    MONO 10.7 02/20/2023    EOS 0.4 02/20/2023    BASO 0.07 02/20/2023    EOSINOPHIL 4.3 02/20/2023    BASOPHIL 0.8 02/20/2023    DIFFMETHOD Automated 02/20/2023       Comprehensive Metabolic Panel  No results found for: "GLU", "BUN", "CREATININE", "NA", "K", "CL", "PROT", "ALBUMIN", "BILITOT", "AST", "ALKPHOS", "CO2", "ALT", "ANIONGAP", "EGFRNONAA", "ESTGFRAFRICA"    TSH  No results found for: "TSH"    Assessment / Plan:      ICD-10-CM ICD-9-CM   1. Prostate cancer  C61 185   2. Other nonthrombocytopenic purpura  D69.2 287.2   3. Paroxysmal atrial fibrillation  I48.0 427.31   4. Aortic atherosclerosis  I70.0 440.0   5. Essential hypertension  I10 401.9   6. Lower leg edema  R60.0 782.3     Prostate cancer    Other nonthrombocytopenic purpura    Paroxysmal atrial fibrillation    Aortic atherosclerosis    Essential hypertension    Lower leg edema    Other orders  -     furosemide (LASIX) 40 MG tablet; Take 1 tablet (40 mg total) by mouth once daily. For  2 weeks and may repeat  Dispense: 30 tablet; Refill: 2  -     potassium chloride SA (K-DUR,KLOR-CON) 20 MEQ tablet; Take 1 tablet (20 mEq total) by mouth once daily. When taking lasix  Dispense: 30 tablet; Refill: 1    Lasix for lower leg edema potassium with it  Hypertension nearly controlled  Prostate cancer with hormone blocking agent lizama-tolerating well        "

## 2024-01-31 ENCOUNTER — EXTERNAL CHRONIC CARE MANAGEMENT (OUTPATIENT)
Dept: PRIMARY CARE CLINIC | Facility: CLINIC | Age: 84
End: 2024-01-31
Payer: MEDICARE

## 2024-01-31 PROCEDURE — 99490 CHRNC CARE MGMT STAFF 1ST 20: CPT | Mod: S$GLB,,, | Performed by: FAMILY MEDICINE

## 2024-01-31 NOTE — TELEPHONE ENCOUNTER
I have reached out to see if pt check bp home. Pt stated yesterday his bp was 152/54. I have scheduled pt for tomorrow for bp check. Pt confirmed appt.

## 2024-02-01 ENCOUNTER — CLINICAL SUPPORT (OUTPATIENT)
Dept: FAMILY MEDICINE | Facility: CLINIC | Age: 84
End: 2024-02-01
Payer: MEDICARE

## 2024-02-01 ENCOUNTER — TELEPHONE (OUTPATIENT)
Dept: FAMILY MEDICINE | Facility: CLINIC | Age: 84
End: 2024-02-01

## 2024-02-01 VITALS — OXYGEN SATURATION: 94 % | SYSTOLIC BLOOD PRESSURE: 136 MMHG | HEART RATE: 72 BPM | DIASTOLIC BLOOD PRESSURE: 72 MMHG

## 2024-02-01 DIAGNOSIS — Z01.30 BP CHECK: Primary | ICD-10-CM

## 2024-02-01 NOTE — PROGRESS NOTES
Teo Gloria 83 y.o. male is here today for Blood Pressure check.   History of HTN yes.    Review of patient's allergies indicates:   Allergen Reactions    Benadryl [diphenhydramine hcl] Hives    Clindamycin Hives    Trazodone      Diarrhea      Xarelto [rivaroxaban] Swelling     Knee swelling    Hydrocodone-acetaminophen Rash     Creatinine   Date Value Ref Range Status   02/20/2023 0.96 0.50 - 1.40 mg/dL Final     Sodium   Date Value Ref Range Status   02/20/2023 137 136 - 145 mmol/L Final     Potassium   Date Value Ref Range Status   02/20/2023 3.8 3.5 - 5.1 mmol/L Final   ]  Patient verifies taking blood pressure medications on a regular basis at the same time of the day.     Current Outpatient Medications:     abiraterone (ZYTIGA) 250 mg Tab, 250 mg 4 tablets in the morning., Disp: , Rfl:     amitriptyline (ELAVIL) 10 MG tablet, TAKE 1-3 TABLETS BY MOUTH AT BEDTIME FOR SLEEP, Disp: 270 tablet, Rfl: 1    aspirin (ECOTRIN) 81 MG EC tablet, Take 81 mg by mouth once daily., Disp: , Rfl:     cyclobenzaprine (FLEXERIL) 10 MG tablet, Take 10 mg by mouth 3 (three) times daily as needed for Muscle spasms., Disp: , Rfl:     furosemide (LASIX) 40 MG tablet, Take 1 tablet (40 mg total) by mouth once daily. For  2 weeks and may repeat, Disp: 30 tablet, Rfl: 2    gabapentin (NEURONTIN) 100 MG capsule, One to three tablets each night., Disp: 270 capsule, Rfl: 3    lisinopriL 10 MG tablet, Take 1 tablet (10 mg total) by mouth once daily., Disp: 90 tablet, Rfl: 3    methocarbamoL (ROBAXIN) 500 MG Tab, 1 tablet p.o. q.6 hours as needed for muscle spasm., Disp: 45 tablet, Rfl: 1    potassium chloride SA (K-DUR,KLOR-CON) 20 MEQ tablet, Take 1 tablet (20 mEq total) by mouth once daily. When taking lasix, Disp: 30 tablet, Rfl: 1  Does patient have record of home blood pressure readings no.   Last dose of blood pressure medication was taken at 8:30 am.  Patient is asymptomatic.       BP: 136/72 , Pulse: 72 .    Dr. Jarvis  notified.

## 2024-02-01 NOTE — TELEPHONE ENCOUNTER
Pt came in for BP check. Reading is as follows:    BP: 136/72 P: 72 O2: 94%    Pt is asymptomatic and stated that since beginning Furosemide, he feels much better.

## 2024-02-14 ENCOUNTER — TELEPHONE (OUTPATIENT)
Dept: FAMILY MEDICINE | Facility: CLINIC | Age: 84
End: 2024-02-14

## 2024-02-14 NOTE — TELEPHONE ENCOUNTER
----- Message from Soni Jones sent at 2/14/2024  8:41 AM CST -----  Type:  Sooner Apoointment Request    Caller is requesting a sooner appointment.  Caller declined first available appointment listed below.  Caller will not accept being placed on the waitlist and is requesting a message be sent to doctor.  Name of Caller:pt  When is the first available appointment?2/16  Symptoms:pt needs to get in right away today please call and schedule as soon as you can, fingers and arm is swollen, was told by Dr stop taking fluid pills that was helping drain the fluid now in severe pain needs a call right back to get in today   Would the patient rather a call back or a response via [facility]? call  Best Call Back Number:[phone]  Additional Information:

## 2024-02-14 NOTE — TELEPHONE ENCOUNTER
----- Message from Ewa White sent at 2/14/2024 11:07 AM CST -----  Type:  Sooner Apoointment Request/ call back    Caller is requesting a sooner appointment.  Caller declined first available appointment listed below.  Caller will not accept being placed on the waitlist and is requesting a message be sent to doctor.  Name of Caller: pt  When is the first available appointment? Pt have an appt today  Symptoms: freezing, swelling in right hand (can't move two of fingers) ; fluid building up in legs  Would the patient rather a call back or [facility]? call  Best Call Back Number: [phone]  Additional Information:  pt said he needs to be seen sooner than 2pm today due to symptoms listed above

## 2024-02-19 ENCOUNTER — TELEPHONE (OUTPATIENT)
Dept: FAMILY MEDICINE | Facility: CLINIC | Age: 84
End: 2024-02-19
Payer: MEDICARE

## 2024-02-19 ENCOUNTER — PATIENT OUTREACH (OUTPATIENT)
Dept: FAMILY MEDICINE | Facility: CLINIC | Age: 84
End: 2024-02-19

## 2024-02-19 ENCOUNTER — OFFICE VISIT (OUTPATIENT)
Dept: FAMILY MEDICINE | Facility: CLINIC | Age: 84
End: 2024-02-19
Payer: MEDICARE

## 2024-02-19 VITALS
TEMPERATURE: 98 F | WEIGHT: 199.5 LBS | DIASTOLIC BLOOD PRESSURE: 82 MMHG | HEART RATE: 72 BPM | SYSTOLIC BLOOD PRESSURE: 134 MMHG | HEIGHT: 68 IN | OXYGEN SATURATION: 95 % | BODY MASS INDEX: 30.23 KG/M2

## 2024-02-19 DIAGNOSIS — M77.9 INFLAMMATION AROUND JOINT: ICD-10-CM

## 2024-02-19 DIAGNOSIS — M19.90 ARTHRITIS: ICD-10-CM

## 2024-02-19 DIAGNOSIS — M54.50 ACUTE MIDLINE LOW BACK PAIN WITHOUT SCIATICA: Primary | ICD-10-CM

## 2024-02-19 PROCEDURE — 99213 OFFICE O/P EST LOW 20 MIN: CPT | Mod: 25,S$GLB,, | Performed by: FAMILY MEDICINE

## 2024-02-19 PROCEDURE — 96372 THER/PROPH/DIAG INJ SC/IM: CPT | Mod: S$GLB,,, | Performed by: FAMILY MEDICINE

## 2024-02-19 RX ORDER — PREDNISONE 20 MG/1
TABLET ORAL
Qty: 5 TABLET | Refills: 0 | Status: SHIPPED | OUTPATIENT
Start: 2024-02-19

## 2024-02-19 RX ORDER — OXYCODONE HYDROCHLORIDE 5 MG/1
5 TABLET ORAL EVERY 4 HOURS PRN
Qty: 15 TABLET | Refills: 0 | Status: SHIPPED | OUTPATIENT
Start: 2024-02-19 | End: 2024-02-26

## 2024-02-19 RX ORDER — CEPHALEXIN 500 MG/1
500 CAPSULE ORAL 4 TIMES DAILY
COMMUNITY
Start: 2024-02-15

## 2024-02-19 RX ORDER — PREDNISONE 5 MG/1
5 TABLET ORAL
COMMUNITY
Start: 2024-01-31 | End: 2024-02-19

## 2024-02-19 RX ORDER — TRIAMCINOLONE ACETONIDE 40 MG/ML
80 INJECTION, SUSPENSION INTRA-ARTICULAR; INTRAMUSCULAR ONCE
Status: COMPLETED | OUTPATIENT
Start: 2024-02-19 | End: 2024-02-19

## 2024-02-19 RX ADMIN — TRIAMCINOLONE ACETONIDE 80 MG: 40 INJECTION, SUSPENSION INTRA-ARTICULAR; INTRAMUSCULAR at 12:02

## 2024-02-19 NOTE — TELEPHONE ENCOUNTER
----- Message from Lesly Garcia sent at 2/19/2024  8:44 AM CST -----  Type:  Same Day Appointment Request    Caller is requesting a same day appointment.  Caller declined first available appointment listed below.    Name of Caller:Pt   When is the first available appointment? May 27  Symptoms:back pain   Best Call Back Number:[phone]  Additional Information: pt states he can go no more... been in pain all weekend

## 2024-02-25 NOTE — PROGRESS NOTES
" Patient ID: Teo Gloria is a 83 y.o. male.    Chief Complaint: Back Pain    HPI      Teo Gloria is a 83 y.o. male chronic intermittent lower back pain.  No loss of strength or sensation.  Pain with ambulation and trying to lift and move his wife.  Vitals:    02/19/24 1207   BP: 134/82   BP Location: Left arm   Patient Position: Sitting   Pulse: 72   Temp: 98.2 °F (36.8 °C)   TempSrc: Oral   SpO2: 95%   Weight: 90.5 kg (199 lb 8.3 oz)   Height: 5' 8" (1.727 m)            Review of Symptoms      Physical Exam    Constitutional:  Oriented to person, place, and time.appears well-developed and well-nourished.  No distress.      HENT  Head: Normocephalic and atraumatic  Right Ear: External ear normal.   Left Ear: External ear normal.   Nose: External nose normal.   Mouth:  Moist mucus membranes.    Eyes:  Conjunctivae are normal. Right eye exhibits no discharge.  Left eye exhibits no discharge. No scleral icterus.  No periorbital edema      Musculoskeletal:  No edema. No obvious deformity No wasting       Neurological:  Alert and oriented to person, place, and time.   Coordination normal.     Skin:   Skin is warm and dry.  No diaphoresis.   No rash noted.     Psychiatric: Normal mood and affect. Behavior is normal.  Judgment and thought content normal.     Complete Blood Count  Lab Results   Component Value Date    RBC 4.13 (L) 02/20/2023    HGB 12.3 (L) 02/20/2023    HCT 37.9 (L) 02/20/2023    MCV 92 02/20/2023    MCH 29.8 02/20/2023    MCHC 32.5 02/20/2023    RDW 13.0 02/20/2023     02/20/2023    MPV 10.5 02/20/2023    GRAN 6.1 02/20/2023    GRAN 67.9 02/20/2023    LYMPH 1.4 02/20/2023    LYMPH 15.0 (L) 02/20/2023    MONO 1.0 02/20/2023    MONO 10.7 02/20/2023    EOS 0.4 02/20/2023    BASO 0.07 02/20/2023    EOSINOPHIL 4.3 02/20/2023    BASOPHIL 0.8 02/20/2023    DIFFMETHOD Automated 02/20/2023       Comprehensive Metabolic Panel  No results found for: "GLU", "BUN", "CREATININE", "NA", "K", "CL", "PROT", " ""ALBUMIN", "BILITOT", "AST", "ALKPHOS", "CO2", "ALT", "ANIONGAP", "EGFRNONAA", "ESTGFRAFRICA"    TSH  No results found for: "TSH"    Assessment / Plan:      ICD-10-CM ICD-9-CM   1. Acute midline low back pain without sciatica  M54.50 724.2   2. Inflammation around joint  M77.9 726.90   3. Arthritis  M19.90 716.90     Acute midline low back pain without sciatica  -     triamcinolone acetonide injection 80 mg    Inflammation around joint  -     triamcinolone acetonide injection 80 mg    Arthritis  -     triamcinolone acetonide injection 80 mg    Other orders  -     predniSONE (DELTASONE) 20 MG tablet; One tablet daily by mouth for five days  Dispense: 5 tablet; Refill: 0  -     oxyCODONE (ROXICODONE) 5 MG immediate release tablet; Take 1 tablet (5 mg total) by mouth every 4 (four) hours as needed for Pain. Severe  Dispense: 15 tablet; Refill: 0    Heat ice over-the-counter medications prednisone and oxycodone  "

## 2024-02-26 ENCOUNTER — TELEPHONE (OUTPATIENT)
Dept: FAMILY MEDICINE | Facility: CLINIC | Age: 84
End: 2024-02-26

## 2024-02-26 ENCOUNTER — OFFICE VISIT (OUTPATIENT)
Dept: FAMILY MEDICINE | Facility: CLINIC | Age: 84
End: 2024-02-26
Payer: MEDICARE

## 2024-02-26 VITALS
OXYGEN SATURATION: 96 % | SYSTOLIC BLOOD PRESSURE: 132 MMHG | HEIGHT: 68 IN | DIASTOLIC BLOOD PRESSURE: 84 MMHG | HEART RATE: 70 BPM | BODY MASS INDEX: 29.92 KG/M2 | TEMPERATURE: 98 F | WEIGHT: 197.44 LBS

## 2024-02-26 DIAGNOSIS — M54.9 DORSALGIA, UNSPECIFIED: Primary | ICD-10-CM

## 2024-02-26 PROCEDURE — 99214 OFFICE O/P EST MOD 30 MIN: CPT | Mod: S$GLB,,, | Performed by: FAMILY MEDICINE

## 2024-02-26 RX ORDER — IBUPROFEN 800 MG/1
800 TABLET ORAL 3 TIMES DAILY
Qty: 15 TABLET | Refills: 1 | Status: SHIPPED | OUTPATIENT
Start: 2024-02-26

## 2024-02-26 RX ORDER — HYDROMORPHONE HYDROCHLORIDE 4 MG/1
TABLET ORAL
Qty: 25 TABLET | Refills: 0 | Status: SHIPPED | OUTPATIENT
Start: 2024-02-26 | End: 2024-03-17 | Stop reason: SDUPTHER

## 2024-02-26 NOTE — TELEPHONE ENCOUNTER
----- Message from Chaz Hall sent at 2/26/2024  8:29 AM CST -----  Type:  Sooner Apoointment Request    Caller is requesting a sooner appointment.  Caller declined first available appointment listed below.  Caller will not accept being placed on the waitlist and is requesting a message be sent to doctor.  Name of Caller:pt  When is the first available appointment?04/2024  Symptoms:lower back pain  Would the patient rather a call back or a response via [facility]? call  Best Call Back Number:[phone]  [phone]  Additional Information:

## 2024-02-29 ENCOUNTER — EXTERNAL CHRONIC CARE MANAGEMENT (OUTPATIENT)
Dept: PRIMARY CARE CLINIC | Facility: CLINIC | Age: 84
End: 2024-02-29
Payer: MEDICARE

## 2024-02-29 PROCEDURE — 99490 CHRNC CARE MGMT STAFF 1ST 20: CPT | Mod: S$GLB,,, | Performed by: FAMILY MEDICINE

## 2024-03-01 ENCOUNTER — HOSPITAL ENCOUNTER (OUTPATIENT)
Dept: RADIOLOGY | Facility: HOSPITAL | Age: 84
Discharge: HOME | End: 2024-03-01
Attending: FAMILY MEDICINE
Payer: MEDICARE

## 2024-03-01 DIAGNOSIS — M54.9 DORSALGIA, UNSPECIFIED: ICD-10-CM

## 2024-03-01 PROCEDURE — 72148 MRI LUMBAR SPINE W/O DYE: CPT | Mod: TC,PN

## 2024-03-01 PROCEDURE — 72148 MRI LUMBAR SPINE W/O DYE: CPT | Mod: 26,,, | Performed by: STUDENT IN AN ORGANIZED HEALTH CARE EDUCATION/TRAINING PROGRAM

## 2024-03-03 DIAGNOSIS — S32.030A COMPRESSION FRACTURE OF L3 VERTEBRA, INITIAL ENCOUNTER: Primary | ICD-10-CM

## 2024-03-03 NOTE — PROGRESS NOTES
" Patient ID: Teo Gloria is a 83 y.o. male.    Chief Complaint: Back Pain    HPI       Teo Gloria is a 83 y.o. male here with complains of back pain.  Patient was here recently for lower back pain midline.  No complains of pain radiating down to his legs.  A little bit different than the previous sciatic pain he has experienced in the past.  Previous medications have not dampened the pain.  Also taking over-the-counter medications including Tylenol and ibuprofen.  Difficult to take care of his wife who has been written because of this lower lumbar pain.      Review of Symptoms      /84 (BP Location: Left arm, Patient Position: Sitting)   Pulse 70   Temp 98.4 °F (36.9 °C) (Oral)   Ht 5' 8" (1.727 m)   Wt 89.5 kg (197 lb 6.8 oz)   SpO2 96%   BMI 30.02 kg/m²     Physical Exam    Vitals:    02/26/24 1557   BP: 132/84   BP Location: Left arm   Patient Position: Sitting   Pulse: 70   Temp: 98.4 °F (36.9 °C)   TempSrc: Oral   SpO2: 96%   Weight: 89.5 kg (197 lb 6.8 oz)   Height: 5' 8" (1.727 m)       Constitutional:   Oriented to person, place, and time.appears well-developed and well-nourished.   No distress.      HENT  Head: Normocephalic and atraumatic  Right Ear: External ear normal.   Left Ear: External ear normal.   Nose: External nose normal.   Mouth: Moist mucous membranes    Eyes:   Conjunctivae are normal. Right eye exhibits no discharge. Left eye exhibits no discharge. No scleral icterus. No periorbital edema    Musculoskeletal:  No edema. No obvious deformity No wasting     Neurological:  Alert and oriented to person, place, and time. Coordination normal.     Skin:   Skin is warm and dry.  No diaphoresis.   No rash noted.     Psychiatric: Normal mood and affect. Behavior is normal. Judgment and thought content normal.     Complete Blood Count  Lab Results   Component Value Date    RBC 4.13 (L) 02/20/2023    HGB 12.3 (L) 02/20/2023    HCT 37.9 (L) 02/20/2023    MCV 92 02/20/2023    MCH 29.8 " "02/20/2023    MCHC 32.5 02/20/2023    RDW 13.0 02/20/2023     02/20/2023    MPV 10.5 02/20/2023    GRAN 6.1 02/20/2023    GRAN 67.9 02/20/2023    LYMPH 1.4 02/20/2023    LYMPH 15.0 (L) 02/20/2023    MONO 1.0 02/20/2023    MONO 10.7 02/20/2023    EOS 0.4 02/20/2023    BASO 0.07 02/20/2023    EOSINOPHIL 4.3 02/20/2023    BASOPHIL 0.8 02/20/2023    DIFFMETHOD Automated 02/20/2023       Comprehensive Metabolic Panel  No results found for: "GLU", "BUN", "CREATININE", "NA", "K", "CL", "PROT", "ALBUMIN", "BILITOT", "AST", "ALKPHOS", "CO2", "ALT", "ANIONGAP", "EGFRNONAA", "ESTGFRAFRICA"    TSH  No results found for: "TSH"    Assessment / Plan:      ICD-10-CM ICD-9-CM   1. Dorsalgia, unspecified  M54.9 724.5     Dorsalgia, unspecified  -     HYDROmorphone (DILAUDID) 4 MG tablet; One or two tablets po q 6 hours as needed for pain  Dispense: 25 tablet; Refill: 0  -     MRI Lumbar Spine Without Contrast; Future; Expected date: 02/26/2024    Other orders  -     ibuprofen (ADVIL,MOTRIN) 800 MG tablet; Take 1 tablet (800 mg total) by mouth 3 (three) times daily.  Dispense: 15 tablet; Refill: 1    Advil t.i.d. MRI lumbar spine and Dilaudid      "

## 2024-03-05 ENCOUNTER — HOSPITAL ENCOUNTER (OUTPATIENT)
Dept: RADIOLOGY | Facility: HOSPITAL | Age: 84
Discharge: HOME | End: 2024-03-05
Attending: FAMILY MEDICINE
Payer: MEDICARE

## 2024-03-05 DIAGNOSIS — S32.030A COMPRESSION FRACTURE OF L3 VERTEBRA, INITIAL ENCOUNTER: ICD-10-CM

## 2024-03-05 PROCEDURE — 77080 DXA BONE DENSITY AXIAL: CPT | Mod: TC,PN

## 2024-03-05 PROCEDURE — 77080 DXA BONE DENSITY AXIAL: CPT | Mod: 26,,, | Performed by: STUDENT IN AN ORGANIZED HEALTH CARE EDUCATION/TRAINING PROGRAM

## 2024-03-08 ENCOUNTER — TELEPHONE (OUTPATIENT)
Dept: FAMILY MEDICINE | Facility: CLINIC | Age: 84
End: 2024-03-08
Payer: MEDICARE

## 2024-03-08 NOTE — TELEPHONE ENCOUNTER
----- Message from Chris Alvarez sent at 3/8/2024  8:21 AM CST -----  .Type:  Needs Medical Advice    Who Called: call back    Would the patient rather a call back or a response via [facility]? Call back  Best Call Back Number:[phone]  Additional Information:     Pt stated he would like a call back for the results from his bone density scan

## 2024-03-11 ENCOUNTER — TELEPHONE (OUTPATIENT)
Dept: FAMILY MEDICINE | Facility: CLINIC | Age: 84
End: 2024-03-11
Payer: MEDICARE

## 2024-03-11 NOTE — TELEPHONE ENCOUNTER
----- Message from Chris Alvarez sent at 3/11/2024  8:31 AM CDT -----  .Type:  Needs Medical Advice    Who Called: pt    Would the patient rather a call back or a response via [facility]? Call back  Best Call Back Number: [phone]  Additional Information:     Pt stated he would like a call back for the results from his bone density scan    .please review results and advise

## 2024-03-13 NOTE — TELEPHONE ENCOUNTER
Your bone density test show slight decrease in density but  NOT osteoporosis.  DR wilson will call you about treatment options

## 2024-03-17 DIAGNOSIS — M54.9 DORSALGIA, UNSPECIFIED: ICD-10-CM

## 2024-03-17 DIAGNOSIS — S32.030D COMPRESSION FRACTURE OF L3 VERTEBRA WITH ROUTINE HEALING, SUBSEQUENT ENCOUNTER: Primary | ICD-10-CM

## 2024-03-17 RX ORDER — HYDROMORPHONE HYDROCHLORIDE 4 MG/1
TABLET ORAL
Qty: 45 TABLET | Refills: 0 | Status: SHIPPED | OUTPATIENT
Start: 2024-03-17 | End: 2024-04-08

## 2024-03-17 RX ORDER — IBANDRONATE SODIUM 150 MG/1
150 TABLET, FILM COATED ORAL
Qty: 1 TABLET | Refills: 11 | Status: SHIPPED | OUTPATIENT
Start: 2024-03-17 | End: 2025-03-17

## 2024-03-27 ENCOUNTER — TELEPHONE (OUTPATIENT)
Dept: FAMILY MEDICINE | Facility: CLINIC | Age: 84
End: 2024-03-27
Payer: MEDICARE

## 2024-03-27 DIAGNOSIS — S32.030D COMPRESSION FRACTURE OF L3 VERTEBRA WITH ROUTINE HEALING, SUBSEQUENT ENCOUNTER: Primary | ICD-10-CM

## 2024-03-27 NOTE — TELEPHONE ENCOUNTER
----- Message from Lesly Garcia sent at 3/27/2024  4:46 PM CDT -----  Type:  Patient Returning Call    Who Called:Pt   Would the patient rather a call back or a response via [facility]? Call back   Best Call Back Number: [phone]  Additional Information: requesting stronger pain medication for back pain

## 2024-03-28 NOTE — TELEPHONE ENCOUNTER
There really isn't any stronger pain medicine-since you are still in pain can I send you to the neurosurgeons for possible procedure to help sure up your compression fracture?

## 2024-03-28 NOTE — TELEPHONE ENCOUNTER
Pt has been notified and verbalized understanding that referral for neurosurgery has been placed. Referral coordinator will call pt to schedule.

## 2024-03-31 ENCOUNTER — EXTERNAL CHRONIC CARE MANAGEMENT (OUTPATIENT)
Dept: PRIMARY CARE CLINIC | Facility: CLINIC | Age: 84
End: 2024-03-31
Payer: MEDICARE

## 2024-03-31 PROCEDURE — 99490 CHRNC CARE MGMT STAFF 1ST 20: CPT | Mod: S$GLB,,, | Performed by: FAMILY MEDICINE

## 2024-04-02 ENCOUNTER — TELEPHONE (OUTPATIENT)
Dept: NEUROSURGERY | Facility: CLINIC | Age: 84
End: 2024-04-02

## 2024-04-02 ENCOUNTER — HOSPITAL ENCOUNTER (OUTPATIENT)
Dept: RADIOLOGY | Facility: HOSPITAL | Age: 84
Discharge: HOME | End: 2024-04-02
Attending: PHYSICIAN ASSISTANT
Payer: MEDICARE

## 2024-04-02 ENCOUNTER — OFFICE VISIT (OUTPATIENT)
Dept: NEUROSURGERY | Facility: CLINIC | Age: 84
End: 2024-04-02
Payer: MEDICARE

## 2024-04-02 VITALS
HEART RATE: 61 BPM | HEIGHT: 68 IN | DIASTOLIC BLOOD PRESSURE: 86 MMHG | BODY MASS INDEX: 29.9 KG/M2 | WEIGHT: 197.31 LBS | SYSTOLIC BLOOD PRESSURE: 183 MMHG

## 2024-04-02 DIAGNOSIS — S32.030D COMPRESSION FRACTURE OF L3 VERTEBRA WITH ROUTINE HEALING, SUBSEQUENT ENCOUNTER: ICD-10-CM

## 2024-04-02 DIAGNOSIS — M47.816 SPONDYLOSIS OF LUMBAR REGION WITHOUT MYELOPATHY OR RADICULOPATHY: Primary | ICD-10-CM

## 2024-04-02 DIAGNOSIS — M48.061 SPINAL STENOSIS, LUMBAR REGION WITHOUT NEUROGENIC CLAUDICATION: ICD-10-CM

## 2024-04-02 DIAGNOSIS — M51.36 DDD (DEGENERATIVE DISC DISEASE), LUMBAR: ICD-10-CM

## 2024-04-02 DIAGNOSIS — M51.26 HNP (HERNIATED NUCLEUS PULPOSUS), LUMBAR: ICD-10-CM

## 2024-04-02 PROCEDURE — 72100 X-RAY EXAM L-S SPINE 2/3 VWS: CPT | Mod: 26,,, | Performed by: RADIOLOGY

## 2024-04-02 PROCEDURE — 72100 X-RAY EXAM L-S SPINE 2/3 VWS: CPT | Mod: TC,FY

## 2024-04-02 PROCEDURE — 99999 PR PBB SHADOW E&M-EST. PATIENT-LVL V: CPT | Mod: PBBFAC,,, | Performed by: PHYSICIAN ASSISTANT

## 2024-04-02 PROCEDURE — 99215 OFFICE O/P EST HI 40 MIN: CPT | Mod: PBBFAC,25,PN | Performed by: PHYSICIAN ASSISTANT

## 2024-04-02 PROCEDURE — 99204 OFFICE O/P NEW MOD 45 MIN: CPT | Mod: S$PBB,,, | Performed by: PHYSICIAN ASSISTANT

## 2024-04-02 NOTE — TELEPHONE ENCOUNTER
Please let patient know lumbar xrays looks stable.    Lesly Qiu [facility], PA-C  Neurosurgery  [facility]  04/02/2024

## 2024-04-02 NOTE — PROGRESS NOTES
Subjective:     Patient ID:  Teo Gloria is a 84 y.o. male.    Lusi    Chief Complaint:  Low back pain    HPI   04/02/2024    Teo Gloria is a 84 y.o. male who presents with the above CC.  Patient states about a month ago he turned and started to feel a pulling sensation in his left low back and has had left low back pain.  The pain is now more on the right low back and right buttock but has worsened over time.  The pain is constant and worse when walking standing and slightly better with sitting.  He denies any leg pain or paresthesias.  He states he had a fall in his yd about 3 months ago but no other falls or trauma in the past.    Physical therapy for the low back a couple years ago.  Spine surgery 20 years ago with Dr. Heller that was very successful.  No recent conservative treatment for his low back or neck.     He recently had a DEXA scan which showed osteopenia and is now being treated for that with Boniva.    Patient denies any recent accidents or trauma, no saddle anesthesias, and no bowel or bladder incontinence.      Review of Systems:    Review of Systems   Constitutional:  Negative for chills, diaphoresis, fever, malaise/fatigue and weight loss.   HENT:  Negative for congestion, ear discharge, ear pain, hearing loss, nosebleeds, sinus pain, sore throat and tinnitus.    Eyes:  Negative for blurred vision, double vision, photophobia, pain, discharge and redness.   Respiratory:  Negative for cough, hemoptysis, sputum production, shortness of breath, wheezing and stridor.    Cardiovascular:  Positive for leg swelling. Negative for chest pain, palpitations, orthopnea and PND.   Gastrointestinal:  Negative for abdominal pain, blood in stool, constipation, diarrhea, heartburn, melena, nausea and vomiting.   Genitourinary:  Negative for dysuria, flank pain, frequency, hematuria and urgency.   Musculoskeletal:  Positive for back pain and falls. Negative for joint pain, myalgias and neck pain.   Skin:   Negative for itching and rash.   Neurological:  Negative for dizziness, tingling, tremors, sensory change, speech change, seizures, loss of consciousness, weakness and headaches.   Endo/Heme/Allergies:  Negative for environmental allergies and polydipsia. Bruises/bleeds easily.   Psychiatric/Behavioral:  Negative for depression, hallucinations, memory loss and substance abuse. The patient is not nervous/anxious and does not have insomnia.          Past Medical History:   Diagnosis Date    Hypertension      Past Surgical History:   Procedure Laterality Date    APPENDECTOMY      BACK SURGERY      CHOLECYSTECTOMY      COLONOSCOPY  01/01/2017    repeat Q 5 years    EYE SURGERY Bilateral     cataract    HERNIA REPAIR      SHOULDER ARTHROSCOPY Bilateral     TOTAL KNEE ARTHROPLASTY Right 1960's     Current Outpatient Medications on File Prior to Visit   Medication Sig Dispense Refill    abiraterone (ZYTIGA) 250 mg Tab 250 mg 4 tablets in the morning.      amitriptyline (ELAVIL) 10 MG tablet TAKE 1-3 TABLETS BY MOUTH AT BEDTIME FOR SLEEP 270 tablet 1    aspirin (ECOTRIN) 81 MG EC tablet Take 81 mg by mouth once daily.      cephALEXin (KEFLEX) 500 MG capsule Take 500 mg by mouth 4 (four) times daily.      cyclobenzaprine (FLEXERIL) 10 MG tablet Take 10 mg by mouth 3 (three) times daily as needed for Muscle spasms.      furosemide (LASIX) 40 MG tablet Take 1 tablet (40 mg total) by mouth once daily. For  2 weeks and may repeat 30 tablet 2    gabapentin (NEURONTIN) 100 MG capsule One to three tablets each night. 270 capsule 3    HYDROmorphone (DILAUDID) 4 MG tablet One or two tablets po q 6 hours as needed for pain 45 tablet 0    ibandronate (BONIVA) 150 mg tablet Take 1 tablet (150 mg total) by mouth every 30 days. Help maintain bone density 1 tablet 11    ibuprofen (ADVIL,MOTRIN) 800 MG tablet Take 1 tablet (800 mg total) by mouth 3 (three) times daily. 15 tablet 1    lisinopriL 10 MG tablet Take 1 tablet (10 mg total) by  "mouth once daily. 90 tablet 3    methocarbamoL (ROBAXIN) 500 MG Tab 1 tablet p.o. q.6 hours as needed for muscle spasm. 45 tablet 1    potassium chloride SA (K-DUR,KLOR-CON) 20 MEQ tablet Take 1 tablet (20 mEq total) by mouth once daily. When taking lasix 30 tablet 1    predniSONE (DELTASONE) 20 MG tablet One tablet daily by mouth for five days 5 tablet 0     No current facility-administered medications on file prior to visit.     Review of patient's allergies indicates:   Allergen Reactions    Benadryl [diphenhydramine hcl] Hives    Clindamycin Hives    Trazodone      Diarrhea      Xarelto [rivaroxaban] Swelling     Knee swelling    Hydrocodone-acetaminophen Rash     Social History     Socioeconomic History    Marital status:    Tobacco Use    Smoking status: Never     Passive exposure: Never    Smokeless tobacco: Never   Substance and Sexual Activity    Alcohol use: No    Drug use: No     Family History   Problem Relation Age of Onset    Cancer Mother     Parkinsonism Father        Objective:      Vitals:    04/02/24 0914   BP: (!) 183/86   Pulse: 61   Weight: 89.5 kg (197 lb 5 oz)   Height: 5' 7.99" (1.727 m)   PainSc: 10-Worst pain ever   PainLoc: Back         Physical Exam:      General:  Teo Gloria is well-developed, well-nourished, appears stated age, in no acute distress, alert and oriented to person, place, and time.    Pulmonary/Chest:  Respiratory effort normal  Abdominal: Exhibits no distension  Psychiatric:  Normal mood and affect.  Behavior is normal.  Judgement and thought content normal      Musculoskeletal:        Lumbar ROM:   No pain in lumbar flexion.  Pain in extension, left lateral bending, and right lateral bending.    Lumbar Spine Inspection:  Normal with no surgical scars and no visible rashes.    Lumbar Spine Palpation:  Point tenderness to lower back palpation.    SI Joint Palpation:  No tenderness to SI Joint palpation.        Neurological:     Muscle strength against " resistance:     Right Left   Hip flexion  5 / 5 5 / 5   Hip extension 5 / 5 5 / 5   Hip abduction 5 / 5 5 / 5   Hip adduction  5 / 5 5 / 5   Knee extension  5 / 5 5 / 5   Knee flexion 5 / 5 5 / 5   Dorsiflexion  5 / 5 5 / 5   EHL  5 / 5 5 / 5   Plantar flexion  5 / 5 5 / 5   Inversion of the feet 5 / 5 5 / 5   Eversion of the feet  5 / 5 5 / 5       Reflexes:     Right Left   Patellar 2+ 2+   Achilles 2+ 2+     Clonus:  Negative bilaterally    On gross examination of the bilateral upper extremities, patient has full painfree ROM with no signs of clubbing, cyanosis, edema, or weakness.       MRI Interpretation:     Lumbar spine MRI personally reviewed today from 2024.  Chronic L4 compression fracture.  Acute to subacute compression fracture L3.  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 Right L5-S1 laminectomy defect.      Assessment:          1. Spondylosis of lumbar region without myelopathy or radiculopathy    2. Compression fracture of L3 vertebra with routine healing, subsequent encounter    3. DDD (degenerative disc disease), lumbar    4. Spinal stenosis, lumbar region without neurogenic claudication    5. HNP (herniated nucleus pulposus), lumbar            Plan:          Orders Placed This Encounter    X-Ray Lumbar Spine Ap And Lateral    X-Ray Lumbar Spine Ap And Lateral       Will get x-rays today    Discussed options including conservative treatment versus kyphoplasty and he would like to pursue conservative treatment at this time.      Would recommend wearing LSO brace during the day whenever out of bed.  We will order that from [facility]     Follow-up in 6 weeks with repeat lumbar x-rays    Follow-Up:  Follow up in about 6 weeks (around 5/14/2024). If there are any questions prior to this, the patient was instructed to contact the office.       Lesly Qiu, [facility], PANehemiasC  Neurosurgery  [facility]  04/02/2024

## 2024-04-03 ENCOUNTER — TELEPHONE (OUTPATIENT)
Dept: NEUROSURGERY | Facility: CLINIC | Age: 84
End: 2024-04-03
Payer: MEDICARE

## 2024-04-08 DIAGNOSIS — M54.9 DORSALGIA, UNSPECIFIED: ICD-10-CM

## 2024-04-08 RX ORDER — LISINOPRIL 10 MG/1
10 TABLET ORAL DAILY
Qty: 90 TABLET | Refills: 3 | Status: SHIPPED | OUTPATIENT
Start: 2024-04-08

## 2024-04-08 RX ORDER — HYDROMORPHONE HYDROCHLORIDE 4 MG/1
TABLET ORAL
Qty: 45 TABLET | Refills: 0 | Status: SHIPPED | OUTPATIENT
Start: 2024-04-08 | End: 2024-04-11 | Stop reason: SDUPTHER

## 2024-04-08 NOTE — TELEPHONE ENCOUNTER
Care Due:                  Date            Visit Type   Department     Provider  --------------------------------------------------------------------------------                                SAME DAY -                              ESTABLISHED   [facility]  Last Visit: 02-      PATIENT      MEDICINE       Fredis Hector  Next Visit: None Scheduled  None         None Found                                                            Last  Test          Frequency    Reason                     Performed    Due Date  --------------------------------------------------------------------------------    CBC.........  12 months..  ibuprofen................  02-   02-    CMP.........  12 months..  furosemide, ibandronate,   02-   02-                             ibuprofen, lisinopriL,                             potassium................    Mg Level....  12 months..  ibandronate..............  Not Found    Overdue    Phosphate...  12 months..  ibandronate..............  Not Found    Overdue    Vitamin D...  12 months..  ibandronate..............  Not Found    Overdue    [employer] Embedded Care Due Messages. Reference number: [id number].   4/08/2024 9:55:45 AM CDT

## 2024-04-08 NOTE — TELEPHONE ENCOUNTER
No care due was identified.  [facility] Embedded Care Due Messages. Reference number: [id number].   4/08/2024 10:09:21 AM CDT

## 2024-04-08 NOTE — TELEPHONE ENCOUNTER
----- Message from Lesly Garcia sent at 4/8/2024 10:00 AM CDT -----  Type:  RX Refill Request    Who Called: Pt   Refill or New Rx: refill   RX Name and Strength:     (lisinopriL 10 MG tablet,   HYDROmorphone (DILAUDID) 4 MG tablet    How is the patient currently taking it? (ex. 1XDay):1x   Is this a 30 day or 90 day RX:90  Preferred Pharmacy with phone number:[id number] - [address]   Phone: [phone]  Fax: [phone]  Would the patient rather a call back or a response via [facility]? Call back   Best Call Back Number:[phone]  Additional Information:

## 2024-04-10 ENCOUNTER — TELEPHONE (OUTPATIENT)
Dept: FAMILY MEDICINE | Facility: CLINIC | Age: 84
End: 2024-04-10
Payer: MEDICARE

## 2024-04-10 NOTE — TELEPHONE ENCOUNTER
----- Message from Jena Boudreaux sent at 4/10/2024  9:54 AM CDT -----  Type:  Needs Medical Advice    Who Called: pt  Symptoms (please be specific): pain R leg   How long has patient had these symptoms:  a few days  Pharmacy name and phone #:  [id number] - [place] - [address] [address]  Phone: [phone] Fax: [phone]   Would the patient rather a call back or a response via [facility]? call  Best Call Back Number: [phone]   Additional Information:   Pt requesting a call regarding pain in his R leg and wanted something called in to the pharmacy

## 2024-04-11 ENCOUNTER — TELEPHONE (OUTPATIENT)
Dept: FAMILY MEDICINE | Facility: CLINIC | Age: 84
End: 2024-04-11
Payer: MEDICARE

## 2024-04-11 DIAGNOSIS — M54.9 DORSALGIA, UNSPECIFIED: ICD-10-CM

## 2024-04-11 RX ORDER — HYDROMORPHONE HYDROCHLORIDE 2 MG/1
TABLET ORAL
Qty: 60 TABLET | Refills: 0 | Status: SHIPPED | OUTPATIENT
Start: 2024-04-11 | End: 2024-04-26 | Stop reason: SDUPTHER

## 2024-04-11 NOTE — TELEPHONE ENCOUNTER
----- Message from Chris Alvarez sent at 4/11/2024 10:46 AM CDT -----  Type:  Needs Medical Advice    Who Called: pt    Would the patient rather a call back or a response via [facility]? Call back  Best Call Back Number:   Additional Information:     Pt stated he would like a call back as soon as possible because the pharmacy doesn't have his medication for HYDROmorphone (DILAUDID) 4 MG tablet in the 4 mg tablets but they have the 2 mg tablets and pt wants to know if he can get the 2 mg tablets and double up on it

## 2024-04-26 DIAGNOSIS — M54.9 DORSALGIA, UNSPECIFIED: ICD-10-CM

## 2024-04-26 RX ORDER — HYDROMORPHONE HYDROCHLORIDE 2 MG/1
TABLET ORAL
Qty: 60 TABLET | Refills: 0 | Status: SHIPPED | OUTPATIENT
Start: 2024-04-26

## 2024-04-26 NOTE — TELEPHONE ENCOUNTER
----- Message from Lesly Garcia sent at 4/26/2024  9:01 AM CDT -----  Type:  RX Refill Request    Who Called: Pt   Refill or New Rx:refill  RX Name and Strength:HYDROmorphone (DILAUDID) 2 MG tablet  How is the patient currently taking it? (ex. 1XDay): 2x  Is this a 30 day or 90 day RX:30  Preferred Pharmacy with phone number:[id number] [address]   Phone: [phone]  Fax: [phone]  Would the patient rather a call back or a response via [facility]? Call back  Best Call Back Number:[phone]  Additional Information: only have medication for today.. pt states he needs refill urgent

## 2024-04-26 NOTE — TELEPHONE ENCOUNTER
No care due was identified.  [facility] Embedded Care Due Messages. Reference number: [id number].   4/26/2024 9:06:47 AM CDT

## 2024-04-30 ENCOUNTER — EXTERNAL CHRONIC CARE MANAGEMENT (OUTPATIENT)
Dept: PRIMARY CARE CLINIC | Facility: CLINIC | Age: 84
End: 2024-04-30
Payer: MEDICARE

## 2024-04-30 PROCEDURE — 99490 CHRNC CARE MGMT STAFF 1ST 20: CPT | Mod: S$GLB,,, | Performed by: FAMILY MEDICINE

## 2024-05-14 ENCOUNTER — TELEPHONE (OUTPATIENT)
Dept: NEUROSURGERY | Facility: CLINIC | Age: 84
End: 2024-05-14

## 2024-05-14 ENCOUNTER — HOSPITAL ENCOUNTER (OUTPATIENT)
Dept: RADIOLOGY | Facility: HOSPITAL | Age: 84
Discharge: HOME | End: 2024-05-14
Attending: PHYSICIAN ASSISTANT
Payer: MEDICARE

## 2024-05-14 ENCOUNTER — OFFICE VISIT (OUTPATIENT)
Dept: NEUROSURGERY | Facility: CLINIC | Age: 84
End: 2024-05-14
Payer: MEDICARE

## 2024-05-14 VITALS
BODY MASS INDEX: 29.9 KG/M2 | HEIGHT: 68 IN | HEART RATE: 59 BPM | WEIGHT: 197.31 LBS | SYSTOLIC BLOOD PRESSURE: 182 MMHG | DIASTOLIC BLOOD PRESSURE: 79 MMHG

## 2024-05-14 DIAGNOSIS — S32.030D COMPRESSION FRACTURE OF L3 VERTEBRA WITH ROUTINE HEALING, SUBSEQUENT ENCOUNTER: ICD-10-CM

## 2024-05-14 DIAGNOSIS — S32.030D COMPRESSION FRACTURE OF L3 VERTEBRA WITH ROUTINE HEALING, SUBSEQUENT ENCOUNTER: Primary | ICD-10-CM

## 2024-05-14 PROCEDURE — 72100 X-RAY EXAM L-S SPINE 2/3 VWS: CPT | Mod: TC,FY

## 2024-05-14 PROCEDURE — 99999 PR PBB SHADOW E&M-EST. PATIENT-LVL III: CPT | Mod: PBBFAC,,, | Performed by: PHYSICIAN ASSISTANT

## 2024-05-14 PROCEDURE — 99213 OFFICE O/P EST LOW 20 MIN: CPT | Mod: PBBFAC,25,PN | Performed by: PHYSICIAN ASSISTANT

## 2024-05-14 PROCEDURE — 99214 OFFICE O/P EST MOD 30 MIN: CPT | Mod: S$PBB,,, | Performed by: PHYSICIAN ASSISTANT

## 2024-05-14 PROCEDURE — 72100 X-RAY EXAM L-S SPINE 2/3 VWS: CPT | Mod: 26,,, | Performed by: RADIOLOGY

## 2024-05-14 NOTE — TELEPHONE ENCOUNTER
Dr. Smith,    Patient has acute to subacute compression fracture at L3 on MRI from March 1, 2024.  I saw him 6 weeks ago and he wanted to try conservative treatment with back bracing at that time.  The pain has not improved much and he would like to be considered for kyphoplasty.  Is this something that you agree with and if so would you schedule it or do you need to see him in clinic first?    Thanks,  Lesly Qiu, [facility], PA-C  Neurosurgery  [facility]  05/14/2024

## 2024-05-14 NOTE — PROGRESS NOTES
Subjective:     Patient ID:  Teo Gloria is a 84 y.o. male.    Luis    Chief Complaint:  Low back pain    Interval History:   05/14/2024    He continues to have severe pain in the low back.  It is only slightly improved compared to his visit here 6 weeks ago.  It is constant in the middle low back region.  No leg pain or paresthesias.  He has been wearing the back brace whenever out of bed.    HPI       eTo Gloria is a 84 y.o. male who presents with the above CC.  Patient states about a month ago he turned and started to feel a pulling sensation in his left low back and has had left low back pain.  The pain is now more on the right low back and right buttock but has worsened over time.  The pain is constant and worse when walking standing and slightly better with sitting.  He denies any leg pain or paresthesias.  He states he had a fall in his yd about 3 months ago but no other falls or trauma in the past.    Physical therapy for the low back a couple years ago.  Spine surgery 20 years ago with Dr. Heller that was very successful.  No recent conservative treatment for his low back or neck.     He recently had a DEXA scan which showed osteopenia and is now being treated for that with Boniva.    Patient denies any recent accidents or trauma, no saddle anesthesias, and no bowel or bladder incontinence.      Review of Systems:    Review of Systems   Constitutional:  Negative for chills, diaphoresis, fever, malaise/fatigue and weight loss.   HENT:  Negative for congestion, ear discharge, ear pain, hearing loss, nosebleeds, sinus pain, sore throat and tinnitus.    Eyes:  Negative for blurred vision, double vision, photophobia, pain, discharge and redness.   Respiratory:  Negative for cough, hemoptysis, sputum production, shortness of breath, wheezing and stridor.    Cardiovascular:  Positive for leg swelling. Negative for chest pain, palpitations, orthopnea and PND.   Gastrointestinal:  Negative for abdominal  pain, blood in stool, constipation, diarrhea, heartburn, melena, nausea and vomiting.   Genitourinary:  Negative for dysuria, flank pain, frequency, hematuria and urgency.   Musculoskeletal:  Positive for back pain and falls. Negative for joint pain, myalgias and neck pain.   Skin:  Negative for itching and rash.   Neurological:  Negative for dizziness, tingling, tremors, sensory change, speech change, seizures, loss of consciousness, weakness and headaches.   Endo/Heme/Allergies:  Negative for environmental allergies and polydipsia. Bruises/bleeds easily.   Psychiatric/Behavioral:  Negative for depression, hallucinations, memory loss and substance abuse. The patient is not nervous/anxious and does not have insomnia.          Past Medical History:   Diagnosis Date    Hypertension      Past Surgical History:   Procedure Laterality Date    APPENDECTOMY      BACK SURGERY      CHOLECYSTECTOMY      COLONOSCOPY  01/01/2017    repeat Q 5 years    EYE SURGERY Bilateral     cataract    HERNIA REPAIR      SHOULDER ARTHROSCOPY Bilateral     TOTAL KNEE ARTHROPLASTY Right 1960's     Current Outpatient Medications on File Prior to Visit   Medication Sig Dispense Refill    abiraterone (ZYTIGA) 250 mg Tab 250 mg 4 tablets in the morning.      amitriptyline (ELAVIL) 10 MG tablet TAKE 1-3 TABLETS BY MOUTH AT BEDTIME FOR SLEEP 270 tablet 1    aspirin (ECOTRIN) 81 MG EC tablet Take 81 mg by mouth once daily.      cephALEXin (KEFLEX) 500 MG capsule Take 500 mg by mouth 4 (four) times daily.      cyclobenzaprine (FLEXERIL) 10 MG tablet Take 10 mg by mouth 3 (three) times daily as needed for Muscle spasms.      furosemide (LASIX) 40 MG tablet Take 1 tablet (40 mg total) by mouth once daily. For  2 weeks and may repeat 30 tablet 2    gabapentin (NEURONTIN) 100 MG capsule One to three tablets each night. 270 capsule 3    HYDROmorphone (DILAUDID) 2 MG tablet TAKE 1-2 TABLETS BY MOUTH EVERY 6 HOURS AS NEEDED FOR PAIN 60 tablet 0     "ibandronate (BONIVA) 150 mg tablet Take 1 tablet (150 mg total) by mouth every 30 days. Help maintain bone density 1 tablet 11    ibuprofen (ADVIL,MOTRIN) 800 MG tablet Take 1 tablet (800 mg total) by mouth 3 (three) times daily. 15 tablet 1    lisinopriL 10 MG tablet Take 1 tablet (10 mg total) by mouth once daily. 90 tablet 3    methocarbamoL (ROBAXIN) 500 MG Tab 1 tablet p.o. q.6 hours as needed for muscle spasm. 45 tablet 1    potassium chloride SA (K-DUR,KLOR-CON) 20 MEQ tablet Take 1 tablet (20 mEq total) by mouth once daily. When taking lasix 30 tablet 1    predniSONE (DELTASONE) 20 MG tablet One tablet daily by mouth for five days 5 tablet 0     No current facility-administered medications on file prior to visit.     Review of patient's allergies indicates:   Allergen Reactions    Benadryl [diphenhydramine hcl] Hives    Clindamycin Hives    Trazodone      Diarrhea      Xarelto [rivaroxaban] Swelling     Knee swelling    Hydrocodone-acetaminophen Rash     Social History     Socioeconomic History    Marital status:    Tobacco Use    Smoking status: Never     Passive exposure: Never    Smokeless tobacco: Never   Substance and Sexual Activity    Alcohol use: No    Drug use: No     Family History   Problem Relation Name Age of Onset    Cancer Mother      Parkinsonism Father         Objective:      Vitals:    05/14/24 0839   BP: (!) 182/79   Pulse: (!) 59   Weight: 89.5 kg (197 lb 5 oz)   Height: 5' 7.99" (1.727 m)   PainSc:   7   PainLoc: Back         Physical Exam:      General:  Teo Gloria is well-developed, well-nourished, appears stated age, in no acute distress, alert and oriented to person, place, and time.    Pulmonary/Chest:  Respiratory effort normal  Abdominal: Exhibits no distension  Psychiatric:  Normal mood and affect.  Behavior is normal.  Judgement and thought content normal      Musculoskeletal:        Lumbar Spine Inspection:  Normal with no surgical scars and no visible " rashes.    Lumbar Spine Palpation:  Point tenderness to lower back palpation.    SI Joint Palpation:  No tenderness to SI Joint palpation.        Neurological:     Muscle strength against resistance:     Right Left   Hip flexion  5 / 5 5 / 5   Hip extension 5 / 5 5 / 5   Hip abduction 5 / 5 5 / 5   Hip adduction  5 / 5 5 / 5   Knee extension  5 / 5 5 / 5   Knee flexion 5 / 5 5 / 5   Dorsiflexion  5 / 5 5 / 5   EHL  5 / 5 5 / 5   Plantar flexion  5 / 5 5 / 5   Inversion of the feet 5 / 5 5 / 5   Eversion of the feet  5 / 5 5 / 5       Reflexes:     Right Left   Patellar 2+ 2+   Achilles 2+ 2+     Clonus:  Negative bilaterally    On gross examination of the bilateral upper extremities, patient has full painfree ROM with no signs of clubbing, cyanosis, edema, or weakness.       MRI Interpretation:     Lumbar spine MRI personally reviewed today from 2024.  Chronic L4 compression fracture.  Acute to subacute compression fracture L3.  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 Right L5-S1 laminectomy defect.      Assessment:          1. Compression fracture of L3 vertebra with routine healing, subsequent encounter            Plan:                 Xrays today show stable L3 compression fracture    Patient interested in kyphoplasty    Continue wearing back brace    Will discuss with Dr. Smith    Follow-Up:  Follow up if symptoms worsen or fail to improve. If there are any questions prior to this, the patient was instructed to contact the office.       Lesly Qiu [facility], PA-C  Neurosurgery  [facility]  05/14/2024

## 2024-05-30 ENCOUNTER — TELEPHONE (OUTPATIENT)
Dept: NEUROSURGERY | Facility: CLINIC | Age: 84
End: 2024-05-30
Payer: MEDICARE

## 2024-05-30 NOTE — TELEPHONE ENCOUNTER
Dr. Smith and Anatoliy,    The last message was on 05/14 that states you will fill out kyphoplasty consent and call the patient to discuss.    Patient is calling saying he has not heard anything.    Anatoliy can you reach out to the patient with an update on the above?    Thanks,  Lesly Qiu, [facility], PA-C  Neurosurgery  [facility]  05/30/2024

## 2024-05-30 NOTE — TELEPHONE ENCOUNTER
----- Message from Padmaja Golden MA sent at 5/30/2024  8:58 AM CDT -----  I don't think Dr. Smith every called patient.  ----- Message -----  From: Lucy Esquivel  Sent: 5/30/2024   8:29 AM CDT  To: Lazarus VALDOVINOS Staff    Type:  Call Office     Who Called:pt    Does the patient know what this is regarding?:  Would the patient rather a call back or a response via [facility]? Call   Best Call Back Number: [phone]  Additional Information:      Pt stated he has been waiting on a call from the office regarding his last visit and no one has reached out

## 2024-05-31 ENCOUNTER — EXTERNAL CHRONIC CARE MANAGEMENT (OUTPATIENT)
Dept: PRIMARY CARE CLINIC | Facility: CLINIC | Age: 84
End: 2024-05-31
Payer: MEDICARE

## 2024-05-31 PROCEDURE — 99490 CHRNC CARE MGMT STAFF 1ST 20: CPT | Mod: S$GLB,,, | Performed by: FAMILY MEDICINE

## 2024-06-04 ENCOUNTER — TELEMEDICINE (OUTPATIENT)
Dept: NEUROSURGERY | Facility: CLINIC | Age: 84
End: 2024-06-04
Payer: MEDICARE

## 2024-06-04 NOTE — PROGRESS NOTES
The patient consented to the consult held via telephone.  Name of consultant: Donis Smith MD  Chief complaint:  Low back pain  History:  He has been having severe low back pain since March 20, 2024.  He was found to have a new compression fracture at L3 with less than 50% vertebral body height loss.  A repeat x-ray in May 20, 2024 shows L3 compression fracture, stable in size compared to April 20, 2024.    The patient is complaining of 7/10 of low back pain at rest.  Pain gets worse with activity.  Pain is improved by lying down.  He has been treated conservatively with a back brace.  He is taking Tylenol for pain control.  He also received Advil 800 mg 3 times daily as needed.  He has stopped taking the Dilaudid due to side effects.    The pain does not radiate down his lower extremities.  The pain is midline.  No new onset of motor weakness or numbness.    Assessment:  L3 compression fracture with refractory low back pain despite conservative management.  Plan:I explained the natural history of the disease and all treatment options. I recommended a L3 kyphoplasty.     We have discussed the risks of surgery including death, coma, bleeding, infection, failure of surgery, CSF leak, nerve root injury, weakness, paralysis, peripheral neuropathy, migration of PMMA, pulmonary emboli, need for reoperation. Patient understands the risks and would like to proceed with surgery.    Total time spent: 15 min    Donis Smith MD  [phone]

## 2024-06-05 ENCOUNTER — TELEPHONE (OUTPATIENT)
Dept: NEUROSURGERY | Facility: CLINIC | Age: 84
End: 2024-06-05
Payer: MEDICARE

## 2024-06-05 DIAGNOSIS — I83.93 VARICOSE VEINS OF BOTH LOWER EXTREMITIES, UNSPECIFIED WHETHER COMPLICATED: ICD-10-CM

## 2024-06-05 DIAGNOSIS — Z01.818 PREOPERATIVE EXAMINATION: Primary | ICD-10-CM

## 2024-06-05 DIAGNOSIS — I10 ESSENTIAL HYPERTENSION: ICD-10-CM

## 2024-06-05 DIAGNOSIS — I70.0 AORTIC ATHEROSCLEROSIS: ICD-10-CM

## 2024-06-05 DIAGNOSIS — I48.0 PAROXYSMAL ATRIAL FIBRILLATION: ICD-10-CM

## 2024-06-05 DIAGNOSIS — S32.030D COMPRESSION FRACTURE OF L3 VERTEBRA WITH ROUTINE HEALING, SUBSEQUENT ENCOUNTER: ICD-10-CM

## 2024-06-05 NOTE — TELEPHONE ENCOUNTER
Patient is scheduled to have surgery with  on June 21, he will need PCP clearance     CBC  CMP  PT/PTT  Urinlaysis    EKG  Chest xray

## 2024-06-05 NOTE — TELEPHONE ENCOUNTER
Returned pt's call, I scheduled pt for surgery. Pt denied being diabetic and takes a low does aspirin everyday. I also stated to pt that I will send a message to his PCP for his clearance. Pt voiced understanding

## 2024-06-06 ENCOUNTER — TELEPHONE (OUTPATIENT)
Dept: FAMILY MEDICINE | Facility: CLINIC | Age: 84
End: 2024-06-06
Payer: MEDICARE

## 2024-06-06 ENCOUNTER — TELEPHONE (OUTPATIENT)
Dept: NEUROSURGERY | Facility: CLINIC | Age: 84
End: 2024-06-06
Payer: MEDICARE

## 2024-06-06 DIAGNOSIS — S32.030D COMPRESSION FRACTURE OF L3 VERTEBRA WITH ROUTINE HEALING, SUBSEQUENT ENCOUNTER: Primary | ICD-10-CM

## 2024-06-06 DIAGNOSIS — M48.061 SPINAL STENOSIS, LUMBAR REGION WITHOUT NEUROGENIC CLAUDICATION: ICD-10-CM

## 2024-06-06 DIAGNOSIS — Z98.890 S/P KYPHOPLASTY: ICD-10-CM

## 2024-06-06 RX ORDER — KETOCONAZOLE 20 MG/G
CREAM TOPICAL 2 TIMES DAILY
Qty: 60 G | Refills: 1 | Status: SHIPPED | OUTPATIENT
Start: 2024-06-06

## 2024-06-06 NOTE — TELEPHONE ENCOUNTER
----- Message from Lesly Garcia sent at 6/6/2024 10:50 AM CDT -----  Type:  Needs Medical Advice    Who Called: Pt   Symptoms (please be specific): rash between legs    How long has patient had these symptoms:  a few weeks  Pharmacy name and phone #:  [id number] - [place] [address]   Phone: [phone]  Fax: [phone]  Would the patient rather a call back or a response via [facility]? Call back   Best Call Back Number:  [phone]  Additional Information: was using over the counter cream and it is not working ... Would like something call in ... Please give pt a call back when med is called

## 2024-06-07 NOTE — TELEPHONE ENCOUNTER
Lab work ordered for preoperative examination lab work ordered for preoperative examination lab work ordered

## 2024-06-10 ENCOUNTER — HOSPITAL ENCOUNTER (OUTPATIENT)
Dept: CARDIOLOGY | Facility: HOSPITAL | Age: 84
Discharge: HOME | End: 2024-06-10
Attending: FAMILY MEDICINE
Payer: MEDICARE

## 2024-06-10 ENCOUNTER — HOSPITAL ENCOUNTER (OUTPATIENT)
Dept: RADIOLOGY | Facility: HOSPITAL | Age: 84
Discharge: HOME | End: 2024-06-10
Attending: FAMILY MEDICINE
Payer: MEDICARE

## 2024-06-10 DIAGNOSIS — Z01.818 PREOPERATIVE EXAMINATION: ICD-10-CM

## 2024-06-10 DIAGNOSIS — I48.0 PAROXYSMAL ATRIAL FIBRILLATION: ICD-10-CM

## 2024-06-10 DIAGNOSIS — I10 ESSENTIAL HYPERTENSION: ICD-10-CM

## 2024-06-10 DIAGNOSIS — I70.0 AORTIC ATHEROSCLEROSIS: ICD-10-CM

## 2024-06-10 DIAGNOSIS — I83.93 VARICOSE VEINS OF BOTH LOWER EXTREMITIES, UNSPECIFIED WHETHER COMPLICATED: ICD-10-CM

## 2024-06-10 PROCEDURE — 93005 ELECTROCARDIOGRAM TRACING: CPT | Mod: PN

## 2024-06-10 PROCEDURE — 71046 X-RAY EXAM CHEST 2 VIEWS: CPT | Mod: 26,,, | Performed by: RADIOLOGY

## 2024-06-10 PROCEDURE — 71046 X-RAY EXAM CHEST 2 VIEWS: CPT | Mod: TC,FY,PN

## 2024-06-10 PROCEDURE — 93010 ELECTROCARDIOGRAM REPORT: CPT | Mod: ,,, | Performed by: INTERNAL MEDICINE

## 2024-06-12 LAB
OHS QRS DURATION: 132 MS
OHS QTC CALCULATION: 442 MS

## 2024-06-16 ENCOUNTER — TELEPHONE (OUTPATIENT)
Dept: FAMILY MEDICINE | Facility: CLINIC | Age: 84
End: 2024-06-16
Payer: MEDICARE

## 2024-06-17 ENCOUNTER — TELEPHONE (OUTPATIENT)
Dept: FAMILY MEDICINE | Facility: CLINIC | Age: 84
End: 2024-06-17
Payer: MEDICARE

## 2024-06-17 RX ORDER — NYSTATIN AND TRIAMCINOLONE ACETONIDE 100000; 1 [USP'U]/G; MG/G
CREAM TOPICAL 4 TIMES DAILY
Qty: 60 G | Refills: 1 | Status: SHIPPED | OUTPATIENT
Start: 2024-06-17

## 2024-06-17 NOTE — TELEPHONE ENCOUNTER
Sent in new cream     If expensive we can try another   it is hit are miss re pricing depending on insurance.    I would call and ask the price before coming to get it     If expensive let us know

## 2024-06-17 NOTE — TELEPHONE ENCOUNTER
The nystatin-triamcinolone (MYCOLOG II) cream has gotten denied. The following is a list of approved alternative:    Talk to your prescriber to see if the following covered alternative(s) would be right for you:  Nystatin cream (Quantity limit of 30 grams per 30 days.)  Nystatin ointment (Quantity limit of 30 grams per 30 days.)  Nystatin powder (Quantity limit of 60 grams per 30 days.)  Klayesta powder (Quantity limit of 60 grams per 30 days.)

## 2024-06-17 NOTE — TELEPHONE ENCOUNTER
I have reviewed his lab work-no additional testing  is needed prior to his surgical procedure.    Thank you for taking care of him.

## 2024-06-17 NOTE — TELEPHONE ENCOUNTER
----- Message from Zonia Badillo sent at 6/17/2024  8:31 AM CDT -----  Type:  Needs Medical Advice    Who Called: pt  Would the patient rather a call back or a response via [facility]? call  Best Call Back Number:  [phone]  Additional Information:     pt would like to speak with office regarding getting cream for in between legs says Nizoral 2% has made it worse would like to see about getting different medication    Have you seen this rash?  Do you need to see him or can you send in something else

## 2024-06-17 NOTE — TELEPHONE ENCOUNTER
Initiating PA through [facility] for nystati/triamcinolone 100,000/0.1 units/gm cream

## 2024-06-18 ENCOUNTER — TELEPHONE (OUTPATIENT)
Dept: FAMILY MEDICINE | Facility: CLINIC | Age: 84
End: 2024-06-18
Payer: MEDICARE

## 2024-06-18 RX ORDER — NYSTATIN 100000 U/G
CREAM TOPICAL 2 TIMES DAILY
Qty: 60 G | Refills: 1 | Status: SHIPPED | OUTPATIENT
Start: 2024-06-18

## 2024-06-18 NOTE — TELEPHONE ENCOUNTER
----- Message from Fredis Hector MD sent at 6/16/2024  8:00 PM CDT -----  Your x-ray and other lab work is normal-no additional testing needed prior to your surgery.

## 2024-06-30 ENCOUNTER — EXTERNAL CHRONIC CARE MANAGEMENT (OUTPATIENT)
Dept: PRIMARY CARE CLINIC | Facility: CLINIC | Age: 84
End: 2024-06-30
Payer: MEDICARE

## 2024-06-30 PROCEDURE — 99490 CHRNC CARE MGMT STAFF 1ST 20: CPT | Mod: S$GLB,,, | Performed by: FAMILY MEDICINE

## 2024-07-09 ENCOUNTER — HOSPITAL ENCOUNTER (OUTPATIENT)
Dept: PREADMISSION TESTING | Facility: HOSPITAL | Age: 84
Discharge: HOME | End: 2024-07-09
Attending: NURSE PRACTITIONER
Payer: MEDICARE

## 2024-07-09 NOTE — PRE-PROCEDURE INSTRUCTIONS
Niece.    Allergies, medical, surgical, family and psychosocial histories reviewed with patient. Periop plan of care reviewed. Preop instructions given, including medications to take and to hold. Hibiclens soap and instructions on use given. Time allotted for questions to be addressed.      Arrival time 0730.    Instructed to take Lisinopril the morning of surgery.

## 2024-07-09 NOTE — DISCHARGE INSTRUCTIONS

## 2024-07-22 ENCOUNTER — TELEPHONE (OUTPATIENT)
Dept: NEUROSURGERY | Facility: CLINIC | Age: 84
End: 2024-07-22
Payer: MEDICARE

## 2024-07-29 ENCOUNTER — TELEPHONE (OUTPATIENT)
Dept: FAMILY MEDICINE | Facility: CLINIC | Age: 84
End: 2024-07-29
Payer: MEDICARE

## 2024-07-29 DIAGNOSIS — M54.9 DORSALGIA, UNSPECIFIED: ICD-10-CM

## 2024-07-29 NOTE — TELEPHONE ENCOUNTER
Valery Carrillo Staff  MRN: [id number]  Pt: MARTIN LANGE  Phone: [phone]  [id number]    Good afternoon,    This pt sees Dr. Fredis Hector as PCP. CC contacted Pt and completed monthly call. Pt reports 2 recent falls: 7/19/2024 seen in ER, 7/22/2024 denies being seen in ER. Pt reports attending appt with Dr. Yang following second fall. Pt has fractured nose with laceration healed, laceration on his arm, bruising to both hands, R rotator cuff injury and bruised ribs. CC scheduled PCP appt 8/1/2024 0820. Pt reports taking hydromorphone, Tylenol and ibuprofen for pain relief. Pt is needing refills of amitriptyline and hydromorphone sent to [facility] please. Thank you.         If needed, you may reach me through [employer] or at number below.    Valery Carrillo LPN  Care Coordinator    [facility] [phone] ext. [phone]

## 2024-07-30 RX ORDER — HYDROMORPHONE HYDROCHLORIDE 2 MG/1
TABLET ORAL
Qty: 60 TABLET | Refills: 0 | Status: SHIPPED | OUTPATIENT
Start: 2024-07-30 | End: 2024-08-01

## 2024-07-30 RX ORDER — AMITRIPTYLINE HYDROCHLORIDE 10 MG/1
TABLET, FILM COATED ORAL
Qty: 270 TABLET | Refills: 1 | Status: SHIPPED | OUTPATIENT
Start: 2024-07-30

## 2024-07-31 ENCOUNTER — EXTERNAL CHRONIC CARE MANAGEMENT (OUTPATIENT)
Dept: PRIMARY CARE CLINIC | Facility: CLINIC | Age: 84
End: 2024-07-31
Payer: MEDICARE

## 2024-07-31 PROCEDURE — 99439 CHRNC CARE MGMT STAF EA ADDL: CPT | Mod: ,,, | Performed by: FAMILY MEDICINE

## 2024-07-31 PROCEDURE — 99490 CHRNC CARE MGMT STAFF 1ST 20: CPT | Mod: ,,, | Performed by: FAMILY MEDICINE

## 2024-08-01 ENCOUNTER — OFFICE VISIT (OUTPATIENT)
Dept: FAMILY MEDICINE | Facility: CLINIC | Age: 84
End: 2024-08-01
Payer: MEDICARE

## 2024-08-01 VITALS
TEMPERATURE: 99 F | BODY MASS INDEX: 28.65 KG/M2 | WEIGHT: 189.06 LBS | HEART RATE: 76 BPM | DIASTOLIC BLOOD PRESSURE: 66 MMHG | HEIGHT: 68 IN | OXYGEN SATURATION: 95 % | SYSTOLIC BLOOD PRESSURE: 148 MMHG

## 2024-08-01 DIAGNOSIS — M54.9 DORSALGIA, UNSPECIFIED: ICD-10-CM

## 2024-08-01 DIAGNOSIS — S32.030D COMPRESSION FRACTURE OF L3 VERTEBRA WITH ROUTINE HEALING, SUBSEQUENT ENCOUNTER: ICD-10-CM

## 2024-08-01 DIAGNOSIS — S02.2XXD CLOSED FRACTURE OF NASAL BONE WITH ROUTINE HEALING, SUBSEQUENT ENCOUNTER: Primary | ICD-10-CM

## 2024-08-01 DIAGNOSIS — M19.90 ARTHRITIS: ICD-10-CM

## 2024-08-01 PROCEDURE — 99213 OFFICE O/P EST LOW 20 MIN: CPT | Mod: S$GLB,,, | Performed by: FAMILY MEDICINE

## 2024-08-07 NOTE — TELEPHONE ENCOUNTER
----- Message from Miles Gibbs sent at 11/20/2023  1:08 PM CST -----  Contact: Pt   .Type:  Same Day Appointment Request    Caller is requesting a same day appointment.  Caller declined first available appointment listed below.    Name of Caller:pt  When is the first available appointment?   Symptoms: back pain 1 week  Best Call Back Number: [phone]   Additional Information: pt. States he was seen in the emergency room on 11/04/2023 @ [facility] and received some medication cyclobenzaprine 10 mg Tablets and that is not work 3 times a day.  Pt. Is requesting to be seen today or some type of relief.   [id number]   Phone: [phone]  Fax: [phone]              [Nasal Congestion] : nasal congestion [Sore Throat] : sore throat [Cough] : cough [Congestion] : congestion [Negative] : Genitourinary

## 2024-08-31 ENCOUNTER — EXTERNAL CHRONIC CARE MANAGEMENT (OUTPATIENT)
Dept: PRIMARY CARE CLINIC | Facility: CLINIC | Age: 84
End: 2024-08-31
Payer: MEDICARE

## 2024-08-31 PROCEDURE — 99490 CHRNC CARE MGMT STAFF 1ST 20: CPT | Mod: S$GLB,,, | Performed by: FAMILY MEDICINE

## 2024-09-30 ENCOUNTER — EXTERNAL CHRONIC CARE MANAGEMENT (OUTPATIENT)
Dept: PRIMARY CARE CLINIC | Facility: CLINIC | Age: 84
End: 2024-09-30
Payer: MEDICARE

## 2024-09-30 ENCOUNTER — TELEPHONE (OUTPATIENT)
Dept: FAMILY MEDICINE | Facility: CLINIC | Age: 84
End: 2024-09-30
Payer: MEDICARE

## 2024-09-30 DIAGNOSIS — M54.9 DORSALGIA, UNSPECIFIED: ICD-10-CM

## 2024-09-30 PROCEDURE — 99490 CHRNC CARE MGMT STAFF 1ST 20: CPT | Mod: S$GLB,,, | Performed by: FAMILY MEDICINE

## 2024-09-30 PROCEDURE — 99439 CHRNC CARE MGMT STAF EA ADDL: CPT | Mod: S$GLB,,, | Performed by: FAMILY MEDICINE

## 2024-09-30 RX ORDER — HYDROMORPHONE HYDROCHLORIDE 4 MG/1
TABLET ORAL
Qty: 45 TABLET | Refills: 0 | Status: SHIPPED | OUTPATIENT
Start: 2024-09-30 | End: 2024-09-30

## 2024-09-30 RX ORDER — HYDROMORPHONE HYDROCHLORIDE 2 MG/1
TABLET ORAL
Qty: 60 TABLET | Refills: 0 | Status: SHIPPED | OUTPATIENT
Start: 2024-09-30

## 2024-09-30 NOTE — TELEPHONE ENCOUNTER
"----- Message from Jie sent at 9/30/2024  8:37 AM CDT -----  Regarding: sudheer "delia Worley"  .Type: RX Refill Request    Who Called: sudheer "delia Worley"     Have you contacted your pharmacy:    Refill or New Rx: refill     RX Name and Strength: HYDROmorphone (DILAUDID) 2 MG tablet       Is this a 30 day or 90 day RX:   30 day     Preferred Pharmacy with phone number: .  [id number] [address] [address]  Phone: [phone] Fax: [phone]        Local or Mail Order: local     Would the patient rather a call back or a response via [facility]?  Call back     Best Call Back Number: [phone] ext [phone]  "

## 2024-10-21 RX ORDER — LISINOPRIL 10 MG/1
10 TABLET ORAL DAILY
Qty: 90 TABLET | Refills: 3 | Status: SHIPPED | OUTPATIENT
Start: 2024-10-21

## 2024-10-21 NOTE — TELEPHONE ENCOUNTER
No care due was identified.  [facility] Embedded Care Due Messages. Reference number: [id number].   10/21/2024 9:17:40 AM CDT

## 2024-10-21 NOTE — TELEPHONE ENCOUNTER
----- Message from Lesly sent at 10/21/2024  9:15 AM CDT -----  Type:  RX Refill Request    Who Called: Pt   Refill or New Rx:refill   RX Name and Strength:lisinopriL 10 MG tablet  How is the patient currently taking it? (ex. 1XDay):1x  Is this a 30 day or 90 day RX:90  Preferred Pharmacy with phone number:[id number] - [address]   Phone: [phone]  Fax: [phone]  Would the patient rather a call back or a response via [facility]? Call back   Best Call Back Number:[phone]  Additional Information:

## 2024-10-25 ENCOUNTER — TELEPHONE (OUTPATIENT)
Dept: NEUROSURGERY | Facility: CLINIC | Age: 84
End: 2024-10-25
Payer: MEDICARE

## 2024-10-25 NOTE — TELEPHONE ENCOUNTER
Contacted pt to reschedule appointment with  on Monday, pt stated that he wants to leave the appointment off for right now and voiced understanding.

## 2024-10-31 ENCOUNTER — EXTERNAL CHRONIC CARE MANAGEMENT (OUTPATIENT)
Dept: PRIMARY CARE CLINIC | Facility: CLINIC | Age: 84
End: 2024-10-31
Payer: MEDICARE

## 2024-10-31 ENCOUNTER — HOSPITAL ENCOUNTER (EMERGENCY)
Facility: HOSPITAL | Age: 84
Discharge: HOME | End: 2024-10-31
Attending: EMERGENCY MEDICINE
Payer: MEDICARE

## 2024-10-31 VITALS
TEMPERATURE: 98 F | WEIGHT: 195 LBS | SYSTOLIC BLOOD PRESSURE: 164 MMHG | RESPIRATION RATE: 17 BRPM | OXYGEN SATURATION: 98 % | HEIGHT: 68 IN | HEART RATE: 74 BPM | BODY MASS INDEX: 29.55 KG/M2 | DIASTOLIC BLOOD PRESSURE: 74 MMHG

## 2024-10-31 DIAGNOSIS — S01.01XA LACERATION OF SCALP, INITIAL ENCOUNTER: ICD-10-CM
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
## 2024-10-31 DIAGNOSIS — M54.9 UPPER BACK PAIN: ICD-10-CM

## 2024-10-31 PROCEDURE — 25000003 PHARM REV CODE 250: Performed by: EMERGENCY MEDICINE

## 2024-10-31 PROCEDURE — 96374 THER/PROPH/DIAG INJ IV PUSH: CPT

## 2024-10-31 PROCEDURE — 99490 CHRNC CARE MGMT STAFF 1ST 20: CPT | Mod: S$GLB,,, | Performed by: FAMILY MEDICINE

## 2024-10-31 PROCEDURE — 96376 TX/PRO/DX INJ SAME DRUG ADON: CPT

## 2024-10-31 PROCEDURE — 63600175 PHARM REV CODE 636 W HCPCS: Performed by: EMERGENCY MEDICINE

## 2024-10-31 PROCEDURE — 99285 EMERGENCY DEPT VISIT HI MDM: CPT | Mod: 25

## 2024-10-31 PROCEDURE — 12001 RPR S/N/AX/GEN/TRNK 2.5CM/<: CPT

## 2024-10-31 RX ORDER — FENTANYL CITRATE 50 UG/ML
50 INJECTION, SOLUTION INTRAMUSCULAR; INTRAVENOUS
Status: COMPLETED | OUTPATIENT
Start: 2024-10-31 | End: 2024-10-31

## 2024-10-31 RX ORDER — LIDOCAINE HYDROCHLORIDE 10 MG/ML
5 INJECTION, SOLUTION EPIDURAL; INFILTRATION; INTRACAUDAL; PERINEURAL
Status: COMPLETED | OUTPATIENT
Start: 2024-10-31 | End: 2024-10-31

## 2024-10-31 RX ORDER — ACETAMINOPHEN 325 MG/1
650 TABLET ORAL
Status: COMPLETED | OUTPATIENT
Start: 2024-10-31 | End: 2024-10-31

## 2024-10-31 RX ADMIN — ACETAMINOPHEN 650 MG: 325 TABLET ORAL at 08:10

## 2024-10-31 RX ADMIN — FENTANYL CITRATE 50 MCG: 50 INJECTION, SOLUTION INTRAMUSCULAR; INTRAVENOUS at 08:10

## 2024-10-31 RX ADMIN — FENTANYL CITRATE 50 MCG: 50 INJECTION, SOLUTION INTRAMUSCULAR; INTRAVENOUS at 07:10

## 2024-10-31 RX ADMIN — LIDOCAINE HYDROCHLORIDE 50 MG: 10 INJECTION, SOLUTION EPIDURAL; INFILTRATION; INTRACAUDAL at 08:10

## 2024-10-31 NOTE — ED PROVIDER NOTES
Emergency Department Encounter  Provider Note    Teo Gloria  [id number]  10/31/2024    Evaluation:    History Acquisition:     Chief Complaint   Patient presents with    Fall     Patient fell after taking a shower upstairs, head hit toilet ; when house supervisor arrived - patient head was stuck between toilet and wall. Pain to mid back rated 8/10; Abrasions/skin tears noted; denies LOC, denies blood thinners, Aox4, C-Collar in place       History of Present Illness:  Teo Gloria is a 84 y.o. male who has a past medical history of Hypertension.    The patient presents to the ED due to head injury after fall.   He was visiting his wife in the hospital when he slipped in the bathroom and hit his head on the toilet.  He denies loss of consciousness.  He endorses posterior head and mid back pain.  Denies any upper or lower extremity injury.  No other complaints or concerns    Additional historians utilized:  none    Prior medical records were reviewed:   Derm visit 9/17 for f/u purpura, AK, nevi  FM visit 8/31 for f/u prostate cancer, AS, HTN, OA  Ortho visit 8/27 for f/u R knee pain    The patient's list of active medical history, family/social history, medications, and allergies as documented has been reviewed.     Past Medical History:   Diagnosis Date    Hypertension      Past Surgical History:   Procedure Laterality Date    APPENDECTOMY      BACK SURGERY      CHOLECYSTECTOMY      COLONOSCOPY  01/01/2017    repeat Q 5 years    EYE SURGERY Bilateral     cataract    HERNIA REPAIR      SHOULDER ARTHROSCOPY Bilateral     TOTAL KNEE ARTHROPLASTY Right 1960's     Family History   Problem Relation Name Age of Onset    Cancer Mother      Parkinsonism Father       Social History     Socioeconomic History    Marital status:    Tobacco Use    Smoking status: Never     Passive exposure: Never    Smokeless tobacco: Never   Substance and Sexual Activity    Alcohol use: No    Drug use: No    Sexual activity: Not  Currently       Medications:  Discharge Medication List as of 10/31/2024  8:29 PM        CONTINUE these medications which have NOT CHANGED    Details   abiraterone (ZYTIGA) 250 mg Tab 250 mg 4 tablets in the morning., Starting Thu 7/25/2019, Historical Med      amitriptyline (ELAVIL) 10 MG tablet TAKE 1-3 TABLETS BY MOUTH AT BEDTIME FOR SLEEP, Normal      aspirin (ECOTRIN) 81 MG EC tablet Take 81 mg by mouth once daily., Historical Med      HYDROmorphone (DILAUDID) 2 MG tablet TAKE 1-2 TABLETS BY MOUTH EVERY 6 HOURS AS NEEDED FOR PAIN, Normal      ketoconazole (NIZORAL) 2 % cream Apply topically 2 (two) times daily. To affected area for rash, Starting Thu 6/6/2024, Normal      lisinopriL 10 MG tablet Take 1 tablet (10 mg total) by mouth once daily., Starting Mon 10/21/2024, Normal      nystatin-triamcinolone (MYCOLOG II) cream Apply topically 4 (four) times daily., Starting Mon 6/17/2024, Normal      predniSONE (DELTASONE) 20 MG tablet One tablet daily by mouth for five days, Normal             Allergies:  Review of patient's allergies indicates:   Allergen Reactions    Benadryl [diphenhydramine hcl] Hives    Clindamycin Hives    Trazodone      Diarrhea      Xarelto [rivaroxaban] Swelling     Knee swelling    Hydrocodone-acetaminophen Rash         Physical Exam:     Initial Vitals [10/31/24 1833]   BP Pulse Resp Temp SpO2   (!) 162/71 65 17 97.9 °F (36.6 °C) 95 %      MAP       --         Physical Exam    Nursing note and vitals reviewed.  Constitutional: He appears well-developed and well-nourished. He is not diaphoretic. No distress.   HENT:   Head: Normocephalic. Head is with contusion and with laceration. Mouth/Throat: Oropharynx is clear and moist.   Eyes: EOM are normal. Pupils are equal, round, and reactive to light.   Neck: No tracheal deviation present.   Cardiovascular:  Normal rate, regular rhythm, normal heart sounds and intact distal pulses.           Pulmonary/Chest: Breath sounds normal. No stridor.  No respiratory distress.   Abdominal: Abdomen is soft. He exhibits no distension and no mass. There is no abdominal tenderness.   Musculoskeletal:         General: No edema. Normal range of motion.      Cervical back: No tenderness or bony tenderness.      Thoracic back: Tenderness and bony tenderness present.      Lumbar back: Tenderness and bony tenderness present.        Back:       Comments: Mild, diffuse back tenderness. No deformity.   Full ROM all extremities, no tenderness or deformity.      Neurological: He is alert and oriented to person, place, and time. No cranial nerve deficit or sensory deficit.   Skin: Skin is warm and dry. Capillary refill takes less than 2 seconds. No rash noted.   Psychiatric: He has a normal mood and affect. His behavior is normal. Thought content normal.         Differential Diagnoses:   Based on available information and initial assessment, Differential Diagnosis includes, but is not limited to:  Polytrauma, fall/syncope, traumatic SAH/intracranial bleed, skull/c-spine/facial fracture, concussion, neck injury, chest trauma, intraabdominal bleed, solid organ injury, pelvic fracture, long bone fracture/dislocation, nerve injury/palsy, vascular injury, hemarthrosis, septic joint, osteoarthritis, compartment syndrome, rhabdomyolysis, soft tissue contusion, muscle strain, ligament tear/sprain, foreign body, laceration, abrasion.      ED Management:   Lac Repair    Date/Time: 10/31/2024 8:25 PM    Performed by: Harpal Castaneda MD  Authorized by: Harpal Castaneda MD    Consent:     Consent obtained:  Emergent situation and verbal    Risks discussed:  Poor cosmetic result and infection  Universal protocol:     Patient identity confirmed:  Verbally with patient  Laceration details:     Location:  Scalp    Scalp location:  Occipital    Length (cm):  2.5  Exploration:     Limited defect created (wound extended): no      Wound exploration: wound explored through full range of motion     Treatment:     Amount of cleaning:  Extensive    Irrigation solution:  Sterile water    Irrigation volume:  500    Irrigation method:  Pressure wash    Debridement:  None  Skin repair:     Repair method:  Staples    Number of staples:  2  Approximation:     Approximation:  Close  Repair type:     Repair type:  Simple  Post-procedure details:     Dressing:  Open (no dressing)      Orders Placed This Encounter    LACERATION REPAIR    CT Head Without Contrast    CT Cervical Spine Without Contrast    CT Thoracic Spine Without Contrast    CT Lumbar Spine Without Contrast    Insert peripheral IV    fentaNYL 50 mcg/mL injection 50 mcg    LIDOcaine (PF) 10 mg/ml (1%) injection 50 mg    fentaNYL 50 mcg/mL injection 50 mcg    acetaminophen tablet 650 mg          EKG:       Labs:   Labs Reviewed - No data to display  Independent review of the labs ordered include:   See ED course    Imaging:     Imaging Results              CT Lumbar Spine Without Contrast (Final result)  Result time 10/31/24 20:13:28      Final result by Ji Brar DO (10/31/24 20:13:28)                   Impression:      No acute fracture or subluxation of the lumbar spine.    Remote compression fractures of the L3 and L4 vertebral bodies with unchanged height loss from prior.    Multilevel degenerative changes as detailed above.      Electronically signed by: Ji Brar  Date:    10/31/2024  Time:    20:13               Narrative:    EXAMINATION:  CT LUMBAR SPINE WITHOUT CONTRAST    CLINICAL HISTORY:  Spine fracture, lumbar, traumatic;    TECHNIQUE:  Axial, sagittal, and coronal reformations are obtained through the lumbar spine without intravenous contrast.    COMPARISON:  Radiographs of the lumbar spine from 05/14/2024.    FINDINGS:  Alignment: There is mild grade 1 anterolisthesis of L4 on L5 and mild retrolisthesis of L5 on S1.  Alignment is otherwise unremarkable.    Vertebrae: There is diffuse osteopenia.  There are remote compression  fractures of the L3 and L4 vertebral bodies with unchanged height loss from prior.  No new or additional compression fractures are seen.  The remaining vertebral body heights are maintained. There is no aggressive osseous lesion.    Discs: There is vacuum disc phenomenon at L2-L3 and L3-L4.  There is moderate disc height loss at L5-S1.    Degenerative changes: There are multilevel degenerative changes of the lumbar spine.  At L3-L4 there is severe spinal canal stenosis secondary to facet arthropathy, a circumferential disc bulge, and ligamentum flavum hypertrophy.  At L4-L5 there is severe spinal canal stenosis secondary to a circumferential disc bulge, facet arthropathy, and ligamentum flavum hypertrophy.    Miscellaneous: The paravertebral soft tissues are unremarkable.  There are atherosclerotic calcifications.                                       CT Thoracic Spine Without Contrast (Final result)  Result time 10/31/24 20:05:24      Final result by Ji Brar DO (10/31/24 20:05:24)                   Impression:      1. Age-indeterminate microtrabecular fracture of the T7 vertebral body, recommend correlation with point tenderness.  2. Remote compression fractures of the T6 and the T10 vertebral bodies, with unchanged height loss from prior.      Electronically signed by: Ji Brar  Date:    10/31/2024  Time:    20:05               Narrative:    EXAMINATION:  CT THORACIC SPINE WITHOUT CONTRAST    CLINICAL HISTORY:  Spine fracture, thoracic, traumatic;    TECHNIQUE:  Axial CT images of the thoracic spine with sagittal and coronal reformats without intravenous contrast.    COMPARISON:  PA and lateral chest radiographs from 06/10/2024.    FINDINGS:  Alignment: There is accentuation of the usual thoracic kyphosis.  Alignment is otherwise unremarkable.    Vertebrae: There is diffuse osteopenia.  There are remote compression fractures of the T6 and T10 vertebral bodies, with unchanged height loss when  compared with prior radiograph from 06/10/2024.  There is a microtrabecular fracture of the T7 vertebral body, not definitively seen on prior imaging, without significant vertebral body height loss.  Remaining vertebral body heights are preserved.  No additional fractures are seen.    Discs: Vacuum disc phenomenon noted at T9-T10 and T10-T11.  Disc heights are grossly maintained.    Degenerative changes: There are multilevel degenerative changes of the thoracic spine without evidence of high-grade osseous spinal canal stenosis.    Miscellaneous: There is atelectasis in the right lower lobe.  The heart is enlarged.  There are calcifications of the aortic root and the coronary arteries.  There is atherosclerosis of the aorta.                                       CT Cervical Spine Without Contrast (Final result)  Result time 10/31/24 19:58:03      Final result by Ji Brar DO (10/31/24 19:58:03)                   Impression:      No acute fracture or subluxation of the cervical spine.    Multilevel degenerative changes of the cervical spine.      Electronically signed by: Ji Brar  Date:    10/31/2024  Time:    19:58               Narrative:    EXAMINATION:  CT CERVICAL SPINE WITHOUT CONTRAST    CLINICAL HISTORY:  Neck trauma (Age >= 65y);    TECHNIQUE:  Low dose axial images, sagittal and coronal reformations were performed though the cervical spine without intravenous contrast.    COMPARISON:  None available.    FINDINGS:  Alignment: There is mild anterolisthesis of C4 on C5 and C7 on T1, and mild retrolisthesis of C5 on C6.  Alignment is otherwise normal.    Vertebra: There is diffuse osteopenia.  There is no acute fracture or subluxation of the cervical spine.  The vertebral body heights are maintained.    Discs: Discs are maintained in height.    Degenerative changes: There are multilevel degenerative changes of the cervical spine, without evidence of high-grade osseous spinal canal  stenosis.    Miscellaneous: The soft tissues of the neck are unremarkable.  There is scattered dental amalgam resulting in streak artifact.  There are calcifications of the carotid bifurcations.  The visualized lung apices are clear.                                       CT Head Without Contrast (Final result)  Result time 10/31/24 19:55:16      Final result by Ji Brar DO (10/31/24 19:55:16)                   Impression:      No acute intracranial abnormality.    Small right posterior scalp laceration.      Electronically signed by: Ji Brar  Date:    10/31/2024  Time:    19:55               Narrative:    EXAMINATION:  CT HEAD WITHOUT CONTRAST    CLINICAL HISTORY:  Head trauma, minor (Age >= 65y);    TECHNIQUE:  Low dose axial CT images obtained throughout the head without intravenous contrast. Sagittal and coronal reconstructions were performed.    COMPARISON:  None available.    FINDINGS:  Ventricles and sulci are normal in size for age without evidence of hydrocephalus. No extra-axial blood or fluid collections.  Mild hypoattenuation in the supratentorial white matter, compatible with chronic microvascular ischemic changes.  No parenchymal mass, hemorrhage, edema or major vascular distribution infarct.    No calvarial fracture.  There is a small right posterior scalp laceration.  Bilateral paranasal sinuses and mastoid air cells are clear.  There are bilateral prosthetic lenses.                                    X-Rays:   Independently Interpreted Readings:   Other Readings:  CT head independently interpreted: no intracranial hemorrhage, mass effect, or midline shift. Superficial contusion.  Agree with radiologist interpretation.     CT C-spine independently interpreted: no fracture or significant subluxation. Degenerative changes.   Agree with radiologist interpretation.     CT T-spine independently interpreted: remote compression fractures, age-indeterminate T7 fracture without significant  height loss.   Agree with radiologist interpretation.     CT L-spine independently interpreted: no acute fracture or significant subluxation, remote compression fractures, degenerative changes.  Agree with radiologist interpretation.         Medications Given:     Medications   fentaNYL 50 mcg/mL injection 50 mcg (50 mcg Intravenous Given 10/31/24 1901)   LIDOcaine (PF) 10 mg/ml (1%) injection 50 mg (50 mg Infiltration Given 10/31/24 2012)   fentaNYL 50 mcg/mL injection 50 mcg (50 mcg Intravenous Given 10/31/24 2012)   acetaminophen tablet 650 mg (650 mg Oral Given 10/31/24 2012)        Medical Decision Making:    Additional Consideration:   Additional testing considered during clinical course: none    Social determinants of health considered during development of treatment plan include: poor access to care    Current co-morbidities considered which impacted clinical decision making: HTN, a-fib    Case discussed with additional provider: none    ED Course as of 11/01/24 0934   u Oct 31, 2024   1850 SpO2: 95 % [SS]   1850 Resp: 17 [SS]   1850 Pulse: 65 [SS]   1850 Temp Source: Oral [SS]   1850 Temp: 97.9 °F (36.6 °C) [SS]   1850 BP(!): 162/71  Hypertensive, vitals otherwise reassuring  [SS]      ED Course User Index  [SS] Harpal Castaneda MD            Medical Decision Making  83 yo M with mechanical fall. Exam with posterior scalp contusion and laceration.   CT imaging without clinically significant injuries.   Scalp laceration repaired with staples.   Counseled on wound care and f/u for staple removal in 5-7 days.   He is prescribed Dilaudid tablets by his PCP for pain as needed, no additional pain medications necessary.   Patient stable for D/C, and he is eager to return to his wife's room to keep her company while she is admitted to the hospital.     Problems Addressed:  Fall, initial encounter: acute illness or injury  Laceration of scalp, initial encounter: acute illness or injury  Upper back pain: acute  illness or injury    Amount and/or Complexity of Data Reviewed  External Data Reviewed: notes.  Radiology: ordered and independent interpretation performed. Decision-making details documented in ED Course.    Risk  Parenteral controlled substances.  Diagnosis or treatment significantly limited by social determinants of health.        Clinical Impression:       ICD-10-CM ICD-9-CM   1. Fall, initial encounter  W19.XXXA E888.9   2. Laceration of scalp, initial encounter  S01.01XA 873.0   3. Upper back pain  M54.9 724.5         Follow-up Information       Follow up With Specialties Details Why Contact Info    Fredis Hector MD Family Medicine Schedule an appointment as soon as possible for a visit   [address] [facility] [id number] [phone]               ED Disposition Condition    Discharge Stable              On re-evaluation, the patient's status has improved.  PCP follow-up as soon as possible was recommended.    After taking into careful account the patient's history, physical exam findings, as well as empirical and objective data obtained throughout ED workup, I feel no emergent medical condition has been identified. No further evaluation or admission was felt to be required, and the patient is stable for discharge from the ED. The patient and any additional family present were updated with test results, overall clinical impression, and recommended further plan of care, including discharge instructions as provided and outpatient follow-up for continued evaluation and management as needed. All questions were answered. The patient expressed understanding and agreed with current plan for discharge and follow-up plan of care. Strict ED return precautions were provided, including return/worsening of current symptoms, new symptoms, or any other concerns.       Harpal Castaneda MD  11/01/24 0980

## 2024-10-31 NOTE — ED NOTES
Patient presents to the ED after falling while showering. Patient was visiting his wife upstairs, states he tried to grab railing and ended up falling. Patient explains that he hit his head on the toilet and then became stuck with head in between the toilet and the wall. Patient has skin tear to left hand. Bleeding noted to be coming from back; however, did not turn patient at this time for further assessment. Patient c/o pain to entire back; especially mid back. C-collar in place. Patient denies LOC. Denies blood thinners. Alert and oriented. Vitally stable. Awaiting orders. Safety intact. Call light in reach. Care ongoing.

## 2024-11-01 ENCOUNTER — PATIENT OUTREACH (OUTPATIENT)
Dept: EMERGENCY MEDICINE | Facility: HOSPITAL | Age: 84
End: 2024-11-01
Payer: MEDICARE

## 2024-11-01 NOTE — DISCHARGE INSTRUCTIONS
Keep staples and wound clean and dry.  Avoid submersion underwater.  Monitor the wound regularly for any evidence of infection, including redness, drainage, increasing pain, or fever.   Follow-up with your PCP or return to ER as directed for wound re-check and suture/staple removal.      Thank you for choosing [facility]!     Our goal in the Emergency Department is to always provide outstanding medical care. You may receive a survey by mail or e-mail in the next week regarding your experience today. We would greatly appreciate you completing and returning the survey. Your feedback provides us with a way to recognize our staff who provide very good care, and it helps us learn how to improve when your experience was below our aspiration of excellence.      It is important to remember that some problems are difficult to diagnose and may not be found during your first visit. Be sure to follow up with your primary care doctor and review any labs/imaging that was performed during your visit with them. If you do not have a primary care doctor, you may contact the one listed on your discharge paperwork, or you may also call the [facility] Appointment Desk at [phone] to schedule an appointment.     All medications may potentially have side effects and it is impossible to predict which medications may give you side effects. If you feel that you are having a negative effect of any medication you should immediately stop taking them and seek medical attention.  Do not drive or make any important decisions for 24 hours if you have received any pain medications, sedatives or mood altering drugs during your ER visit.    We appreciate you trusting us with your medical care. We will be happy to take care of you for all of your future medical needs. You may return to the ER at any time for any new/concerning symptoms, worsening condition, or failure to improve. We hope you feel better soon.      Sincerely,    Harpal Castaneda Jr., MD  Board-Certified Emergency Medicine Physician  [facility]

## 2024-11-01 NOTE — ED NOTES
Patient provided with and educated on discharge instructions and follow up care. Wound care complete. Patient verbalized understanding and tolerated well. Advised to return to ED as needed. Stable upon departure and wheeled back to wife's room per RN.

## 2024-11-01 NOTE — ED NOTES
C-Collar removed per MD. Upon assessment, patient noted to laceration to back of head. Slight bleeding noted at this time. Patient denies pain to head and just states it is located to mid back currently. Medicated per MAR. Updated on upcoming imaging. VU. Care ongoing.

## 2024-11-05 ENCOUNTER — TELEPHONE (OUTPATIENT)
Dept: FAMILY MEDICINE | Facility: CLINIC | Age: 84
End: 2024-11-05
Payer: MEDICARE

## 2024-11-05 NOTE — TELEPHONE ENCOUNTER
[facility] Staff  Phone Number: [phone]     Good Afternoon    Patient fell while taking a shower at hospital, Wife is admitted to [facility] in [place]. While in shower patient fell back and hit his head on toilet. Patient had 2 staples put in head and X-Ray done to back but no damage was seen. Patient was seen through ER department at hospital and recommended to be admitted for observation but declined. Patient has since been sleeping in recliner at home due to back complaints. Patient did have some swelling yesterday in lower extremities but went away after taking fluid medication.  Patient son thinks patient doesn't need the pain medication that was prescribed and is only giving patient one pill a day and patient is in excruciating per niece. Niece is wanting to see if provider can come in to do a home visit on patient. Fall risk assessment done patient scored 8.      Thank you for allowing us to be a part of the patient's care team. Please let me know if I could be of any assistance.        Respectfully,  Kiki HINKLE LVN  Care Coordinator/[facility] [place]  [phone] ext [phone]  C/O Dolores Odell LPN  Nurse Manager/Care Coordinator  [facility] [place]  [phone]

## 2024-11-05 NOTE — TELEPHONE ENCOUNTER
----- Message from Tameka sent at 11/5/2024  8:53 AM CST -----  Type:  Appointment Request    Name of Caller:Pts daughter Qing     When is the first available appointment?11/05    Symptoms:ED f/u     Would the patient rather a call back or a response via [facility]? Call back     Best Call Back Number:[phone]    Additional Information: patients daughter states the patient was seen in the ED 11/01. Daughter states the patient needs to schedule an appointment for a follow up as soon as possible. Patients daughter states she would like to have the patient scheduled for an appointment for 11/08 specifically as that is when the patient will be able to make the appointment. Patients daughter states the patients wife was also in the hospital so they have a lot going on at one time. Patient daughter would like a call back with further assistance and more information.

## 2024-11-06 ENCOUNTER — NURSE TRIAGE (OUTPATIENT)
Dept: ADMINISTRATIVE | Facility: CLINIC | Age: 84
End: 2024-11-06
Payer: MEDICARE

## 2024-11-06 NOTE — TELEPHONE ENCOUNTER
He should take the pain medication if he is in pain      I do not think I can get there this week or next week.  I am strapped this week and on vacation next week

## 2024-11-06 NOTE — TELEPHONE ENCOUNTER
Maru Morris RN  [facility] Staff2 hours ago (1:35 PM)     Spoke with pt who states that he had an accident where he fell in the shower while at [facility]. States that he hit his head on toilet, and hurt his back. States that he was sent down to ER where imaging was done. States that back pain is still present, and worse. Advised will send message to office, and should have call back within 1 hour. Pt verbalized understanding. Please follow up as soon as possible. Pt asking to called on cell phone at [phone]. Thank you

## 2024-11-06 NOTE — TELEPHONE ENCOUNTER
Spoke with pt who states that he had an accident where he fell in the shower while at [facility]. States that he hit his head on toilet, and hurt his back. States that he was sent down to ER where imaging was done. States that back pain is still present, and worse. Advised will send message to office, and should have call back within 1 hour. Pt verbalized understanding.    Reason for Disposition   Condition / symptoms WORSE    Additional Information   Negative: Sounds like a life-threatening emergency to the triager   Negative: Patient sounds very sick or weak to the triager   Negative: Sounds like a serious complication to the triager    Protocols used: Post-Hospitalization Follow-up Call-A-[facility]

## 2024-11-06 NOTE — TELEPHONE ENCOUNTER
Cassandra with [facility] ( the pt wife  nurse ) called   Patient is taking the dilaudid  1 does not help   2 makes him too groggy  In the recliner and can't really get up  Bc of the pain  - she said terrible pain  He looks miserable     Fell 6 days ago  Cassandra removed the 2 olivier in his head today    He is requesting more test   Cassandra asked if maybe PT would help but it would have to be with HH   He can't get out at this point    If we order HH with Family   He would have to see the MD     I can ask Kylie if she could do house call if you think that is needed  Just talk with me about this

## 2024-11-07 ENCOUNTER — HOSPITAL ENCOUNTER (EMERGENCY)
Facility: HOSPITAL | Age: 84
Discharge: HOME | End: 2024-11-08
Attending: EMERGENCY MEDICINE
Payer: MEDICARE

## 2024-11-07 ENCOUNTER — OFFICE VISIT (OUTPATIENT)
Dept: FAMILY MEDICINE | Facility: CLINIC | Age: 84
End: 2024-11-07
Payer: MEDICARE

## 2024-11-07 ENCOUNTER — TELEPHONE (OUTPATIENT)
Dept: FAMILY MEDICINE | Facility: CLINIC | Age: 84
End: 2024-11-07
Payer: MEDICARE

## 2024-11-07 VITALS
HEIGHT: 68 IN | TEMPERATURE: 98 F | DIASTOLIC BLOOD PRESSURE: 62 MMHG | HEART RATE: 85 BPM | OXYGEN SATURATION: 90 % | BODY MASS INDEX: 29.65 KG/M2 | SYSTOLIC BLOOD PRESSURE: 122 MMHG

## 2024-11-07 DIAGNOSIS — M54.9 MID-BACK PAIN, ACUTE: ICD-10-CM

## 2024-11-07 DIAGNOSIS — R07.89 CHEST DISCOMFORT: ICD-10-CM
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
## 2024-11-07 DIAGNOSIS — S20.211A CONTUSION OF RIB ON RIGHT SIDE, INITIAL ENCOUNTER: ICD-10-CM

## 2024-11-07 DIAGNOSIS — I48.0 PAROXYSMAL ATRIAL FIBRILLATION: ICD-10-CM

## 2024-11-07 DIAGNOSIS — R10.9 ABDOMINAL DISCOMFORT: Primary | ICD-10-CM

## 2024-11-07 DIAGNOSIS — E87.6 HYPOKALEMIA: ICD-10-CM

## 2024-11-07 DIAGNOSIS — R06.02 SOB (SHORTNESS OF BREATH): Primary | ICD-10-CM

## 2024-11-07 LAB
ALBUMIN SERPL BCP-MCNC: 3.2 G/DL (ref 3.5–5.2)
ALP SERPL-CCNC: 83 U/L (ref 40–150)
ALT SERPL W/O P-5'-P-CCNC: 14 U/L (ref 10–44)
ANION GAP SERPL CALC-SCNC: 14 MMOL/L (ref 8–16)
AST SERPL-CCNC: 18 U/L (ref 10–40)
BACTERIA #/AREA URNS HPF: ABNORMAL /HPF
BASOPHILS # BLD AUTO: 0.08 K/UL (ref 0–0.2)
BASOPHILS NFR BLD: 1 % (ref 0–1.9)
BILIRUB SERPL-MCNC: 1.1 MG/DL (ref 0.1–1)
BILIRUB UR QL STRIP: NEGATIVE
BUN SERPL-MCNC: 25 MG/DL (ref 8–23)
CALCIUM SERPL-MCNC: 9.5 MG/DL (ref 8.7–10.5)
CHLORIDE SERPL-SCNC: 102 MMOL/L (ref 95–110)
CLARITY UR: CLEAR
CO2 SERPL-SCNC: 24 MMOL/L (ref 23–29)
COLOR UR: YELLOW
CREAT SERPL-MCNC: 0.9 MG/DL (ref 0.5–1.4)
DIFFERENTIAL METHOD BLD: ABNORMAL
EOSINOPHIL # BLD AUTO: 0.4 K/UL (ref 0–0.5)
EOSINOPHIL NFR BLD: 5.2 % (ref 0–8)
ERYTHROCYTE [DISTWIDTH] IN BLOOD BY AUTOMATED COUNT: 13.1 % (ref 11.5–14.5)
EST. GFR  (NO RACE VARIABLE): >60 ML/MIN/1.73 M^2
GLUCOSE SERPL-MCNC: 100 MG/DL (ref 70–110)
GLUCOSE UR QL STRIP: NEGATIVE
HCT VFR BLD AUTO: 35.6 % (ref 40–54)
HGB BLD-MCNC: 12 G/DL (ref 14–18)
HGB UR QL STRIP: ABNORMAL
HYALINE CASTS #/AREA URNS LPF: 0 /LPF
IMM GRANULOCYTES # BLD AUTO: 0.04 K/UL (ref 0–0.04)
IMM GRANULOCYTES NFR BLD AUTO: 0.5 % (ref 0–0.5)
KETONES UR QL STRIP: ABNORMAL
LEUKOCYTE ESTERASE UR QL STRIP: NEGATIVE
LIPASE SERPL-CCNC: 6 U/L (ref 4–60)
LYMPHOCYTES # BLD AUTO: 1.4 K/UL (ref 1–4.8)
LYMPHOCYTES NFR BLD: 17.2 % (ref 18–48)
MAGNESIUM SERPL-MCNC: 1.9 MG/DL (ref 1.6–2.6)
MCH RBC QN AUTO: 29.3 PG (ref 27–31)
MCHC RBC AUTO-ENTMCNC: 33.7 G/DL (ref 32–36)
MCV RBC AUTO: 87 FL (ref 82–98)
MICROSCOPIC COMMENT: ABNORMAL
MONOCYTES # BLD AUTO: 0.8 K/UL (ref 0.3–1)
MONOCYTES NFR BLD: 9.6 % (ref 4–15)
NEUTROPHILS # BLD AUTO: 5.3 K/UL (ref 1.8–7.7)
NEUTROPHILS NFR BLD: 66.5 % (ref 38–73)
NITRITE UR QL STRIP: NEGATIVE
NRBC BLD-RTO: 0 /100 WBC
PH UR STRIP: 7 [PH] (ref 5–8)
PLATELET # BLD AUTO: 259 K/UL (ref 150–450)
PMV BLD AUTO: 10.2 FL (ref 9.2–12.9)
POTASSIUM SERPL-SCNC: 2.7 MMOL/L (ref 3.5–5.1)
PROT SERPL-MCNC: 6.9 G/DL (ref 6–8.4)
PROT UR QL STRIP: ABNORMAL
RBC # BLD AUTO: 4.09 M/UL (ref 4.6–6.2)
RBC #/AREA URNS HPF: 5 /HPF (ref 0–4)
SODIUM SERPL-SCNC: 140 MMOL/L (ref 136–145)
SP GR UR STRIP: >1.03 (ref 1–1.03)
SQUAMOUS #/AREA URNS HPF: 0 /HPF
TROPONIN I SERPL DL<=0.01 NG/ML-MCNC: 0.05 NG/ML (ref 0–0.03)
TROPONIN I SERPL DL<=0.01 NG/ML-MCNC: 0.06 NG/ML (ref 0–0.03)
URN SPEC COLLECT METH UR: ABNORMAL
UROBILINOGEN UR STRIP-ACNC: ABNORMAL EU/DL
WBC # BLD AUTO: 7.91 K/UL (ref 3.9–12.7)
WBC #/AREA URNS HPF: 1 /HPF (ref 0–5)

## 2024-11-07 PROCEDURE — 85025 COMPLETE CBC W/AUTO DIFF WBC: CPT

## 2024-11-07 PROCEDURE — 25500020 PHARM REV CODE 255

## 2024-11-07 PROCEDURE — 99285 EMERGENCY DEPT VISIT HI MDM: CPT | Mod: 25

## 2024-11-07 PROCEDURE — 96374 THER/PROPH/DIAG INJ IV PUSH: CPT | Mod: 59

## 2024-11-07 PROCEDURE — 81000 URINALYSIS NONAUTO W/SCOPE: CPT

## 2024-11-07 PROCEDURE — 25000003 PHARM REV CODE 250

## 2024-11-07 PROCEDURE — 63600175 PHARM REV CODE 636 W HCPCS

## 2024-11-07 PROCEDURE — 93005 ELECTROCARDIOGRAM TRACING: CPT

## 2024-11-07 PROCEDURE — 84484 ASSAY OF TROPONIN QUANT: CPT | Performed by: EMERGENCY MEDICINE

## 2024-11-07 PROCEDURE — 96376 TX/PRO/DX INJ SAME DRUG ADON: CPT | Mod: 59

## 2024-11-07 PROCEDURE — 84484 ASSAY OF TROPONIN QUANT: CPT | Mod: 91

## 2024-11-07 PROCEDURE — 99215 OFFICE O/P EST HI 40 MIN: CPT | Mod: S$GLB,,, | Performed by: PHYSICIAN ASSISTANT

## 2024-11-07 PROCEDURE — 83735 ASSAY OF MAGNESIUM: CPT

## 2024-11-07 PROCEDURE — 83690 ASSAY OF LIPASE: CPT

## 2024-11-07 PROCEDURE — 80053 COMPREHEN METABOLIC PANEL: CPT

## 2024-11-07 PROCEDURE — 80074 ACUTE HEPATITIS PANEL: CPT

## 2024-11-07 PROCEDURE — 93010 ELECTROCARDIOGRAM REPORT: CPT | Mod: ,,, | Performed by: INTERNAL MEDICINE

## 2024-11-07 RX ORDER — MORPHINE SULFATE 4 MG/ML
4 INJECTION, SOLUTION INTRAMUSCULAR; INTRAVENOUS
Status: COMPLETED | OUTPATIENT
Start: 2024-11-07 | End: 2024-11-07

## 2024-11-07 RX ORDER — POTASSIUM CHLORIDE 20 MEQ/1
40 TABLET, EXTENDED RELEASE ORAL
Status: COMPLETED | OUTPATIENT
Start: 2024-11-07 | End: 2024-11-07

## 2024-11-07 RX ADMIN — MORPHINE SULFATE 4 MG: 4 INJECTION INTRAVENOUS at 10:11

## 2024-11-07 RX ADMIN — MORPHINE SULFATE 4 MG: 4 INJECTION INTRAVENOUS at 07:11

## 2024-11-07 RX ADMIN — IOHEXOL 150 ML: 350 INJECTION, SOLUTION INTRAVENOUS at 09:11

## 2024-11-07 RX ADMIN — POTASSIUM CHLORIDE 40 MEQ: 1500 TABLET, EXTENDED RELEASE ORAL at 08:11

## 2024-11-07 NOTE — TELEPHONE ENCOUNTER
Called patient twice to offer appt with FIORDALIZA Espinal; no answer; no vm    ----- Message from Sarina sent at 11/7/2024 11:16 AM CST -----  Regarding: Call back  Contact: [phone]  Type:  Same Day Appointment Request    Caller is requesting a same day appointment.  Caller declined first available appointment listed below.    Name of Caller: PT   When is the first available appointment?   Symptoms: Severe back pain due to a fall in [facility] [address] and is not able to sleep due to the pain and also he hit the back of his head   Best Call Back Number: [phone]  Additional Information:

## 2024-11-07 NOTE — FIRST PROVIDER EVALUATION
Emergency Department TeleTriage Encounter Note      CHIEF COMPLAINT    Chief Complaint   Patient presents with    Weakness     Patient was seen in ED after falling in shower upstairs while visiting wife; pt explains that since going home he has had generalized weakness and increased pain to entirety of back; worse to middle of back and across chest into both shoulders ; patient states he in unable to get up and get around like normal ; pt saw PCP prior to visit today and was referred to ED for further evaluation       VITAL SIGNS   Initial Vitals [11/07/24 1458]   BP Pulse Resp Temp SpO2   135/84 77 17 98.2 °F (36.8 °C) 96 %      MAP       --            ALLERGIES    Review of patient's allergies indicates:   Allergen Reactions    Benadryl [diphenhydramine hcl] Hives    Clindamycin Hives    Trazodone      Diarrhea      Xarelto [rivaroxaban] Swelling     Knee swelling    Hydrocodone-acetaminophen Rash       PROVIDER TRIAGE NOTE  Verbal consent for the teletriage evaluation was given by the patient at the start of the evaluation.  All efforts will be made to maintain patient's privacy during the evaluation.      This is a teletriage evaluation of a 84 y.o. male presenting to the ED with c/o fell 1 week ago, hit head on toilet and injured back. Seen on 10/31/2024 and had multiple CT scans that were negative for any acute process.  Limited physical exam via telehealth: The patient is awake, alert, answering questions appropriately and is not in respiratory distress.  As the Teletriage provider, I performed an initial assessment and ordered appropriate labs and imaging studies, if any, to facilitate the patient's care once placed in the ED. Once a room is available, care and a full evaluation will be completed by an alternate ED provider.  Any additional orders and the final disposition will be determined by that provider.  All imaging and labs will not be followed-up by the Teletriage Team, including myself.          ORDERS  Labs Reviewed - No data to display    ED Orders (720h ago, onward)      None              Virtual Visit Note: The provider triage portion of this emergency department evaluation and documentation was performed via [facility], a HIPAA-compliant telemedicine application, in concert with a tele-presenter in the room. A face to face patient evaluation with one of my colleagues will occur once the patient is placed in an emergency department room.      DISCLAIMER: This note was prepared with [employer] voice recognition transcription software. Garbled syntax, mangled pronouns, and other bizarre constructions may be attributed to that software system.

## 2024-11-07 NOTE — PROGRESS NOTES
Patient ID: Teo Gloria is a 84 y.o. male.     Chief Complaint: Fall    Mr. 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    He reports falling indoors approximately one week ago at an [facility] facility while helping care for his wife there. Pt was examined in the ED where CTs of the head, neck, and back (not including lower back) were performed at that time. Age indeterminate T-spine fracture as well as remote compression fracture T-spine and L-spine. He expresses interest in further imaging to assess his back condition, particularly the middle back area where he is experiencing discomfort.    He reports mid-back pain and a burning sensation in his chest. He states his breathing feels abnormal, though he is able to breathe. He denies any changes in bowel movements but reports decreased appetite.    He can only stand for approximately 30 minutes before experiencing discomfort. He estimates that he can now perform about 50% of the activities he was previously able to do. 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 He can now only ambulate with use of rolling walker.          Review of Systems  Review of Systems   Constitutional:  Negative for fever.   HENT:  Negative for ear pain and sinus pain.    Eyes:  Negative for discharge.   Respiratory:  Positive for shortness of breath. Negative for cough and wheezing.    Cardiovascular:  Positive for chest pain. Negative for leg swelling.   Gastrointestinal:  Negative for diarrhea, nausea and vomiting.   Genitourinary:  Negative for urgency.   Musculoskeletal:  Positive for back pain. Negative for myalgias.   Skin:  Negative for rash.   Neurological:  Negative for weakness and headaches.   Psychiatric/Behavioral:  Negative for depression.        Currently Medications  Current Outpatient Medications on File Prior to Visit   Medication Sig Dispense Refill     "abiraterone (ZYTIGA) 250 mg Tab 250 mg 4 tablets in the morning.      amitriptyline (ELAVIL) 10 MG tablet TAKE 1-3 TABLETS BY MOUTH AT BEDTIME FOR SLEEP 270 tablet 1    aspirin (ECOTRIN) 81 MG EC tablet Take 81 mg by mouth once daily.      HYDROmorphone (DILAUDID) 2 MG tablet TAKE 1-2 TABLETS BY MOUTH EVERY 6 HOURS AS NEEDED FOR PAIN 60 tablet 0    ketoconazole (NIZORAL) 2 % cream Apply topically 2 (two) times daily. To affected area for rash 60 g 1    lisinopriL 10 MG tablet Take 1 tablet (10 mg total) by mouth once daily. 90 tablet 3    nystatin-triamcinolone (MYCOLOG II) cream Apply topically 4 (four) times daily. 60 g 1    predniSONE (DELTASONE) 20 MG tablet One tablet daily by mouth for five days 5 tablet 0     No current facility-administered medications on file prior to visit.       Physical  Exam  Vitals:    11/07/24 1352   BP: 122/62   BP Location: Right arm   Patient Position: Sitting   Pulse: 85   Temp: 97.9 °F (36.6 °C)   SpO2: (!) 90%   Weight: Comment: unable to obtain-post fall   Height: 5' 8" (1.727 m)      Body mass index is 29.65 kg/m².  Wt Readings from Last 3 Encounters:   10/31/24 88.5 kg (195 lb)   08/01/24 85.7 kg (189 lb 0.7 oz)   05/14/24 89.5 kg (197 lb 5 oz)       Physical Exam  Vitals and nursing note reviewed.   Constitutional:       General: He is not in acute distress.     Appearance: He is not ill-appearing.      Comments: Ambulates with rolling walker   HENT:      Head: Normocephalic and atraumatic.      Right Ear: External ear normal.      Left Ear: External ear normal.      Nose: Nose normal.      Mouth/Throat:      Mouth: Mucous membranes are moist.   Eyes:      Extraocular Movements: Extraocular movements intact.      Conjunctiva/sclera: Conjunctivae normal.   Cardiovascular:      Rate and Rhythm: Normal rate. Rhythm irregularly irregular.      Pulses: Normal pulses.      Heart sounds: No murmur heard.  Pulmonary:      Effort: Pulmonary effort is normal. No respiratory distress. " "     Breath sounds: Examination of the left-lower field reveals decreased breath sounds. Decreased breath sounds present. No wheezing.   Abdominal:      General: There is no distension.      Palpations: Abdomen is soft. There is no mass.      Tenderness: There is no abdominal tenderness.   Musculoskeletal:         General: No swelling.      Cervical back: Normal range of motion.      Thoracic back: Deformity (kyphosis) and bony tenderness (midline) present.   Skin:     Coloration: Skin is not jaundiced.      Findings: No rash.      Comments: Multiple bruises and abrasions to bilateral upper extremities from recent fall. Bruises to thorax from recent fall.   Neurological:      General: No focal deficit present.      Mental Status: He is alert and oriented to person, place, and time.   Psychiatric:         Mood and Affect: Mood normal.         Thought Content: Thought content normal.         Labs:    Complete Blood Count  Lab Results   Component Value Date    RBC 3.89 (L) 06/10/2024    HGB 11.9 (L) 06/10/2024    HCT 36.2 (L) 06/10/2024    MCV 93 06/10/2024    MCH 30.6 06/10/2024    MCHC 32.9 06/10/2024    RDW 12.9 06/10/2024     06/10/2024    MPV 10.4 06/10/2024    GRAN 2.7 06/10/2024    GRAN 51.1 06/10/2024    LYMPH 1.7 06/10/2024    LYMPH 31.8 06/10/2024    MONO 0.5 06/10/2024    MONO 9.8 06/10/2024    EOS 0.3 06/10/2024    BASO 0.07 06/10/2024    EOSINOPHIL 5.1 06/10/2024    BASOPHIL 1.3 06/10/2024    DIFFMETHOD Automated 06/10/2024       Comprehensive Metabolic Panel  Lab Results   Component Value Date    GLU 87 06/10/2024    BUN 20 06/10/2024    CREATININE 0.96 06/10/2024     06/10/2024    K 3.5 06/10/2024     06/10/2024    PROT 6.3 06/10/2024    ALBUMIN 3.7 06/10/2024    BILITOT 0.8 06/10/2024    AST 24 06/10/2024    ALKPHOS 46 06/10/2024    CO2 26 06/10/2024    ALT 13 06/10/2024    ANIONGAP 7 (L) 06/10/2024       TSH  No results found for: "TSH"    Imaging:  CT Lumbar Spine Without " Contrast  Narrative: EXAMINATION:  CT LUMBAR SPINE WITHOUT CONTRAST    CLINICAL HISTORY:  Spine fracture, lumbar, traumatic;    TECHNIQUE:  Axial, sagittal, and coronal reformations are obtained through the lumbar spine without intravenous contrast.    COMPARISON:  Radiographs of the lumbar spine from 05/14/2024.    FINDINGS:  Alignment: There is mild grade 1 anterolisthesis of L4 on L5 and mild retrolisthesis of L5 on S1.  Alignment is otherwise unremarkable.    Vertebrae: There is diffuse osteopenia.  There are remote compression fractures of the L3 and L4 vertebral bodies with unchanged height loss from prior.  No new or additional compression fractures are seen.  The remaining vertebral body heights are maintained. There is no aggressive osseous lesion.    Discs: There is vacuum disc phenomenon at L2-L3 and L3-L4.  There is moderate disc height loss at L5-S1.    Degenerative changes: There are multilevel degenerative changes of the lumbar spine.  At L3-L4 there is severe spinal canal stenosis secondary to facet arthropathy, a circumferential disc bulge, and ligamentum flavum hypertrophy.  At L4-L5 there is severe spinal canal stenosis secondary to a circumferential disc bulge, facet arthropathy, and ligamentum flavum hypertrophy.    Miscellaneous: The paravertebral soft tissues are unremarkable.  There are atherosclerotic calcifications.  Impression: No acute fracture or subluxation of the lumbar spine.    Remote compression fractures of the L3 and L4 vertebral bodies with unchanged height loss from prior.    Multilevel degenerative changes as detailed above.    Electronically signed by: Ji Brar  Date:    10/31/2024  Time:    20:13  CT Thoracic Spine Without Contrast  Narrative: EXAMINATION:  CT THORACIC SPINE WITHOUT CONTRAST    CLINICAL HISTORY:  Spine fracture, thoracic, traumatic;    TECHNIQUE:  Axial CT images of the thoracic spine with sagittal and coronal reformats without intravenous  contrast.    COMPARISON:  PA and lateral chest radiographs from 06/10/2024.    FINDINGS:  Alignment: There is accentuation of the usual thoracic kyphosis.  Alignment is otherwise unremarkable.    Vertebrae: There is diffuse osteopenia.  There are remote compression fractures of the T6 and T10 vertebral bodies, with unchanged height loss when compared with prior radiograph from 06/10/2024.  There is a microtrabecular fracture of the T7 vertebral body, not definitively seen on prior imaging, without significant vertebral body height loss.  Remaining vertebral body heights are preserved.  No additional fractures are seen.    Discs: Vacuum disc phenomenon noted at T9-T10 and T10-T11.  Disc heights are grossly maintained.    Degenerative changes: There are multilevel degenerative changes of the thoracic spine without evidence of high-grade osseous spinal canal stenosis.    Miscellaneous: There is atelectasis in the right lower lobe.  The heart is enlarged.  There are calcifications of the aortic root and the coronary arteries.  There is atherosclerosis of the aorta.  Impression: 1. Age-indeterminate microtrabecular fracture of the T7 vertebral body, recommend correlation with point tenderness.  2. Remote compression fractures of the T6 and the T10 vertebral bodies, with unchanged height loss from prior.    Electronically signed by: Ji Brar  Date:    10/31/2024  Time:    20:05  CT Cervical Spine Without Contrast  Narrative: EXAMINATION:  CT CERVICAL SPINE WITHOUT CONTRAST    CLINICAL HISTORY:  Neck trauma (Age >= 65y);    TECHNIQUE:  Low dose axial images, sagittal and coronal reformations were performed though the cervical spine without intravenous contrast.    COMPARISON:  None available.    FINDINGS:  Alignment: There is mild anterolisthesis of C4 on C5 and C7 on T1, and mild retrolisthesis of C5 on C6.  Alignment is otherwise normal.    Vertebra: There is diffuse osteopenia.  There is no acute fracture or  subluxation of the cervical spine.  The vertebral body heights are maintained.    Discs: Discs are maintained in height.    Degenerative changes: There are multilevel degenerative changes of the cervical spine, without evidence of high-grade osseous spinal canal stenosis.    Miscellaneous: The soft tissues of the neck are unremarkable.  There is scattered dental amalgam resulting in streak artifact.  There are calcifications of the carotid bifurcations.  The visualized lung apices are clear.  Impression: No acute fracture or subluxation of the cervical spine.    Multilevel degenerative changes of the cervical spine.    Electronically signed by: Ji Brar  Date:    10/31/2024  Time:    19:58  CT Head Without Contrast  Narrative: EXAMINATION:  CT HEAD WITHOUT CONTRAST    CLINICAL HISTORY:  Head trauma, minor (Age >= 65y);    TECHNIQUE:  Low dose axial CT images obtained throughout the head without intravenous contrast. Sagittal and coronal reconstructions were performed.    COMPARISON:  None available.    FINDINGS:  Ventricles and sulci are normal in size for age without evidence of hydrocephalus. No extra-axial blood or fluid collections.  Mild hypoattenuation in the supratentorial white matter, compatible with chronic microvascular ischemic changes.  No parenchymal mass, hemorrhage, edema or major vascular distribution infarct.    No calvarial fracture.  There is a small right posterior scalp laceration.  Bilateral paranasal sinuses and mastoid air cells are clear.  There are bilateral prosthetic lenses.  Impression: No acute intracranial abnormality.    Small right posterior scalp laceration.    Electronically signed by: Ji Brar  Date:    10/31/2024  Time:    19:55      Assessment/Plan:    1. SOB (shortness of breath)  Comments:  - Decreased breath sounds on the left lower lung  - Advised to report to ED for further eval and tx  - Referral to ED placed; Disha contacted  Orders:  -     Refer to Emergency  Dept.    2. Fall, subsequent encounter    3. Mid-back pain, acute    4. Paroxysmal atrial fibrillation       Assessment & Plan    Patient fell 1 week ago, with reported pain in mid-back and chest  Review of previous imaging showed old thoracic vertebra fracture  Concerned about potential shift in old fracture or new injury  Noted decreased lung sounds on affected side, suspicious for hemothorax or pneumothorax  History of atrial fibrillation and previous kidney fracture complicating clinical picture  Determined immediate ED evaluation necessary due to chest pain, breathing difficulties, and complex medical history    CHEST CONDITION:  - Chest exam performed in office.    EMERGENCY EVALUATION:  - Referred to [facility] for immediate evaluation and imaging.  - Will contact [facility] to inform them of patient's arrival.          Discussed how to stay healthy including: diet, exercise, refraining from smoking and discussed screening exams / tests needed for age, sex and family Hx.    This note was generated with the assistance of ambient listening technology. Verbal consent was obtained by the patient and accompanying visitor(s) for the recording of patient appointment to facilitate this note. I attest to having reviewed and edited the generated note for accuracy, though some syntax or spelling errors may persist. Please contact the author of this note for any clarification.       RTC following hospital visit    Kylie Cordero PA-C

## 2024-11-07 NOTE — PROGRESS NOTES
"  [facility] Plan of Care Note    Referral source: Primary Care Provider      Reason for consult: Shortness of Breath:"84 year old male with history of PAF, degenerative disc disease to T and L spine who presented to clinic with complaints of worsening mid back pain, chest pain, SOB since fall 1 week ago. He fell in [facility] facility while caring for his bed bound wife. CT head and neck stable. T spine CT shows fractures age indeterminate as well as remote T-spine and L-spine compression fractures. At the time of fall, he was advised to be admitted for obs, though requested to be discharged home. Here today, O2 90% on room air. Respiratory rate 19 counted out. On exam, decreased breath sounds left lower lung. No flail chest. Pain with inspiration. Pt refused offer to transport via EMS. He has niece with him today, who will bring to [place] ED via personal vehicle. "      Disposition recommended: Emergency Department      Additional Recommendations:  84-year-old male presenting with hypoxia, status post fall.  Needs to go to the ER for evaluation and further imaging, possible admission.    "

## 2024-11-07 NOTE — TELEPHONE ENCOUNTER
I have spoken to pt he confirmed appt for today at 1:20 with al. Pt state he will get his son to bring him to appt. I informed pt that if son cannot bring him to call back clinic.    Cortney Carson [facility] Staff  Caller: Unspecified (Today, 11:49 AM)  Type:  Patient Returning Call    Who Called: Pt  Who Left Message for Patient: Liz  Does the patient know what this is regarding?: returning missed call  Would the patient rather a call back or a response via [facility]? Call  Best Call Back Number: [phone]  Additional Information: pt states please use cell, he cannot reach home phone fast enough

## 2024-11-08 ENCOUNTER — SSC ENCOUNTER (OUTPATIENT)
Dept: ADMINISTRATIVE | Facility: OTHER | Age: 84
End: 2024-11-08
Payer: MEDICARE

## 2024-11-08 VITALS
BODY MASS INDEX: 29.55 KG/M2 | HEIGHT: 68 IN | TEMPERATURE: 98 F | DIASTOLIC BLOOD PRESSURE: 84 MMHG | SYSTOLIC BLOOD PRESSURE: 184 MMHG | OXYGEN SATURATION: 95 % | RESPIRATION RATE: 24 BRPM | WEIGHT: 195 LBS | HEART RATE: 81 BPM

## 2024-11-08 LAB
HAV IGM SERPL QL IA: NORMAL
HBV CORE IGM SERPL QL IA: NORMAL
HBV SURFACE AG SERPL QL IA: NORMAL
HCV AB SERPL QL IA: NORMAL

## 2024-11-08 PROCEDURE — 25000003 PHARM REV CODE 250: Performed by: EMERGENCY MEDICINE

## 2024-11-08 RX ORDER — METHOCARBAMOL 500 MG/1
1000 TABLET, FILM COATED ORAL 3 TIMES DAILY PRN
Qty: 30 TABLET | Refills: 0 | Status: SHIPPED | OUTPATIENT
Start: 2024-11-08 | End: 2024-11-15 | Stop reason: ALTCHOICE

## 2024-11-08 RX ORDER — LIDOCAINE 50 MG/G
1 PATCH TOPICAL DAILY
Qty: 30 PATCH | Refills: 0 | Status: SHIPPED | OUTPATIENT
Start: 2024-11-08 | End: 2024-11-15

## 2024-11-08 RX ORDER — LIDOCAINE 50 MG/G
1 PATCH TOPICAL
Status: DISCONTINUED | OUTPATIENT
Start: 2024-11-08 | End: 2024-11-08 | Stop reason: HOSPADM

## 2024-11-08 RX ORDER — IBUPROFEN 600 MG/1
600 TABLET ORAL EVERY 6 HOURS PRN
Qty: 20 TABLET | Refills: 0 | Status: SHIPPED | OUTPATIENT
Start: 2024-11-08 | End: 2024-11-15 | Stop reason: ALTCHOICE

## 2024-11-08 RX ADMIN — LIDOCAINE 1 PATCH: 50 PATCH TOPICAL at 12:11

## 2024-11-08 NOTE — ED NOTES
Pt performed a walking test without oxygen.  Pts sat remained 93% and his HR was 98. Pt tolerated it well.

## 2024-11-08 NOTE — DISCHARGE INSTRUCTIONS
Please take Tylenol, Motrin lidocaine patch as discussed.  Please use Robaxin for breakthrough pain.  Please use incentive spirometer as discussed.  Follow up with your primary care doctor and return the ER for any concerning reason.

## 2024-11-08 NOTE — ED PROVIDER NOTES
Encounter Date: 11/7/2024       History     Chief Complaint   Patient presents with    Weakness     Patient was seen in ED after falling in shower upstairs while visiting wife; pt explains that since going home he has had generalized weakness and increased pain to entirety of back; worse to middle of back and across chest into both shoulders ; patient states he in unable to get up and get around like normal ; pt saw PCP prior to visit today and was referred to ED for further evaluation     Patient is an 84-year-old male with a past medical history of hypertension who presents to emergency room for multiple complaints including abdominal pain, chest pain, decreased appetite, and generalized weakness since falling about a week ago.  Patient states that he was seen at the hospital and had staples placed.  No complications or drainage to the wound site since then.  However, he has been having worsening chest, abdominal, and back pain.  He was seen by primary care physician earlier today who advised to come to the emergency room for further evaluation.  Denies dysuria, hematuria, changes in bowel movements, shortness of breath, headache, visual changes, numbness, tingling, or others at this time.  He does endorse nausea with several episodes of vomiting a few days ago.  However, this resolved spontaneously.  No medications taken prior to arrival.    The history is provided by the patient. No  was used.     Review of patient's allergies indicates:   Allergen Reactions    Benadryl [diphenhydramine hcl] Hives    Clindamycin Hives    Trazodone      Diarrhea      Xarelto [rivaroxaban] Swelling     Knee swelling    Hydrocodone-acetaminophen Rash     Past Medical History:   Diagnosis Date    Hypertension      Past Surgical History:   Procedure Laterality Date    APPENDECTOMY      BACK SURGERY      CHOLECYSTECTOMY      COLONOSCOPY  01/01/2017    repeat Q 5 years    EYE SURGERY Bilateral     cataract    HERNIA  REPAIR      SHOULDER ARTHROSCOPY Bilateral     TOTAL KNEE ARTHROPLASTY Right 1960's     Family History   Problem Relation Name Age of Onset    Cancer Mother      Parkinsonism Father       Social History     Tobacco Use    Smoking status: Never     Passive exposure: Never    Smokeless tobacco: Never   Substance Use Topics    Alcohol use: No    Drug use: No     Review of Systems   Constitutional:  Negative for chills, diaphoresis, fatigue and fever.   HENT:  Negative for congestion, sore throat and trouble swallowing.    Respiratory:  Negative for cough and shortness of breath.    Cardiovascular:  Positive for chest pain. Negative for palpitations.   Gastrointestinal:  Positive for abdominal pain (right-sided). Negative for blood in stool, constipation, diarrhea, nausea and vomiting.   Genitourinary:  Negative for difficulty urinating, dysuria, frequency and hematuria.   Musculoskeletal:  Positive for back pain (mid back). Negative for myalgias.   Skin:  Negative for rash and wound.   Neurological:  Negative for weakness, light-headedness, numbness and headaches.       Physical Exam     Initial Vitals [11/07/24 1458]   BP Pulse Resp Temp SpO2   135/84 77 17 98.2 °F (36.8 °C) 96 %      MAP       --         Physical Exam    Nursing note and vitals reviewed.  Constitutional: He appears well-developed and well-nourished. He is not diaphoretic. No distress.   Patient well-appearing.  Awake and alert.  No acute distress.  Maintaining airway appropriately.  Speaking in complete sentences.   HENT:   Head: Normocephalic and atraumatic.   Right Ear: External ear normal.   Left Ear: External ear normal.   Eyes: Conjunctivae and EOM are normal.   Neck: Neck supple.   Normal range of motion.  Pulmonary/Chest: No respiratory distress. He exhibits tenderness.     Tenderness to palpation with some overlying bruising.   Abdominal: Abdomen is soft. He exhibits distension. There is abdominal tenderness (right upper quadrant). There is  no rebound and no guarding.   Musculoskeletal:         General: No edema. Normal range of motion.      Cervical back: Normal range of motion and neck supple.      Thoracic back: Tenderness present. Normal range of motion.        Back:       Comments: Diffuse tenderness to palpation as highlighted above.     Neurological: He is alert and oriented to person, place, and time. He has normal strength.   Skin: Skin is warm. Capillary refill takes less than 2 seconds. Bruising noted.        Psychiatric: He has a normal mood and affect. His behavior is normal. Thought content normal.         ED Course   Procedures  Labs Reviewed   CBC W/ AUTO DIFFERENTIAL - Abnormal       Result Value    WBC 7.91      RBC 4.09 (*)     Hemoglobin 12.0 (*)     Hematocrit 35.6 (*)     MCV 87      MCH 29.3      MCHC 33.7      RDW 13.1      Platelets 259      MPV 10.2      Immature Granulocytes 0.5      Gran # (ANC) 5.3      Immature Grans (Abs) 0.04      Lymph # 1.4      Mono # 0.8      Eos # 0.4      Baso # 0.08      nRBC 0      Gran % 66.5      Lymph % 17.2 (*)     Mono % 9.6      Eosinophil % 5.2      Basophil % 1.0      Differential Method Automated     COMPREHENSIVE METABOLIC PANEL - Abnormal    Sodium 140      Potassium 2.7 (*)     Chloride 102      CO2 24      Glucose 100      BUN 25 (*)     Creatinine 0.9      Calcium 9.5      Total Protein 6.9      Albumin 3.2 (*)     Total Bilirubin 1.1 (*)     Alkaline Phosphatase 83      AST 18      ALT 14      eGFR >60      Anion Gap 14      Narrative:      K  critical result(s) called and verbal readback obtained from   Noemi Vivas RN by KB10 11/07/2024 20:16   TROPONIN I - Abnormal    Troponin I 0.047 (*)    LIPASE    Lipase 6     MAGNESIUM    Magnesium 1.9     URINALYSIS, REFLEX TO URINE CULTURE   HEPATITIS PANEL, ACUTE   TROPONIN I   URINALYSIS MICROSCOPIC          Imaging Results              CTA Chest Abdomen Pelvis (Final result)  Result time 11/07/24 22:43:28   Procedure changed from CT  Chest Abdomen Pelvis With IV Contrast (XPD) Routine Oral Contrast     Final result by Ji Brar DO (11/07/24 22:43:28)                   Impression:      1. No evidence of an aortic aneurysm, dissection, or infection.  2. Increased conspicuity of the known T7 vertebral body acute or subacute fracture.  Numerous additional remote compression fractures of the thoracic and lumbar spine as above.  3. Indeterminate right adrenal nodule measuring 1.4 cm which can be further evaluated with nonemergent MRI.  4. Colonic diverticulosis.      Electronically signed by: Ji Brar  Date:    11/07/2024  Time:    22:43               Narrative:    EXAMINATION:  CTA CHEST ABDOMEN PELVIS    CLINICAL HISTORY:  Aortic infection or inflammation;    TECHNIQUE:  Low dose axial images were obtained from the thoracic inlet to the pubic symphysis before and following the administration of 150 mL of Omnipaque 350 intravenous contrast.  Sagittal and coronal reformats were provided.  Noncontrast, arterial phase, and 2 minute delayed phase were performed.    COMPARISON:  CT of the thoracic and lumbar spine from 10/31/2024.    FINDINGS:  Vasculature: There is appropriate contrast bolus timing.  There is no evidence of an aortic aneurysm or dissection.  There is no evidence of aortic wall thickening or para-aortic fat stranding or edema to suggest aortic inflammation or infection.  There is moderate atherosclerotic disease.  There is a normal, 3 vessel aortic arch.  The celiac artery, SMA, and RONNY are patent.  There are single renal arteries bilaterally which are patent.  The bilateral common, internal, and external iliac arteries demonstrate moderate atherosclerosis and are otherwise patent.  There is no evidence of a large central pulmonary embolism seen.    Lungs: Mild atelectasis in the bilateral posterior lower lobes.  Otherwise the lungs are clear.  No focal consolidation or ground-glass opacity.    Airways: The large airways are  clear.    Pleura: No fluid or thickening.  No pneumothorax.    Lymph nodes: No evidence of mediastinal, hilar, or axillary lymphadenopathy.    Esophagus: Normal.    Heart: Heart size is normal.  No pericardial effusion.  There are coronary artery calcifications.      Chest wall: Normal.    Liver: Normal.    Biliary tract: No intra or extrahepatic biliary ductal dilatation.    Gallbladder: The gallbladder is not seen, may be surgically absent.    Pancreas: Normal. No pancreatic ductal dilation.    Spleen: Normal in size without focal lesion.    Adrenals: There is a 1.4 cm indeterminate right adrenal nodule.    Kidneys and urinary collecting systems: There are simple exophytic cysts in the bilateral kidneys.  No hydronephrosis or urolithiasis.    Stomach and bowel: No bowel obstruction or abnormal bowel wall thickening.  There is a duodenal diverticulum.  There is colonic diverticulosis without evidence of acute diverticulitis.    Peritoneum and mesentery: No ascites or free intraperitoneal air.  No abdominal fluid collection.  No evidence of mesenteric or retroperitoneal lymphadenopathy.    Urinary bladder: Normal.    Reproductive organs: The prostate is normal.    Body wall: There is some scar tissue in the right inguinal soft tissues.  The body wall is otherwise unremarkable.    Musculoskeletal: Again seen is an acute or subacute T7 vertebral body fracture, increased in conspicuity on this study in comparison with prior.  There are remote compression fractures of the T6, T10, L3, and L4 vertebral bodies, with unchanged height loss from prior.  There is intramuscular lipoma within the left teres minor muscle.  Coleridge screws noted in the right proximal humerus.  There are degenerative changes.                                       Medications   morphine injection 4 mg (4 mg Intravenous Given 11/7/24 1944)   potassium chloride SA CR tablet 40 mEq (40 mEq Oral Given 11/7/24 2041)   iohexoL (OMNIPAQUE 350) injection 125  mL (150 mLs Intravenous Given 11/7/24 2113)   morphine injection 4 mg (4 mg Intravenous Given 11/7/24 2222)     Medical Decision Making  Patient presents to emergency room for several complaints.  Vital signs stable and within normal limits.  Physical exam as stated above.    Differential Diagnosis includes, but is not limited to AAA, aortic dissection, mesenteric ischemia, perforated viscous, MI/ACS, SBO/volvulus, incarcerated/strangulated hernia, intussusception, ileus, appendicitis, cholecystitis, cholangitis, diverticulitis, esophagitis, hepatitis, nephrolithiasis, pancreatitis, gastroenteritis, colitis, IBD/IBS, biliary colic, GERD, PUD, constipation, UTI/pyelonephritis, or  disorder.  Patient blood pressure stable and they are clinically well-appearing. EKG with AFib.  Normal rate.  LFTs within normal limits. EKG stable. Troponin slightly elevated. CTA without acute abnormality. Pending UA and repeat troponin.    Patient handed over to supervising physician at shift change. Likely discharge if troponin down trending. Etiology more likely due to contusion.     Problems Addressed:  Chest discomfort: acute illness or injury    Amount and/or Complexity of Data Reviewed  External Data Reviewed: notes.     Details: Patient is seen in the emergency room on 10/31/2024 after fall.  Had staples placed to his head at that time.  Imaging done of head, cervical, thoracic, lumbar spine without acute abnormality.  Labs: ordered. Decision-making details documented in ED Course.  Radiology: ordered. Decision-making details documented in ED Course.  ECG/medicine tests: ordered. Decision-making details documented in ED Course.    Risk  Prescription drug management.  Risk Details: Comorbidities taken into consideration during the patient's evaluation and treatment include hypertension.  Social determinants of health taken into consideration during development of our treatment plan include difficulty in obtaining follow-up,  obtaining medications, health literacy, access to healthy options for preventative/conservative management, and/or support systems due to, but not limited to, transportation limitations, socioeconomic status, and environmental factors.                ED Course as of 11/07/24 2258   Thu Nov 07, 2024 1922 EKG 12-lead  Independent interpretation of EKG atrial fibrillation at 85 beats per minute.  No ST elevations.  Left bundle-branch block noted.  Signed per Dr. Rodriguez. [BJ]   1951 CBC W/ AUTO DIFFERENTIAL(!)  CBC relatively unremarkable.  No leukocytosis.  Hemoglobin slightly decreased at 12.  Platelet count within normal limits. [BJ]   2016 Troponin I(!): 0.047  Elevated troponin. [BJ]   2022 Comp. Metabolic Panel(!!) [BJ]   2022 Troponin I(!): 0.047  Troponin mildly elevated. [BJ]   2022 Lipase: 6  Lipase within normal limits. [BJ]   2023 Magnesium : 1.9  Magnesium within normal limits. [BJ]   2023 Comp. Metabolic Panel(!!)  CMP with decrease in potassium to 2.7.  Bilirubin slightly elevated to 1.1.  Creatinine within normal limits.  LFTs within normal limits. [BJ]   2147 Potassium(!!): 2.7  Repleted orally. [BJ]   2250 CTA Chest Abdomen Pelvis  Impression:     1. No evidence of an aortic aneurysm, dissection, or infection.  2. Increased conspicuity of the known T7 vertebral body acute or subacute fracture.  Numerous additional remote compression fractures of the thoracic and lumbar spine as above.  3. Indeterminate right adrenal nodule measuring 1.4 cm which can be further evaluated with nonemergent MRI.  4. Colonic diverticulosis. [BJ]   2255 Patient care transferred to Dr. Barnett at shift change. I discussed the relevant history, physical exam, laboratory findings, radiological findings and anticipated disposition plan. On coming staff to formally complete visit disposition, review any pending laboratory/radiological results and to addend treatment plan as necessary. [BJ]      ED Course User Index  [BJ]  Carmen Garcia PA-C                           Clinical Impression:  Final diagnoses:  [R07.89] Chest discomfort  [R10.9] Abdominal discomfort (Primary)  [W19.XXXD] Fall, subsequent encounter                This note was partially created using [employer] Voice Recognition software. Typographical and content errors may occur with this process. While efforts are made to detect and correct such errors, in some cases errors will persist. For this reason, wording in this document should be considered in the proper context and not strictly verbatim.        Carmen Garcia PA-C  11/07/24 0497

## 2024-11-08 NOTE — ED NOTES
Pt fell 1 week ago while staying with his wife in the hospital.  He was seen in the ER and treated at [facility].  Pt complains of increasing pain all over especially in his upper back area and in his chest area.  Pt has healing bruising to his right upper back, mid chest area.  Pt also noted upon exam to have swelling in his right upper abdominal area and is jaundiced.  Stated he has a decreased appetite and nausea.  Pt placed on O2 at 2 LPM NPR due to sat on RA of 90-92.  Pt is alert and oriented x 3.  Placed on cardiac monitor, BP monitor and in a gown.  Family at bedside.

## 2024-11-10 LAB
OHS QRS DURATION: 142 MS
OHS QTC CALCULATION: 614 MS

## 2024-11-12 ENCOUNTER — OFFICE VISIT (OUTPATIENT)
Dept: FAMILY MEDICINE | Facility: CLINIC | Age: 84
End: 2024-11-12
Payer: MEDICARE

## 2024-11-12 ENCOUNTER — OUTPATIENT CASE MANAGEMENT (OUTPATIENT)
Dept: ADMINISTRATIVE | Facility: OTHER | Age: 84
End: 2024-11-12
Payer: MEDICARE

## 2024-11-12 ENCOUNTER — TELEPHONE (OUTPATIENT)
Dept: FAMILY MEDICINE | Facility: CLINIC | Age: 84
End: 2024-11-12
Payer: MEDICARE

## 2024-11-12 DIAGNOSIS — S22.000D COMPRESSION FRACTURE OF THORACIC VERTEBRA WITH ROUTINE HEALING, UNSPECIFIED THORACIC VERTEBRAL LEVEL, SUBSEQUENT ENCOUNTER: Primary | ICD-10-CM

## 2024-11-12 DIAGNOSIS — S32.000S COMPRESSION FRACTURE OF LUMBAR VERTEBRA, UNSPECIFIED LUMBAR VERTEBRAL LEVEL, SEQUELA: ICD-10-CM

## 2024-11-12 RX ORDER — LIDOCAINE 50 MG/G
1 PATCH TOPICAL DAILY
Qty: 30 PATCH | Refills: 0 | Status: SHIPPED | OUTPATIENT
Start: 2024-11-12

## 2024-11-12 RX ORDER — DULOXETIN HYDROCHLORIDE 30 MG/1
30 CAPSULE, DELAYED RELEASE ORAL DAILY
Qty: 30 CAPSULE | Refills: 11 | Status: SHIPPED | OUTPATIENT
Start: 2024-11-12 | End: 2024-11-12

## 2024-11-12 RX ORDER — TRAMADOL HYDROCHLORIDE 50 MG/1
50 TABLET ORAL EVERY 12 HOURS PRN
Qty: 60 TABLET | Refills: 0 | Status: SHIPPED | OUTPATIENT
Start: 2024-11-12

## 2024-11-12 RX ORDER — TRAMADOL HYDROCHLORIDE 50 MG/1
50 TABLET ORAL EVERY 12 HOURS PRN
Qty: 60 TABLET | Refills: 0 | Status: SHIPPED | OUTPATIENT
Start: 2024-11-12 | End: 2024-11-12

## 2024-11-12 RX ORDER — DULOXETIN HYDROCHLORIDE 30 MG/1
30 CAPSULE, DELAYED RELEASE ORAL DAILY
Qty: 30 CAPSULE | Refills: 11 | Status: SHIPPED | OUTPATIENT
Start: 2024-11-12

## 2024-11-12 NOTE — PROGRESS NOTES
OPCM RN spoke with Mr. Bruce, he was in a significant amount of pain during the call, unable complete full PHQ, message sent to Dr. Jarvis regarding concerns about pt's current mental status/depression/SI.

## 2024-11-12 NOTE — PROGRESS NOTES
"Outpatient Care Management   - Patient Assessment    Patient: Teo Gloria  MRN:  [id number]  Date of Service:  11/12/2024  Completed by:  Renata Pagan LCSW  Referral Date:     Reason for Visit   Patient presents with    Social Work Assessment     11/12/2024       Brief Summary:  received a referral from OPCM RN for the following HR SW psychosocial needs Reach out to the patient regarding: Danger to self or others  Notes: Sent message to PCP regarding recent thoughts of self harm/SI, unable to complete PHQ due to pt's significant amount of pain during enrollment call. Collaborated with OPC RN who states patient informed that he had SI thoughts yesterday night, but stated he was "about to hang it up"; patient reportedly stated to RN he had no plans for SI but does have access to a firearm. SW suggested for RN team to possibly call local [facility] dept for a welfare check due to patient's statements. Patient provided consent for RN to contact his son if needed. OPC RN leadership recommended a time sensitive referral to [facility] SW. SW contacted patient immediately after the referral was received. SW spoke with patient who adamantly denied any thoughts of wanting to hurt himself (or others) several times. SW inquired if patient has had psychiatry in the past, patient denies and declines any further information on mental health services. Patient stated, "I just told the wrong thing to the wrong person, that's all. I don't want to hurt myself. I'm not going to do anything." Patient confirms his wife is bed bound and has a sitter service during the week. Patient reports he takes care of her on the weekends. SW inquired if patient is experiencing caregiver burn out or feels as if he needs additional help in the home, patient denied both. Patient reports he made those statements to RN due to being in severe pain due to his recent fall. Patient states he needs his Ibuprofen refilled. RN did advise for " patient to purchase Ibuprofen over the counter, also notified son of patient's need for more. Patient states he relies on his son for transportation and various [employer] help if/when needed. Patient refuses further calls from PETE. Updated OPCM RN.  completed the SDOH questionnaire.

## 2024-11-12 NOTE — TELEPHONE ENCOUNTER
"----- Message from VAMSHI King sent at 11/12/2024  1:41 PM CST -----  Good afternoon,    I just spoke with Mr. Gloria and enrolled him into our OPCM program. He voiced that he is in severe pain currently around his right ribs, and states, "I am about ready to hang it up." He admitted to thoughts of self harm/suicidal ideations last night. When I asked if he had a plan, he expressed, "I don't really have a plan, but I would probably just use a gun." He denied thoughts during our phone call. I offered to call 911 as well as providing him with the Suicide/Crisis line, he declined both and said "I don't need all of that."    I am currently speaking with SW regarding Mr. Gloria.     In addition, he is inquiring about a home visit with you as soon as possible. He tells me that you do in home visits with his wife.     Thank you for your assistance.    Kind regards,     Christine Koch RN  [facility]  [phone]  "

## 2024-11-12 NOTE — LETTER
November 12, 2024             Dear Teo,    Welcome to [facility].  It was a pleasure talking with you today.  My name is Christine Koch, and I look forward to being your Care Manager.  My goal is to help you function at the healthiest and highest level possible.  You can contact me directly at [phone].    As an [facility] patient, some of the services we may be able to provide include:     Development of an individualized care plan with a Registered Nurse   Connection with a   Connection with available resources and services    Coordinate communication among your care team members   Provide coaching and education   Help you understand your doctors treatment plan  Help you obtain information about your insurance coverage.     All services provided by [facility] Man[facility] and other care team members are coordinated with and communicated to your primary care team.      As part of your enrollment, you will be receiving education materials and more information about these services in your [facility] account, by phone or through the mail.  If you do not wish to participate or receive information, please contact our office at [phone].      Sincerely,        Christine Koch, RN  [facility]   Out-patient RN Complex Care Manager

## 2024-11-12 NOTE — PROGRESS NOTES
"Outpatient Care Management  Initial Patient Assessment    Patient: Teo Gloria  MRN: [id number]  Date of Service: 11/12/2024  Completed by: Christine Koch RN  Referral Date:   Date of Eligibility: 11/8/2024  Program:   High Risk  Status: Ongoing  Effective Dates: 11/12/2024 - present  Responsible Staff: [facility] OUTPATIENT CASE MANAGEMENT [facility] HIGH RISK        Reason for Visit   Patient presents with    OPCM Enrollment Call       Brief Summary:  Teo Gloria was pulled from high risk report. Patient qualifies for program based on high risk score of 86.2%, frequent ER visits.   Active problem list, medical, surgical and social history reviewed. 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 Areas of need identified by patient include "getting pain under control"   Next steps:   Review pain rating.   Explore how Mr. Gloria is feeling mentally. Any thoughts of self harm/SI?  Mr. Gloria agrees to follow up on or around 11/15/2  Disability Status  Is the patient alert and oriented (person, place, time, and situation)?: Alert and oriented x 4  Hearing Difficulty or Deaf: no  Visual Difficulty or Blind: no  Visual and Hearing Needs Conclusion: No vision or hearing difficulties  Difficulty Concentrating, Remembering or Making Decisions: no  Communication Difficulty: no  Eating/Swallowing Difficulty: no  Walking or Climbing Stairs Difficulty: yes  Walking or Climbing Stairs: ambulation difficulty, requires equipment  Mobility Management: Patient states he does not use any ambulatory assistive devices at baseline, but is currently using a RW due to a recent fall.  Dressing/Bathing Difficulty: no  Toileting : Independent  Continence : Continence - Not a problem  Difficulty Managing Errands Independently: yes  Equipment Currently Used at Home: walker, rolling  ADL Conclusion Statement: Mr. Bruce lives with his spouse who he reports has been bedbound for the past 12 years " due to a stroke. He expressed that he has a paid caregiver Mon-Fri from 8-4, he is the primary caregiver in the evenings/nights and weekends. Mr. Bruce is currently using a rolling walker due to pain related to a recent fall on 10/31, no bathing or dressing difficulty, no toileting difficulties. Mr. Bruce reports that he has some assistance from his son, Jordan.  Change in Functional Status Since Onset of Current Illness/Injury: yes        Spiritual Beliefs  Spiritual, Cultural Beliefs, Baptist Practices, Values that Affect Care: no      Social History     Socioeconomic History    Marital status:    Tobacco Use    Smoking status: Never     Passive exposure: Never    Smokeless tobacco: Never   Substance and Sexual Activity    Alcohol use: No    Drug use: No    Sexual activity: Not Currently     Social Drivers of Health     Financial Resource Strain: Low Risk  (11/12/2024)    Overall Financial Resource Strain (CARDIA)     Difficulty of Paying Living Expenses: Not hard at all   Food Insecurity: No Food Insecurity (11/12/2024)    Hunger Vital Sign     Worried About Running Out of Food in the Last Year: Never true     Ran Out of Food in the Last Year: Never true   Transportation Needs: No Transportation Needs (11/12/2024)    TRANSPORTATION NEEDS     Transportation : No   Physical Activity: Insufficiently Active (11/12/2024)    Exercise Vital Sign     Days of Exercise per Week: 2 days     Minutes of Exercise per Session: 20 min   Stress: Stress Concern Present (11/12/2024)    [employer] [place] [employer] - Occupational Stress Questionnaire     Feeling of Stress : To some extent   Housing Stability: Low Risk  (11/12/2024)    Housing Stability Vital Sign     Unable to Pay for Housing in the Last Year: No     Homeless in the Last Year: No       Roles and Relationships  Primary Source of Support/Comfort: child(nelli)  Name of Support/Comfort Primary Source: Jordan  Secondary Source of Support/Comfort: no  one      Advance Directives (For Healthcare)  Advance Directive  (If Adv Dir status is received, view document under Adv Dir in header or Chart Review Media tab): Advance Directive currently in Epic.        Patient Reported Insurance  Verified current insurance plan:: Medicare            11/12/2024     1:35 PM 1/22/2024     3:48 PM 2/20/2023    11:21 AM 2/14/2022     3:59 PM 7/23/2021     9:07 AM 6/28/2021     1:14 PM 1/22/2021     3:02 PM   Depression Patient Health Questionnaire   Over the last two weeks how often have you been bothered by little interest or pleasure in doing things Several days Not at all Not at all Not at all Not at all Not at all Not at all   Over the last two weeks how often have you been bothered by feeling down, depressed or hopeless Several days Not at all Not at all Not at all Not at all Not at all Not at all   PHQ-2 Total Score 2 0 0 0 0 0 0       Learning Assessment       11/12/2024 2057 [facility] (11/12/2024 - Present)   Created by Christine Rodriguez, RN - RN (Nurse) Status: Complete                 PRIMARY LEARNER     Primary Learner Name:  Teo Gloria ED - 11/12/2024 2057    Relationship:  Patient ED - 11/12/2024 2057    Does the primary learner have any barriers to learning?:  No Barriers ED - 11/12/2024 2057    What is the preferred language of the primary learner?:  English ED - 11/12/2024 2057    Is an  required?:  No ED - 11/12/2024 2057    How does the primary learner prefer to learn new concepts?:  Reading, Listening ED - 11/12/2024 2057    How often do you need to have someone help you read instructions, pamphlets, or written material from your doctor or pharmacy?:  Never ED - 11/12/2024 2057        CO-LEARNER #1     No question answered        CO-LEARNER #2     No question answered        SPECIAL TOPICS     No question answered        ANSWERED BY:     No question answered        Comments         Edit History       Christine Rodriguez, RN - RN (Nurse)    11/12/2024 2057

## 2024-11-13 ENCOUNTER — TELEPHONE (OUTPATIENT)
Dept: FAMILY MEDICINE | Facility: CLINIC | Age: 84
End: 2024-11-13
Payer: MEDICARE

## 2024-11-13 NOTE — TELEPHONE ENCOUNTER
Soni with [facility] - Pt wife HH nurse called   Pt stated tramadol did not help last night    Appears to be in good spirits   Just in pain

## 2024-11-13 NOTE — PROGRESS NOTES
Patient ID: Teo Gloria is a 84 y.o. male.     Chief Complaint: pain after fall  HPI  History of Present Illness          Patient presents today for follow-up of back pain after a fall. He is accompanied by Son Black and daughter in law.     He reports back pain localized underneath the right rib and across the middle of his back, with occasional burning sensation in his chest, particularly when leaning forward. He denies pain wrapping around his torso. Multiple fractures have been identified in his back, including old fractures at T6, T10, and in the lumbar region. A new fracture at T7, believed to be the source of his current pain, likely occurred during a recent fall within the past two weeks.    He reports ineffective pain control with current regimen of Robaxin 500 mg twice daily and OTC ibuprofen. He was taking pain medication every six hours but still experienced inadequate relief. He recalls receiving morphine twice in the emergency room, which provided effective pain management without adverse effects on his blood pressure. He expresses a need for better pain control. A lidocaine patch was prescribed but unavailable at the pharmacy.    He reports experiencing suicidal thoughts and expresses uncertainty about whether he is depressed. His chronic pain has been significantly impacting his mental state. He has no current plan of suicide. He reports decreased appetite, lack of hunger, and inadequate fluid intake.     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 He expresses difficulty managing care, particularly on weekends. Currently, he has caregiver assistance for 8 hours a day during weekdays but lacks support on weekends and in the evenings. He acknowledges the need for additional help, especially on weekends, to manage the caregiving responsibilities more effectively.      Review of Systems  Review of Systems   Constitutional:  Negative for  fever.   HENT:  Negative for ear pain and sinus pain.    Eyes:  Negative for discharge.   Respiratory:  Negative for cough and shortness of breath.    Cardiovascular:  Negative for chest pain and leg swelling.   Gastrointestinal:  Negative for diarrhea, nausea and vomiting.   Genitourinary:  Negative for urgency.   Musculoskeletal:  Negative for myalgias.   Skin:  Negative for rash.   Neurological:  Negative for weakness and headaches.   Psychiatric/Behavioral:  Negative for depression.    All other systems reviewed and are negative.      Currently Medications  Current Outpatient Medications on File Prior to Visit   Medication Sig Dispense Refill    abiraterone (ZYTIGA) 250 mg Tab 250 mg 4 tablets in the morning.      amitriptyline (ELAVIL) 10 MG tablet TAKE 1-3 TABLETS BY MOUTH AT BEDTIME FOR SLEEP (Patient not taking: Reported on 11/12/2024) 270 tablet 1    aspirin (ECOTRIN) 81 MG EC tablet Take 81 mg by mouth once daily.      ibuprofen (ADVIL,MOTRIN) 600 MG tablet Take 1 tablet (600 mg total) by mouth every 6 (six) hours as needed for Pain. 20 tablet 0    ketoconazole (NIZORAL) 2 % cream Apply topically 2 (two) times daily. To affected area for rash (Patient not taking: Reported on 11/12/2024) 60 g 1    LIDOcaine (LIDODERM) 5 % Place 1 patch onto the skin once daily. Remove & Discard patch within 12 hours or as directed by MD 30 patch 0    lisinopriL 10 MG tablet Take 1 tablet (10 mg total) by mouth once daily. 90 tablet 3    methocarbamoL (ROBAXIN) 500 MG Tab Take 2 tablets (1,000 mg total) by mouth 3 (three) times daily as needed (Musculoskeletal pain). 30 tablet 0    nystatin-triamcinolone (MYCOLOG II) cream Apply topically 4 (four) times daily. (Patient not taking: Reported on 11/12/2024) 60 g 1    predniSONE (DELTASONE) 20 MG tablet One tablet daily by mouth for five days 5 tablet 0    [DISCONTINUED] HYDROmorphone (DILAUDID) 2 MG tablet TAKE 1-2 TABLETS BY MOUTH EVERY 6 HOURS AS NEEDED FOR PAIN (Patient not  taking: Reported on 11/12/2024) 60 tablet 0     No current facility-administered medications on file prior to visit.       Physical  Exam  There were no vitals filed for this visit.   There is no height or weight on file to calculate BMI.  Wt Readings from Last 3 Encounters:   11/07/24 88.5 kg (195 lb)   10/31/24 88.5 kg (195 lb)   08/01/24 85.7 kg (189 lb 0.7 oz)       Physical Exam  Vitals and nursing note reviewed.   Constitutional:       General: He is not in acute distress.     Appearance: He is obese. He is not ill-appearing.   HENT:      Head: Normocephalic and atraumatic.      Right Ear: External ear normal.      Left Ear: External ear normal.      Nose: Nose normal.      Mouth/Throat:      Mouth: Mucous membranes are moist.   Eyes:      Extraocular Movements: Extraocular movements intact.      Conjunctiva/sclera: Conjunctivae normal.   Cardiovascular:      Rate and Rhythm: Normal rate and regular rhythm.      Pulses: Normal pulses.      Heart sounds: No murmur heard.  Pulmonary:      Effort: Pulmonary effort is normal. No respiratory distress.      Breath sounds: No wheezing.   Abdominal:      General: There is no distension.      Palpations: Abdomen is soft. There is no mass.      Tenderness: There is no abdominal tenderness.   Musculoskeletal:         General: No swelling.      Cervical back: Normal range of motion.      Comments: Ambulates with assistance of family and rolling walker   Skin:     Coloration: Skin is not jaundiced.      Findings: No rash.   Neurological:      General: No focal deficit present.      Mental Status: He is alert and oriented to person, place, and time.   Psychiatric:         Mood and Affect: Mood is depressed.         Thought Content: Thought content normal.         Labs:    Complete Blood Count  Lab Results   Component Value Date    RBC 4.09 (L) 11/07/2024    HGB 12.0 (L) 11/07/2024    HCT 35.6 (L) 11/07/2024    MCV 87 11/07/2024    MCH 29.3 11/07/2024    MCHC 33.7 11/07/2024  "   RDW 13.1 11/07/2024     11/07/2024    MPV 10.2 11/07/2024    GRAN 5.3 11/07/2024    GRAN 66.5 11/07/2024    LYMPH 1.4 11/07/2024    LYMPH 17.2 (L) 11/07/2024    MONO 0.8 11/07/2024    MONO 9.6 11/07/2024    EOS 0.4 11/07/2024    BASO 0.08 11/07/2024    EOSINOPHIL 5.2 11/07/2024    BASOPHIL 1.0 11/07/2024    DIFFMETHOD Automated 11/07/2024       Comprehensive Metabolic Panel  Lab Results   Component Value Date     11/07/2024    BUN 25 (H) 11/07/2024    CREATININE 0.9 11/07/2024     11/07/2024    K 2.7 (LL) 11/07/2024     11/07/2024    PROT 6.9 11/07/2024    ALBUMIN 3.2 (L) 11/07/2024    BILITOT 1.1 (H) 11/07/2024    AST 18 11/07/2024    ALKPHOS 83 11/07/2024    CO2 24 11/07/2024    ALT 14 11/07/2024    ANIONGAP 14 11/07/2024       TSH  No results found for: "TSH"    Imaging:  CTA Chest Abdomen Pelvis  Narrative: EXAMINATION:  CTA CHEST ABDOMEN PELVIS    CLINICAL HISTORY:  Aortic infection or inflammation;    TECHNIQUE:  Low dose axial images were obtained from the thoracic inlet to the pubic symphysis before and following the administration of 150 mL of Omnipaque 350 intravenous contrast.  Sagittal and coronal reformats were provided.  Noncontrast, arterial phase, and 2 minute delayed phase were performed.    COMPARISON:  CT of the thoracic and lumbar spine from 10/31/2024.    FINDINGS:  Vasculature: There is appropriate contrast bolus timing.  There is no evidence of an aortic aneurysm or dissection.  There is no evidence of aortic wall thickening or para-aortic fat stranding or edema to suggest aortic inflammation or infection.  There is moderate atherosclerotic disease.  There is a normal, 3 vessel aortic arch.  The celiac artery, SMA, and RONNY are patent.  There are single renal arteries bilaterally which are patent.  The bilateral common, internal, and external iliac arteries demonstrate moderate atherosclerosis and are otherwise patent.  There is no evidence of a large central " pulmonary embolism seen.    Lungs: Mild atelectasis in the bilateral posterior lower lobes.  Otherwise the lungs are clear.  No focal consolidation or ground-glass opacity.    Airways: The large airways are clear.    Pleura: No fluid or thickening.  No pneumothorax.    Lymph nodes: No evidence of mediastinal, hilar, or axillary lymphadenopathy.    Esophagus: Normal.    Heart: Heart size is normal.  No pericardial effusion.  There are coronary artery calcifications.    Chest wall: Normal.    Liver: Normal.    Biliary tract: No intra or extrahepatic biliary ductal dilatation.    Gallbladder: The gallbladder is not seen, may be surgically absent.    Pancreas: Normal. No pancreatic ductal dilation.    Spleen: Normal in size without focal lesion.    Adrenals: There is a 1.4 cm indeterminate right adrenal nodule.    Kidneys and urinary collecting systems: There are simple exophytic cysts in the bilateral kidneys.  No hydronephrosis or urolithiasis.    Stomach and bowel: No bowel obstruction or abnormal bowel wall thickening.  There is a duodenal diverticulum.  There is colonic diverticulosis without evidence of acute diverticulitis.    Peritoneum and mesentery: No ascites or free intraperitoneal air.  No abdominal fluid collection.  No evidence of mesenteric or retroperitoneal lymphadenopathy.    Urinary bladder: Normal.    Reproductive organs: The prostate is normal.    Body wall: There is some scar tissue in the right inguinal soft tissues.  The body wall is otherwise unremarkable.    Musculoskeletal: Again seen is an acute or subacute T7 vertebral body fracture, increased in conspicuity on this study in comparison with prior.  There are remote compression fractures of the T6, T10, L3, and L4 vertebral bodies, with unchanged height loss from prior.  There is intramuscular lipoma within the left teres minor muscle.  Dayton screws noted in the right proximal humerus.  There are degenerative changes.  Impression: 1. No  evidence of an aortic aneurysm, dissection, or infection.  2. Increased conspicuity of the known T7 vertebral body acute or subacute fracture.  Numerous additional remote compression fractures of the thoracic and lumbar spine as above.  3. Indeterminate right adrenal nodule measuring 1.4 cm which can be further evaluated with nonemergent MRI.  4. Colonic diverticulosis.    Electronically signed by: Ji Brar  Date:    11/07/2024  Time:    22:43      Assessment/Plan:  Assessment & Plan      Patient has multiple back compression fractures, including new fracture at T7 likely from recent fall  Pain not adequately controlled with current regimen of ibuprofen and Robaxin  Switched to opioid pain management with tramadol, given patient's age and fractures  Added Cymbalta for depression and chronic pain management  Recommend lidocaine patches for topical pain relief  Considering referral to pain management for targeted nerve injections if conservative measures ineffective  I do not believe that patient is a threat to himself or anyone else       CHRONIC PAIN:  Educated on importance of taking pain medication on schedule to maintain pain control.  Explained rationale for Cymbalta in addressing both depression and chronic pain.  Started tramadol 50 mg twice daily (morning and evening).  Started Cymbalta in the morning for depression and pain management.  Started Tylenol 500 mg, take 3 tablets at midday.  Discontinued Robaxin.  Discontinued ibuprofen.  Start OTC 4% lidocaine patches while awaiting approval for prescription 5% patches.  Consider referral to orthopedic specialist for evaluation of back fractures and potential specialized brace.    BACK INJURY:  Discussed need for increased caregiver support to prevent further injury and promote healing.      CAREGIVER SUPPORT:  He will arrange for additional caregiver support, especially on weekends.    FOLLOW UP:  Follow up on Friday at 9:20 AM as scheduled.  Contact  office if pain is not well-controlled or if experiencing thoughts of self-harm.  Use patient portal messaging system for communication with office between visits.         1. Compression fracture of thoracic vertebra with routine healing, unspecified thoracic vertebral level, subsequent encounter  -     Discontinue: traMADoL (ULTRAM) 50 mg tablet; Take 1 tablet (50 mg total) by mouth every 12 (twelve) hours as needed for Pain.  Dispense: 60 tablet; Refill: 0  -     traMADoL (ULTRAM) 50 mg tablet; Take 1 tablet (50 mg total) by mouth every 12 (twelve) hours as needed for Pain.  Dispense: 60 tablet; Refill: 0  -     Ambulatory referral/consult to Neurosurgery; Future; Expected date: 11/19/2024    2. Compression fracture of lumbar vertebra, unspecified lumbar vertebral level, sequela  -     Ambulatory referral/consult to Neurosurgery; Future; Expected date: 11/19/2024    Other orders  -     Discontinue: DULoxetine (CYMBALTA) 30 MG capsule; Take 1 capsule (30 mg total) by mouth once daily.  Dispense: 30 capsule; Refill: 11  -     DULoxetine (CYMBALTA) 30 MG capsule; Take 1 capsule (30 mg total) by mouth once daily.  Dispense: 30 capsule; Refill: 11  -     LIDOcaine (LIDODERM) 5 %; Place 1 patch onto the skin once daily. Remove & Discard patch within 12 hours or as directed by MD  Dispense: 30 patch; Refill: 0         Discussed how to stay healthy including: diet, exercise, refraining from smoking and discussed screening exams / tests needed for age, sex and family Hx.    This includes 63 minutes of face to face time and non-face to face time preparing to see the patient (eg, review of tests), obtaining and/or reviewing separately obtained history, documenting clinical information in the electronic or other health record, independently interpreting results and communicating results to the patient/family/caregiver, or care coordinator.      Wu Rey MD    This note was generated with the assistance of ambient listening  technology. Verbal consent was obtained by the patient and accompanying visitor(s) for the recording of patient appointment to facilitate this note. I attest to having reviewed and edited the generated note for accuracy, though some syntax or spelling errors may persist. Please contact the author of this note for any clarification.

## 2024-11-13 NOTE — TELEPHONE ENCOUNTER
I called patient and spoke to him regarding his statement. He states that he is not suicidal but he is in significant pain. He ran out of the medication that was prescribed and cannot get in contact with the ER doctor to get a refill. I offered him an appointment to come in later today and he declined and said he has no way to get here. He requests to get off the phone because he does not have time to talk.    I called his niece rosy. She reports that patient has been struggling to take care of his wife. He has run out of pain medicines and is in pain from his back. He has a caregiver that comes to the home until 4pm to help him care for his wife. He cares for her on his own in the evenings, at night, and on the weekends.     I called Jordan, the patient's son. He did not answer. I left a voicemail.     I called the patient's daughter in law, Jordan's wife. She reports that her  is at her father in laws house now. She agrees to bring in Donna at 5 pm for a visit

## 2024-11-13 NOTE — TELEPHONE ENCOUNTER
Spoke to daughter in law. They would like to try tramadol another night before changing medications.

## 2024-11-15 ENCOUNTER — OFFICE VISIT (OUTPATIENT)
Dept: FAMILY MEDICINE | Facility: CLINIC | Age: 84
End: 2024-11-15
Payer: MEDICARE

## 2024-11-15 VITALS
HEART RATE: 67 BPM | TEMPERATURE: 98 F | WEIGHT: 186.81 LBS | BODY MASS INDEX: 28.31 KG/M2 | DIASTOLIC BLOOD PRESSURE: 84 MMHG | SYSTOLIC BLOOD PRESSURE: 132 MMHG | OXYGEN SATURATION: 94 % | HEIGHT: 68 IN

## 2024-11-15 DIAGNOSIS — F51.01 PRIMARY INSOMNIA: Primary | ICD-10-CM

## 2024-11-15 RX ORDER — TRAZODONE HYDROCHLORIDE 100 MG/1
100 TABLET ORAL NIGHTLY
Qty: 30 TABLET | Refills: 11 | Status: SHIPPED | OUTPATIENT
Start: 2024-11-15 | End: 2025-11-15

## 2024-11-15 RX ORDER — FUROSEMIDE 40 MG/1
40 TABLET ORAL DAILY
Qty: 30 TABLET | Refills: 2 | Status: SHIPPED | OUTPATIENT
Start: 2024-11-15

## 2024-11-15 NOTE — PROGRESS NOTES
Patient ID: Teo Gloria is a 84 y.o. male.     Chief Complaint: f/u pain    Patient here accompanied by son and daughter in law    Follow-up  Pertinent negatives include no chest pain, coughing, fever, headaches, myalgias, nausea, rash, vomiting or weakness.     History of Present Illness    Teo presents today for follow-up on multiple health issues.    He reports improvement in pain management with Tramadol, which is now effective unlike initially. He experiences back pain attributed to five broken bones in his back, including both old and new fractures. He notes muscle tightness in his back, particularly when attempting to get into bed. He uses pain patches on his back. He denies chest pain. He admits to inconsistent medication adherence, having skipped his morning dose of pain medication despite ongoing discomfort.    He reports difficulty sleeping, stating he stays up all night. He was previously prescribed Elavil for sleep and anxiety, but found it ineffective even at the maximum recommended dose of 2-3 pills. He has discontinued Elavil due to lack of efficacy.    He reports experiencing swelling. He was previously prescribed Furosemide as needed for edema management. However, when taking the medication daily, he experienced dehydration. He indicates the medication was effective in reducing fluid retention but led to dehydration as a side effect.    Review of Systems  Review of Systems   Constitutional:  Negative for fever.   HENT:  Negative for ear pain and sinus pain.    Eyes:  Negative for discharge.   Respiratory:  Negative for cough and shortness of breath.    Cardiovascular:  Negative for chest pain and leg swelling.   Gastrointestinal:  Negative for diarrhea, nausea and vomiting.   Genitourinary:  Negative for urgency.   Musculoskeletal:  Negative for myalgias.   Skin:  Negative for rash.   Neurological:  Negative for weakness and headaches.   Psychiatric/Behavioral:  Negative for depression.     All other systems reviewed and are negative.      Currently Medications  Current Outpatient Medications on File Prior to Visit   Medication Sig Dispense Refill    abiraterone (ZYTIGA) 250 mg Tab 250 mg 4 tablets in the morning.      aspirin (ECOTRIN) 81 MG EC tablet Take 81 mg by mouth once daily.      DULoxetine (CYMBALTA) 30 MG capsule Take 1 capsule (30 mg total) by mouth once daily. 30 capsule 11    LIDOcaine (LIDODERM) 5 % Place 1 patch onto the skin once daily. Remove & Discard patch within 12 hours or as directed by MD 30 patch 0    lisinopriL 10 MG tablet Take 1 tablet (10 mg total) by mouth once daily. 90 tablet 3    methocarbamoL (ROBAXIN) 500 MG Tab Take 2 tablets (1,000 mg total) by mouth 3 (three) times daily as needed (Musculoskeletal pain). 30 tablet 0    predniSONE (DELTASONE) 20 MG tablet One tablet daily by mouth for five days 5 tablet 0    traMADoL (ULTRAM) 50 mg tablet Take 1 tablet (50 mg total) by mouth every 12 (twelve) hours as needed for Pain. 60 tablet 0    amitriptyline (ELAVIL) 10 MG tablet TAKE 1-3 TABLETS BY MOUTH AT BEDTIME FOR SLEEP (Patient not taking: Reported on 11/15/2024) 270 tablet 1    ibuprofen (ADVIL,MOTRIN) 600 MG tablet Take 1 tablet (600 mg total) by mouth every 6 (six) hours as needed for Pain. 20 tablet 0    ketoconazole (NIZORAL) 2 % cream Apply topically 2 (two) times daily. To affected area for rash (Patient not taking: Reported on 11/12/2024) 60 g 1    LIDOcaine (LIDODERM) 5 % Place 1 patch onto the skin once daily. Remove & Discard patch within 12 hours or as directed by MD 30 patch 0    nystatin-triamcinolone (MYCOLOG II) cream Apply topically 4 (four) times daily. (Patient not taking: Reported on 11/12/2024) 60 g 1     No current facility-administered medications on file prior to visit.       Physical  Exam  Vitals:    11/15/24 0919   BP: 132/84   BP Location: Left arm   Patient Position: Sitting   Pulse: 67   Temp: 98.3 °F (36.8 °C)   SpO2: (!) 94%   Weight:  "84.8 kg (186 lb 13.4 oz)   Height: 5' 8" (1.727 m)      Body mass index is 28.41 kg/m².  Wt Readings from Last 3 Encounters:   11/15/24 84.8 kg (186 lb 13.4 oz)   11/07/24 88.5 kg (195 lb)   10/31/24 88.5 kg (195 lb)       Physical Exam  Vitals and nursing note reviewed.   Constitutional:       General: He is not in acute distress.     Appearance: He is not ill-appearing.   HENT:      Head: Normocephalic and atraumatic.      Right Ear: External ear normal.      Left Ear: External ear normal.      Nose: Nose normal.      Mouth/Throat:      Mouth: Mucous membranes are moist.   Eyes:      Extraocular Movements: Extraocular movements intact.      Conjunctiva/sclera: Conjunctivae normal.   Cardiovascular:      Rate and Rhythm: Normal rate and regular rhythm.      Pulses: Normal pulses.      Heart sounds: No murmur heard.  Pulmonary:      Effort: Pulmonary effort is normal. No respiratory distress.      Breath sounds: No wheezing.   Abdominal:      General: There is no distension.      Palpations: Abdomen is soft. There is no mass.      Tenderness: There is no abdominal tenderness.   Musculoskeletal:         General: No swelling.      Cervical back: Normal range of motion.      Right lower leg: Edema present.      Left lower leg: Edema present.   Skin:     Coloration: Skin is not jaundiced.      Findings: No rash.   Neurological:      General: No focal deficit present.      Mental Status: He is alert and oriented to person, place, and time.   Psychiatric:         Mood and Affect: Mood normal.         Thought Content: Thought content normal.         Labs:    Complete Blood Count  Lab Results   Component Value Date    RBC 4.09 (L) 11/07/2024    HGB 12.0 (L) 11/07/2024    HCT 35.6 (L) 11/07/2024    MCV 87 11/07/2024    MCH 29.3 11/07/2024    MCHC 33.7 11/07/2024    RDW 13.1 11/07/2024     11/07/2024    MPV 10.2 11/07/2024    GRAN 5.3 11/07/2024    GRAN 66.5 11/07/2024    LYMPH 1.4 11/07/2024    LYMPH 17.2 (L) " "11/07/2024    MONO 0.8 11/07/2024    MONO 9.6 11/07/2024    EOS 0.4 11/07/2024    BASO 0.08 11/07/2024    EOSINOPHIL 5.2 11/07/2024    BASOPHIL 1.0 11/07/2024    DIFFMETHOD Automated 11/07/2024       Comprehensive Metabolic Panel  Lab Results   Component Value Date     11/07/2024    BUN 25 (H) 11/07/2024    CREATININE 0.9 11/07/2024     11/07/2024    K 2.7 (LL) 11/07/2024     11/07/2024    PROT 6.9 11/07/2024    ALBUMIN 3.2 (L) 11/07/2024    BILITOT 1.1 (H) 11/07/2024    AST 18 11/07/2024    ALKPHOS 83 11/07/2024    CO2 24 11/07/2024    ALT 14 11/07/2024    ANIONGAP 14 11/07/2024       TSH  No results found for: "TSH"    Imaging:  CTA Chest Abdomen Pelvis  Narrative: EXAMINATION:  CTA CHEST ABDOMEN PELVIS    CLINICAL HISTORY:  Aortic infection or inflammation;    TECHNIQUE:  Low dose axial images were obtained from the thoracic inlet to the pubic symphysis before and following the administration of 150 mL of Omnipaque 350 intravenous contrast.  Sagittal and coronal reformats were provided.  Noncontrast, arterial phase, and 2 minute delayed phase were performed.    COMPARISON:  CT of the thoracic and lumbar spine from 10/31/2024.    FINDINGS:  Vasculature: There is appropriate contrast bolus timing.  There is no evidence of an aortic aneurysm or dissection.  There is no evidence of aortic wall thickening or para-aortic fat stranding or edema to suggest aortic inflammation or infection.  There is moderate atherosclerotic disease.  There is a normal, 3 vessel aortic arch.  The celiac artery, SMA, and RONNY are patent.  There are single renal arteries bilaterally which are patent.  The bilateral common, internal, and external iliac arteries demonstrate moderate atherosclerosis and are otherwise patent.  There is no evidence of a large central pulmonary embolism seen.    Lungs: Mild atelectasis in the bilateral posterior lower lobes.  Otherwise the lungs are clear.  No focal consolidation or ground-glass " opacity.    Airways: The large airways are clear.    Pleura: No fluid or thickening.  No pneumothorax.    Lymph nodes: No evidence of mediastinal, hilar, or axillary lymphadenopathy.    Esophagus: Normal.    Heart: Heart size is normal.  No pericardial effusion.  There are coronary artery calcifications.    Chest wall: Normal.    Liver: Normal.    Biliary tract: No intra or extrahepatic biliary ductal dilatation.    Gallbladder: The gallbladder is not seen, may be surgically absent.    Pancreas: Normal. No pancreatic ductal dilation.    Spleen: Normal in size without focal lesion.    Adrenals: There is a 1.4 cm indeterminate right adrenal nodule.    Kidneys and urinary collecting systems: There are simple exophytic cysts in the bilateral kidneys.  No hydronephrosis or urolithiasis.    Stomach and bowel: No bowel obstruction or abnormal bowel wall thickening.  There is a duodenal diverticulum.  There is colonic diverticulosis without evidence of acute diverticulitis.    Peritoneum and mesentery: No ascites or free intraperitoneal air.  No abdominal fluid collection.  No evidence of mesenteric or retroperitoneal lymphadenopathy.    Urinary bladder: Normal.    Reproductive organs: The prostate is normal.    Body wall: There is some scar tissue in the right inguinal soft tissues.  The body wall is otherwise unremarkable.    Musculoskeletal: Again seen is an acute or subacute T7 vertebral body fracture, increased in conspicuity on this study in comparison with prior.  There are remote compression fractures of the T6, T10, L3, and L4 vertebral bodies, with unchanged height loss from prior.  There is intramuscular lipoma within the left teres minor muscle.  [place] screws noted in the right proximal humerus.  There are degenerative changes.  Impression: 1. No evidence of an aortic aneurysm, dissection, or infection.  2. Increased conspicuity of the known T7 vertebral body acute or subacute fracture.  Numerous additional  remote compression fractures of the thoracic and lumbar spine as above.  3. Indeterminate right adrenal nodule measuring 1.4 cm which can be further evaluated with nonemergent MRI.  4. Colonic diverticulosis.    Electronically signed by: Ji Brar  Date:    11/07/2024  Time:    22:43      Assessment/Plan:  Assessment & Plan    Continued tramadol for pain management, noting effectiveness after initial adjustment period  Discontinued Elavil due to ineffectiveness and potential interactions  Started trazodone as a safer alternative for sleep, with potential benefits for depression  Added melatonin to improve sleep regimen  Resumed daily Lasix for fluid management  Considering adjusting Lasix dosage based on patient's urinary response  Maintained Cymbalta, noting improvement in appetite and pain reduction    INSOMNIA:  Explained melatonin as a naturally occurring hormone that aids in sleep.  Started trazodone 100 mg at bedtime for sleep  Started OTC melatonin 5 mg at bedtime with trazodone, can increase up to 20 mg.  Contact the office if trazodone is ineffective after a few nights to discuss dose adjustment.    LOCALIZED EDEMA:  Discussed Lasix's mechanism of action for fluid removal through urination.  Restarted Lasix daily for 2 weeks.    PAIN MANAGEMENT:  Continued tramadol for pain management.  Continued Tylenol as needed.    MAJOR DEPRESSIVE DISORDER:  Continued Cymbalta at current dose.  Discontinued Elavil.    FOLLOW-UP:  Follow up in 2 weeks to reassess Lasix effectiveness.         1. Primary insomnia  -     traZODone (DESYREL) 100 MG tablet; Take 1 tablet (100 mg total) by mouth every evening.  Dispense: 30 tablet; Refill: 11    Other orders  -     furosemide (LASIX) 40 MG tablet; Take 1 tablet (40 mg total) by mouth once daily.  Dispense: 30 tablet; Refill: 2         Discussed how to stay healthy including: diet, exercise, refraining from smoking and discussed screening exams / tests needed for age, sex  and family Hx.    RTC 2 weeks    uW Rey MD    This note was generated with the assistance of ambient listening technology. Verbal consent was obtained by the patient and accompanying visitor(s) for the recording of patient appointment to facilitate this note. I attest to having reviewed and edited the generated note for accuracy, though some syntax or spelling errors may persist. Please contact the author of this note for any clarification.

## 2024-11-18 ENCOUNTER — PATIENT MESSAGE (OUTPATIENT)
Dept: FAMILY MEDICINE | Facility: CLINIC | Age: 84
End: 2024-11-18
Payer: MEDICARE

## 2024-11-19 ENCOUNTER — OUTPATIENT CASE MANAGEMENT (OUTPATIENT)
Dept: ADMINISTRATIVE | Facility: OTHER | Age: 84
End: 2024-11-19
Payer: MEDICARE

## 2024-11-19 ENCOUNTER — TELEPHONE (OUTPATIENT)
Dept: FAMILY MEDICINE | Facility: CLINIC | Age: 84
End: 2024-11-19
Payer: MEDICARE

## 2024-11-19 RX ORDER — DULOXETIN HYDROCHLORIDE 60 MG/1
60 CAPSULE, DELAYED RELEASE ORAL DAILY
Qty: 90 CAPSULE | Refills: 1 | Status: SHIPPED | OUTPATIENT
Start: 2024-11-19

## 2024-11-19 NOTE — PROGRESS NOTES
Outpatient Care Management  Plan of Care Follow Up Visit    Patient: Teo Gloria  MRN: [id number]  Date of Service: 11/19/2024  Completed by: Christine Koch RN  Referral Date:     Reason for Visit   Patient presents with    OPCM RN Follow Up Call    Nursing Assessment       Brief Summary: OPCM RN followed up with Mr. Gloria today for care plan review.    Next Steps:   Explore appt with neurosurgery.  Review pain rating.  Explore how Mr. Gloria is feeling mentally. Cymbalta helping?  Mr. Gloria agrees to follow up on or around 12/3/24.

## 2024-11-19 NOTE — TELEPHONE ENCOUNTER
----- Message from Lesly sent at 11/19/2024  1:38 PM CST -----  Type:  Needs Medical Advice    Who Called: Jordan /   Symptoms (please be specific):   How long has patient had these symptoms:    Pharmacy name and phone #:  [id number] - [place] [facility] [address] AT [facility] [address]   Phone: [phone]  Fax: [phone]  Would the patient rather a call back or a response via [facility]? Call back   Best Call Back Number: [phone]   Additional Information: calling because they think that the patient medication may need to be adjusted.. pt seems to be depressed... pt states he is tired. Sluggish, and not eating

## 2024-11-19 NOTE — TELEPHONE ENCOUNTER
Increase cymbalta to 60mg. He can take 2 of the ones he has at home. And then I will send in a prescription for a 60mg pill

## 2024-11-21 ENCOUNTER — OFFICE VISIT (OUTPATIENT)
Dept: NEUROSURGERY | Facility: CLINIC | Age: 84
End: 2024-11-21
Payer: MEDICARE

## 2024-11-21 VITALS — DIASTOLIC BLOOD PRESSURE: 59 MMHG | HEART RATE: 71 BPM | SYSTOLIC BLOOD PRESSURE: 109 MMHG

## 2024-11-21 DIAGNOSIS — S22.069D CLOSED FRACTURE OF SEVENTH THORACIC VERTEBRA WITH ROUTINE HEALING, UNSPECIFIED FRACTURE MORPHOLOGY, SUBSEQUENT ENCOUNTER: Primary | ICD-10-CM

## 2024-11-21 DIAGNOSIS — S32.000S COMPRESSION FRACTURE OF LUMBAR VERTEBRA, UNSPECIFIED LUMBAR VERTEBRAL LEVEL, SEQUELA: ICD-10-CM

## 2024-11-21 DIAGNOSIS — S22.000D COMPRESSION FRACTURE OF THORACIC VERTEBRA WITH ROUTINE HEALING, UNSPECIFIED THORACIC VERTEBRAL LEVEL, SUBSEQUENT ENCOUNTER: ICD-10-CM

## 2024-11-21 PROCEDURE — 99214 OFFICE O/P EST MOD 30 MIN: CPT | Mod: S$PBB,,, | Performed by: PHYSICIAN ASSISTANT

## 2024-11-21 PROCEDURE — 99213 OFFICE O/P EST LOW 20 MIN: CPT | Mod: PBBFAC,PN | Performed by: PHYSICIAN ASSISTANT

## 2024-11-21 PROCEDURE — 99999 PR PBB SHADOW E&M-EST. PATIENT-LVL III: CPT | Mod: PBBFAC,,, | Performed by: PHYSICIAN ASSISTANT

## 2024-11-21 RX ORDER — CELECOXIB 200 MG/1
200 CAPSULE ORAL 2 TIMES DAILY PRN
Qty: 60 CAPSULE | Refills: 1 | Status: SHIPPED | OUTPATIENT
Start: 2024-11-21

## 2024-11-21 RX ORDER — TRAMADOL HYDROCHLORIDE 100 MG/1
100 TABLET, COATED ORAL 2 TIMES DAILY PRN
Qty: 60 TABLET | Refills: 1 | Status: SHIPPED | OUTPATIENT
Start: 2024-11-21

## 2024-11-22 NOTE — PROGRESS NOTES
Subjective:     Patient ID:  Teo Gloria is a 84 y.o. male.    Luis    Chief Complaint: Midback pain    HPI    Teo Gloria is a 84 y.o. male who presents for follow up.  Patient fell backwards and hit his head on October 31, 2024.  He was seen in the emergency room and diagnosed with a new T7 fracture.  He was not placed in a back brace.  He continued to have the pain and a CT chest abdomen pelvis showed worsening of the fracture at T7 about a week later.  He has old fractures at T6 and T10.  He states the midback pain is the same for the last 3 weeks.  It is in the midline in the midthoracic spine region.  Does not radiate around his chest wall.  No leg pain or paresthesias.  He has been taking tramadol 50 mg twice a day Tylenol and Cymbalta.  This is helping slightly.    Patient denies any recent accidents or trauma, no saddle anesthesias, and no bowel or bladder incontinence.      Review of Systems:  Constitution: Negative for chills, fever, night sweats and weight loss.   Musculoskeletal: Negative for falls.   Gastrointestinal: Negative for bowel incontinence, nausea and vomiting.   Genitourinary: Negative for bladder incontinence.   Neurological: Negative for disturbances in coordination and loss of balance.      Objective:      Vitals:    11/21/24 1118   BP: (!) 109/59   Pulse: 71   PainSc: 10-Worst pain ever   PainLoc: Back         Physical Exam: exam done in the chair      General:  Teo Gloria is well-developed, well-nourished, appears stated age, in no acute distress, alert and oriented to person, place, and time.    Pulmonary/Chest:  Respiratory effort normal  Abdominal: Exhibits no distension  Psychiatric:  Normal mood and affect.  Behavior is normal.  Judgement and thought content normal      Musculoskeletal:    Lumbar Spine Palpation:  Mild tenderness to low back palpation.  Moderate tenderness to mid thoracic spine palpation.      Neurological:  Alert and oriented to person, place, and  time    Muscle strength against resistance:     Right Left   Hip flexion  5 / 5 5 / 5   Hip extension 5 / 5 5 / 5   Hip abduction 5 / 5 5 / 5   Hip adduction  5 / 5 5 / 5   Knee extension  5 / 5 5 / 5   Knee flexion 5 / 5 5 / 5   Dorsiflexion  5 / 5 5 / 5   EHL  5 / 5 5 / 5   Plantar flexion  5 / 5 5 / 5   Inversion of the feet 5 / 5 5 / 5   Eversion of the feet  5 / 5 5 / 5       Reflexes:     Right Left   Patellar 2+ 2+   Achilles 2+ 2+     Clonus:  Negative bilaterally    On gross examination of the bilateral upper extremities, patient has full painfree ROM with no signs of clubbing, cyanosis, edema, or weakness.       CT thoracic and CTA Chest abdomen Interpretation:     Old T6 and t10 fracture.  Probable new fracture T7.  No cord compression or bony retropulsion.      Assessment:          1. Closed fracture of seventh thoracic vertebra with routine healing, unspecified fracture morphology, subsequent encounter    2. Compression fracture of thoracic vertebra with routine healing, unspecified thoracic vertebral level, subsequent encounter    3. Compression fracture of lumbar vertebra, unspecified lumbar vertebral level, sequela            Plan:          Orders Placed This Encounter    MRI Thoracic Spine Without Contrast    celecoxib (CELEBREX) 200 MG capsule    traMADoL 100 mg Tab       Will get MRI thoracic spine to assess acuity of T7 fracture  TLSO brace  Increase tramadol to 100mg BID PRN  Add celebrex 200mg BID PRN  Will send results of MRI tspine through the portal    Follow-Up:  Follow up if symptoms worsen or fail to improve. If there are any questions prior to this, the patient was instructed to contact the office.       Lesly Qiu, [facility], PA-C  Neurosurgery  [facility]  11/22/2024

## 2024-11-30 ENCOUNTER — EXTERNAL CHRONIC CARE MANAGEMENT (OUTPATIENT)
Dept: PRIMARY CARE CLINIC | Facility: CLINIC | Age: 84
End: 2024-11-30
Payer: MEDICARE

## 2024-11-30 PROCEDURE — 99487 CPLX CHRNC CARE 1ST 60 MIN: CPT | Mod: S$GLB,,, | Performed by: FAMILY MEDICINE

## 2024-11-30 PROCEDURE — 99489 CPLX CHRNC CARE EA ADDL 30: CPT | Mod: S$GLB,,, | Performed by: FAMILY MEDICINE

## 2024-12-03 ENCOUNTER — OUTPATIENT CASE MANAGEMENT (OUTPATIENT)
Dept: ADMINISTRATIVE | Facility: OTHER | Age: 84
End: 2024-12-03
Payer: MEDICARE

## 2024-12-03 ENCOUNTER — PATIENT MESSAGE (OUTPATIENT)
Dept: FAMILY MEDICINE | Facility: CLINIC | Age: 84
End: 2024-12-03
Payer: MEDICARE

## 2024-12-03 NOTE — PROGRESS NOTES
12/3/2024  1st attempt to complete Follow-Up for Outpatient Care Management, left message.  Will send portal message with unable to assess letter.

## 2024-12-03 NOTE — LETTER
Teo Gloria  [address] [place] [id number]      Dear Teo Gloria,     I am your nurse with [facility]s Outpatient Care Management Department. I was unsuccessful in reaching you today. At your earliest convenience, I would like to discuss your healthcare progress.      Please contact me at [phone] from 8:00AM to 4:30 PM on Monday thru Friday.     As you know, [facility] is a program offered to you through [facility] where a nurse is available 24/7 to answer questions or provide medical advice, their number is [phone].    Thanks,      Christine Koch, RN  Outpatient Care Management

## 2024-12-04 ENCOUNTER — OUTPATIENT CASE MANAGEMENT (OUTPATIENT)
Dept: ADMINISTRATIVE | Facility: OTHER | Age: 84
End: 2024-12-04
Payer: MEDICARE

## 2024-12-04 RX ORDER — FUROSEMIDE 40 MG/1
TABLET ORAL
Qty: 60 TABLET | Refills: 2 | Status: SHIPPED | OUTPATIENT
Start: 2024-12-04

## 2024-12-05 ENCOUNTER — HOSPITAL ENCOUNTER (OUTPATIENT)
Dept: RADIOLOGY | Facility: HOSPITAL | Age: 84
Discharge: HOME | End: 2024-12-05
Attending: PHYSICIAN ASSISTANT
Payer: MEDICARE

## 2024-12-05 ENCOUNTER — PATIENT MESSAGE (OUTPATIENT)
Dept: NEUROSURGERY | Facility: CLINIC | Age: 84
End: 2024-12-05
Payer: MEDICARE

## 2024-12-05 DIAGNOSIS — S22.069D CLOSED FRACTURE OF SEVENTH THORACIC VERTEBRA WITH ROUTINE HEALING, UNSPECIFIED FRACTURE MORPHOLOGY, SUBSEQUENT ENCOUNTER: ICD-10-CM

## 2024-12-05 PROCEDURE — 72146 MRI CHEST SPINE W/O DYE: CPT | Mod: 26,,, | Performed by: RADIOLOGY

## 2024-12-05 PROCEDURE — 72146 MRI CHEST SPINE W/O DYE: CPT | Mod: TC,PN

## 2024-12-06 NOTE — TELEPHONE ENCOUNTER
Schedule CT thoracic spine and fu with Dr. Smith per Dr. Smith.    Lesly Qiu, [facility], PA-C  Neurosurgery  [facility]  12/06/2024

## 2024-12-11 ENCOUNTER — OUTPATIENT CASE MANAGEMENT (OUTPATIENT)
Dept: ADMINISTRATIVE | Facility: OTHER | Age: 84
End: 2024-12-11
Payer: MEDICARE

## 2024-12-11 NOTE — PROGRESS NOTES
12/11/2024  3rd attempt to complete Follow-Up for Outpatient Care Management, unable to leave message. After multiple unsuccessful attempts, will proceed with case closure.

## 2024-12-18 ENCOUNTER — HOSPITAL ENCOUNTER (OUTPATIENT)
Dept: RADIOLOGY | Facility: HOSPITAL | Age: 84
Discharge: HOME | End: 2024-12-18
Attending: PHYSICIAN ASSISTANT
Payer: MEDICARE

## 2024-12-18 DIAGNOSIS — S22.060G COMPRESSION FRACTURE OF T7 VERTEBRA WITH DELAYED HEALING, SUBSEQUENT ENCOUNTER: ICD-10-CM

## 2024-12-18 PROCEDURE — 72128 CT CHEST SPINE W/O DYE: CPT | Mod: 26,,, | Performed by: STUDENT IN AN ORGANIZED HEALTH CARE EDUCATION/TRAINING PROGRAM

## 2024-12-18 PROCEDURE — 72128 CT CHEST SPINE W/O DYE: CPT | Mod: TC,PN

## 2024-12-31 ENCOUNTER — EXTERNAL CHRONIC CARE MANAGEMENT (OUTPATIENT)
Dept: PRIMARY CARE CLINIC | Facility: CLINIC | Age: 84
End: 2024-12-31
Payer: MEDICARE

## 2024-12-31 PROCEDURE — 99490 CHRNC CARE MGMT STAFF 1ST 20: CPT | Mod: S$GLB,,, | Performed by: FAMILY MEDICINE

## 2025-01-15 RX ORDER — CELECOXIB 200 MG/1
200 CAPSULE ORAL 2 TIMES DAILY PRN
Qty: 60 CAPSULE | Refills: 1 | Status: SHIPPED | OUTPATIENT
Start: 2025-01-15

## 2025-01-30 ENCOUNTER — PATIENT MESSAGE (OUTPATIENT)
Dept: FAMILY MEDICINE | Facility: CLINIC | Age: 85
End: 2025-01-30
Payer: MEDICARE

## 2025-01-30 ENCOUNTER — OFFICE VISIT (OUTPATIENT)
Dept: FAMILY MEDICINE | Facility: CLINIC | Age: 85
End: 2025-01-30
Payer: MEDICARE

## 2025-01-30 DIAGNOSIS — I95.9 HYPOTENSION, UNSPECIFIED HYPOTENSION TYPE: Primary | ICD-10-CM

## 2025-01-30 DIAGNOSIS — D69.2 OTHER NONTHROMBOCYTOPENIC PURPURA: ICD-10-CM

## 2025-01-30 DIAGNOSIS — R63.0 LOSS OF APPETITE: ICD-10-CM

## 2025-01-30 DIAGNOSIS — C61 PROSTATE CANCER: ICD-10-CM

## 2025-01-30 DIAGNOSIS — F32.A DEPRESSION, UNSPECIFIED DEPRESSION TYPE: ICD-10-CM

## 2025-01-30 DIAGNOSIS — I70.0 AORTIC ATHEROSCLEROSIS: ICD-10-CM

## 2025-01-30 DIAGNOSIS — I48.0 PAROXYSMAL ATRIAL FIBRILLATION: ICD-10-CM

## 2025-01-30 DIAGNOSIS — F43.21 GRIEF: ICD-10-CM

## 2025-01-30 DIAGNOSIS — R63.4 WEIGHT LOSS: ICD-10-CM

## 2025-01-30 PROCEDURE — 99350 HOME/RES VST EST HIGH MDM 60: CPT | Mod: S$GLB,,, | Performed by: STUDENT IN AN ORGANIZED HEALTH CARE EDUCATION/TRAINING PROGRAM

## 2025-01-30 RX ORDER — MIRTAZAPINE 15 MG/1
15 TABLET, FILM COATED ORAL NIGHTLY
Qty: 30 TABLET | Refills: 11 | Status: SHIPPED | OUTPATIENT
Start: 2025-01-30

## 2025-01-30 NOTE — Clinical Note
Please schedule for 4 week follow up house call on a thrusday. Let son or daughter in law know when it is scheduled

## 2025-01-31 ENCOUNTER — EXTERNAL CHRONIC CARE MANAGEMENT (OUTPATIENT)
Dept: PRIMARY CARE CLINIC | Facility: CLINIC | Age: 85
End: 2025-01-31
Payer: MEDICARE

## 2025-01-31 PROCEDURE — 99490 CHRNC CARE MGMT STAFF 1ST 20: CPT | Mod: S$GLB,,, | Performed by: FAMILY MEDICINE

## 2025-01-31 NOTE — PROGRESS NOTES
This patient is being seen at home due to physical debility that presents a taxing effort to leave the home, to mitigate high risk of hospital readmission and/or due to the limited availability of reliable or safe options for transportation to the point of access to the provider. Prior to treatment on this visit the chart was reviewed and patient verbal consent was obtained.   Patient ID: Teo Gloria is a 84 y.o. male.     Chief Complaint: depression, decreased appetite    HPI  Patient presents accompanied by son and daugther in law. His wife passed away 2 months ago. Over the lat 2 weeks he has become progressively more depressed. He has decreased appetite and is eating mostly junk food. Son has many home cooked frozen meals in freezer, but patient does not want to eat them. He has a sitter than comes in to help care for him on weekdays. He also reports new onset constipation. He is taking miralax intermittently.     He reports some dizziness with standing.     Blood pressure in the oncology appt this morning was 90s/70s.     Patient reports that he drinks 4 bottles of water per day. Daughter in law reports that he drinks less than 1 bottle of water per day.     Review of Systems  Review of Systems   Constitutional:  Negative for fever.   HENT:  Negative for ear pain and sinus pain.    Eyes:  Negative for discharge.   Respiratory:  Negative for cough and shortness of breath.    Cardiovascular:  Negative for chest pain and leg swelling.   Gastrointestinal:  Negative for diarrhea, nausea and vomiting.   Genitourinary:  Negative for urgency.   Musculoskeletal:  Negative for myalgias.   Skin:  Negative for rash.   Neurological:  Negative for weakness and headaches.   Psychiatric/Behavioral:  Negative for depression.    All other systems reviewed and are negative.      Currently Medications  Current Outpatient Medications on File Prior to Visit   Medication Sig Dispense Refill    abiraterone (ZYTIGA) 250 mg Tab 250  mg 4 tablets in the morning.      aspirin (ECOTRIN) 81 MG EC tablet Take 81 mg by mouth once daily.      celecoxib (CELEBREX) 200 MG capsule Take 1 capsule (200 mg total) by mouth 2 (two) times daily as needed for Pain. 60 capsule 1    DULoxetine (CYMBALTA) 60 MG capsule Take 1 capsule (60 mg total) by mouth once daily. 90 capsule 1    furosemide (LASIX) 40 MG tablet One tablet by mouth up to 2 times a day.  For edema. 60 tablet 2    LIDOcaine (LIDODERM) 5 % Place 1 patch onto the skin once daily. Remove & Discard patch within 12 hours or as directed by MD 30 patch 0    nystatin-triamcinolone (MYCOLOG II) cream Apply topically 4 (four) times daily. (Patient not taking: Reported on 11/12/2024) 60 g 1    predniSONE (DELTASONE) 20 MG tablet One tablet daily by mouth for five days 5 tablet 0    traMADoL 100 mg Tab Take 100 mg by mouth 2 (two) times daily as needed (pain). 60 tablet 1    traZODone (DESYREL) 100 MG tablet Take 1 tablet (100 mg total) by mouth every evening. 30 tablet 11     No current facility-administered medications on file prior to visit.       Physical  Exam  There were no vitals filed for this visit.   There is no height or weight on file to calculate BMI.  Wt Readings from Last 3 Encounters:   11/15/24 84.8 kg (186 lb 13.4 oz)   11/07/24 88.5 kg (195 lb)   10/31/24 88.5 kg (195 lb)       Physical Exam  Vitals and nursing note reviewed.   Constitutional:       General: He is not in acute distress.     Appearance: He is not ill-appearing.   HENT:      Head: Normocephalic and atraumatic.      Right Ear: External ear normal.      Left Ear: External ear normal.      Nose: Nose normal.      Mouth/Throat:      Mouth: Mucous membranes are moist.   Eyes:      Extraocular Movements: Extraocular movements intact.      Conjunctiva/sclera: Conjunctivae normal.   Cardiovascular:      Rate and Rhythm: Normal rate and regular rhythm.      Pulses: Normal pulses.      Heart sounds: No murmur heard.  Pulmonary:       "Effort: Pulmonary effort is normal. No respiratory distress.      Breath sounds: No wheezing.   Abdominal:      General: There is no distension.      Palpations: Abdomen is soft. There is no mass.      Tenderness: There is no abdominal tenderness.   Musculoskeletal:         General: No swelling.      Cervical back: Normal range of motion.   Skin:     Coloration: Skin is not jaundiced.      Findings: No rash.   Neurological:      Mental Status: He is alert and oriented to person, place, and time. Mental status is at baseline.   Psychiatric:         Mood and Affect: Mood normal.         Thought Content: Thought content normal.         Labs:    Complete Blood Count  Lab Results   Component Value Date    RBC 4.09 (L) 11/07/2024    HGB 12.0 (L) 11/07/2024    HCT 35.6 (L) 11/07/2024    MCV 87 11/07/2024    MCH 29.3 11/07/2024    MCHC 33.7 11/07/2024    RDW 13.1 11/07/2024     11/07/2024    MPV 10.2 11/07/2024    GRAN 5.3 11/07/2024    GRAN 66.5 11/07/2024    LYMPH 1.4 11/07/2024    LYMPH 17.2 (L) 11/07/2024    MONO 0.8 11/07/2024    MONO 9.6 11/07/2024    EOS 0.4 11/07/2024    BASO 0.08 11/07/2024    EOSINOPHIL 5.2 11/07/2024    BASOPHIL 1.0 11/07/2024    DIFFMETHOD Automated 11/07/2024       Comprehensive Metabolic Panel  Lab Results   Component Value Date     11/07/2024    BUN 25 (H) 11/07/2024    CREATININE 0.9 11/07/2024     11/07/2024    K 2.7 (LL) 11/07/2024     11/07/2024    PROT 6.9 11/07/2024    ALBUMIN 3.2 (L) 11/07/2024    BILITOT 1.1 (H) 11/07/2024    AST 18 11/07/2024    ALKPHOS 83 11/07/2024    CO2 24 11/07/2024    ALT 14 11/07/2024    ANIONGAP 14 11/07/2024       TSH  No results found for: "TSH"    Imaging:  CT Thoracic Spine Without Contrast  Narrative: EXAMINATION:  CT THORACIC SPINE WITHOUT CONTRAST    CLINICAL HISTORY:  Compression fracture, thoracic;  Wedge compression fracture of T7-t8 vertebra, subsequent encounter for fracture with delayed healing    TECHNIQUE:  Contiguous " axial images obtained through the thoracic spine without contrast.  Sagittal and coronal reconstructions performed.    COMPARISON:  CT dated 10/31/2024.  MRI dated 12/05/2024    FINDINGS:  There are 12 rib-bearing thoracic vertebrae.  Exaggerated thoracic kyphosis noted.  Alignment is otherwise unremarkable with no significant listhesis.    T7 compression fracture again seen with progressive loss of vertebral body height, now approximately 75%.  There is trace associated retropulsion.  Chronic appearing compression fractures of T6 and T10 are unchanged.  No new acute fracture or additional compression deformity.  No aggressive lytic or blastic lesion.    Multilevel degenerative endplate osteophytosis noted, most prominent at the mid to lower thoracic levels.  Vacuum disc phenomenon also noted at multiple levels.  Trace disc bulges noted at the T7-T8, T8-T9, T9-T10, T10-T11, and T11-T12 levels.  Bilateral facet arthropathy noted, most prominent at the mid to lower thoracic levels.  There is thickening and ossification of the ligamentum flavum at the lower thoracic levels.    Moderate spinal canal stenosis at T10-T11.  Mild spinal canal stenosis at T7-T8, T8-T9, and T9-T10.  Moderate to severe bilateral neural foraminal stenosis at T7-T8.  Additional mild to moderate bilateral neural foraminal stenosis at T5-T6, T6-T7, T8-T9, T9-T10, and T10-T11.    Paraspinal soft tissues are unremarkable.  Scattered atherosclerosis noted.  Visualized intrathoracic and upper abdominal structures are unremarkable.  Impression: T7 compression fracture with persistent visualized fracture plane and progressive loss of vertebral body height, now approximately 75%.  Minimal associated retropulsion.    Chronic appearing compression fractures of T6 and T10 are unchanged.    Multilevel degenerative changes with varying degrees of spinal canal or neural foraminal stenosis as detailed above.    Electronically signed by: Prabhu  Srikanth  Date:    12/18/2024  Time:    12:53      Assessment/Plan:    1. Hypotension, unspecified hypotension type  -     Ambulatory referral/consult to Home Health; Future; Expected date: 01/31/2025    2. Weight loss  -     Ambulatory referral/consult to Home Health; Future; Expected date: 01/31/2025    3. Prostate cancer  Assessment & Plan:  - chronic  - recently evaluated by oncology and reports he is doing well      4. Paroxysmal atrial fibrillation  Assessment & Plan:  - chronic  - RRR on exam      5. Other nonthrombocytopenic purpura  Assessment & Plan:  - chronic  - no current symtpoms      6. Aortic atherosclerosis  Assessment & Plan:  - chronic  - continue statin      7. Depression, unspecified depression type  -     mirtazapine (REMERON) 15 MG tablet; Take 1 tablet (15 mg total) by mouth every evening.  Dispense: 30 tablet; Refill: 11    8. Grief  -     mirtazapine (REMERON) 15 MG tablet; Take 1 tablet (15 mg total) by mouth every evening.  Dispense: 30 tablet; Refill: 11    9. Loss of appetite  -     mirtazapine (REMERON) 15 MG tablet; Take 1 tablet (15 mg total) by mouth every evening.  Dispense: 30 tablet; Refill: 11         Discussed how to stay healthy including: diet, exercise, refraining from smoking and discussed screening exams / tests needed for age, sex and family Hx.    RTC 4 weeks    Wu Rey MD         04-Apr-2023

## 2025-02-06 DIAGNOSIS — S22.000D COMPRESSION FRACTURE OF THORACIC VERTEBRA WITH ROUTINE HEALING, UNSPECIFIED THORACIC VERTEBRAL LEVEL, SUBSEQUENT ENCOUNTER: ICD-10-CM

## 2025-02-07 RX ORDER — TRAMADOL HYDROCHLORIDE 100 MG/1
100 TABLET, COATED ORAL 2 TIMES DAILY PRN
Qty: 60 TABLET | Refills: 0 | Status: SHIPPED | OUTPATIENT
Start: 2025-02-07

## 2025-02-10 ENCOUNTER — OFFICE VISIT (OUTPATIENT)
Dept: NEUROSURGERY | Facility: CLINIC | Age: 85
End: 2025-02-10
Payer: MEDICARE

## 2025-02-10 VITALS
WEIGHT: 186.94 LBS | DIASTOLIC BLOOD PRESSURE: 76 MMHG | HEIGHT: 68 IN | BODY MASS INDEX: 28.33 KG/M2 | HEART RATE: 82 BPM | SYSTOLIC BLOOD PRESSURE: 113 MMHG

## 2025-02-10 DIAGNOSIS — S32.030A COMPRESSION FRACTURE OF L3 VERTEBRA, INITIAL ENCOUNTER: ICD-10-CM

## 2025-02-10 DIAGNOSIS — M81.0 OSTEOPOROSIS, UNSPECIFIED OSTEOPOROSIS TYPE, UNSPECIFIED PATHOLOGICAL FRACTURE PRESENCE: ICD-10-CM

## 2025-02-10 DIAGNOSIS — S22.000D COMPRESSION FRACTURE OF THORACIC VERTEBRA WITH ROUTINE HEALING, UNSPECIFIED THORACIC VERTEBRAL LEVEL, SUBSEQUENT ENCOUNTER: Primary | ICD-10-CM

## 2025-02-10 DIAGNOSIS — M80.00XG OSTEOPOROSIS WITH CURRENT PATHOLOGICAL FRACTURE WITH DELAYED HEALING, UNSPECIFIED OSTEOPOROSIS TYPE, SUBSEQUENT ENCOUNTER: Primary | ICD-10-CM

## 2025-02-10 DIAGNOSIS — M48.062 SPINAL STENOSIS OF LUMBAR REGION WITH NEUROGENIC CLAUDICATION: ICD-10-CM

## 2025-02-10 PROCEDURE — 99999 PR PBB SHADOW E&M-EST. PATIENT-LVL III: CPT | Mod: PBBFAC,,, | Performed by: NEUROLOGICAL SURGERY

## 2025-02-10 PROCEDURE — 99213 OFFICE O/P EST LOW 20 MIN: CPT | Mod: PBBFAC,PN | Performed by: NEUROLOGICAL SURGERY

## 2025-02-10 PROCEDURE — 99214 OFFICE O/P EST MOD 30 MIN: CPT | Mod: S$PBB,,, | Performed by: NEUROLOGICAL SURGERY

## 2025-02-10 NOTE — PROGRESS NOTES
NEUROSURGICAL PROGRESS NOTE    DATE OF SERVICE:  02/10/2025    ATTENDING PHYSICIAN:  Donis Smith MD    SUBJECTIVE:  11/22/24  Teo Gloria is a 84 y.o. male who presents for follow up.  Patient fell backwards and hit his head on October 31, 2024.  He was seen in the emergency room and diagnosed with a new T7 fracture.  He was not placed in a back brace.  He continued to have the pain and a CT chest abdomen pelvis showed worsening of the fracture at T7 about a week later.  He has old fractures at T6 and T10.  He states the midback pain is the same for the last 3 weeks.  It is in the midline in the midthoracic spine region.  Does not radiate around his chest wall.  No leg pain or paresthesias.  He has been taking tramadol 50 mg twice a day Tylenol and Cymbalta.  This is helping slightly.     Patient denies any recent accidents or trauma, no saddle anesthesias, and no bowel or bladder incontinence    INTERIM HISTORY:    Reports resolution of thoracic back pain.  Suffers from difficulty standing and walking.  We will do physical therapy with home health.  Not taking calcium or vitamin-D.              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    Essential hypertension 08/14/2020    RBBB 08/14/2020    SOB (shortness of breath) 08/14/2020    Varicose veins of both lower extremities 08/14/2020    Cardiac murmur 08/14/2020    Nonrheumatic aortic valve stenosis 03/01/2021    Current chronic use of systemic steroids 07/23/2021    Paroxysmal atrial fibrillation 11/21/2023    Aortic atherosclerosis 11/21/2023    Prostate cancer 01/28/2024    Other nonthrombocytopenic purpura 01/28/2024     Resolved Ambulatory Problems     Diagnosis Date Noted    No Resolved Ambulatory Problems     Past Medical History:   Diagnosis Date    Hypertension        PAST SURGICAL HISTORY:  Past Surgical History:   Procedure Laterality Date    APPENDECTOMY      BACK SURGERY       CHOLECYSTECTOMY      COLONOSCOPY  01/01/2017    repeat Q 5 years    EYE SURGERY Bilateral     cataract    HERNIA REPAIR      SHOULDER ARTHROSCOPY Bilateral     TOTAL KNEE ARTHROPLASTY Right 1960's       SOCIAL HISTORY:   Social History     Socioeconomic History    Marital status:    Tobacco Use    Smoking status: Never     Passive exposure: Never    Smokeless tobacco: Never   Substance and Sexual Activity    Alcohol use: No    Drug use: No    Sexual activity: Not Currently     Social Drivers of Health     Financial Resource Strain: Low Risk  (11/14/2024)    Overall Financial Resource Strain (CARDIA)     Difficulty of Paying Living Expenses: Not hard at all   Food Insecurity: No Food Insecurity (11/14/2024)    Hunger Vital Sign     Worried About Running Out of Food in the Last Year: Never true     Ran Out of Food in the Last Year: Never true   Transportation Needs: No Transportation Needs (11/12/2024)    TRANSPORTATION NEEDS     Transportation : No   Physical Activity: Inactive (11/14/2024)    Exercise Vital Sign     Days of Exercise per Week: 0 days     Minutes of Exercise per Session: 0 min   Stress: Stress Concern Present (11/14/2024)    [employer] - Occupational Stress Questionnaire     Feeling of Stress : Rather much   Housing Stability: Low Risk  (11/14/2024)    Housing Stability Vital Sign     Unable to Pay for Housing in the Last Year: No     Homeless in the Last Year: No       FAMILY HISTORY:  Family History   Problem Relation Name Age of Onset    Cancer Mother      Parkinsonism Father         CURRENTS MEDICATIONS:  Current Outpatient Medications on File Prior to Visit   Medication Sig Dispense Refill    abiraterone (ZYTIGA) 250 mg Tab 250 mg 4 tablets in the morning.      aspirin (ECOTRIN) 81 MG EC tablet Take 81 mg by mouth once daily.      celecoxib (CELEBREX) 200 MG capsule Take 1 capsule (200 mg total) by mouth 2 (two) times daily as needed for Pain. 60 capsule 1     furosemide (LASIX) 40 MG tablet One tablet by mouth up to 2 times a day.  For edema. 60 tablet 2    LIDOcaine (LIDODERM) 5 % Place 1 patch onto the skin once daily. Remove & Discard patch within 12 hours or as directed by MD 30 patch 0    mirtazapine (REMERON) 15 MG tablet Take 1 tablet (15 mg total) by mouth every evening. 30 tablet 11    predniSONE (DELTASONE) 20 MG tablet One tablet daily by mouth for five days 5 tablet 0    traMADoL 100 mg Tab Take 100 mg by mouth 2 (two) times daily as needed (pain). 60 tablet 0    traZODone (DESYREL) 100 MG tablet Take 1 tablet (100 mg total) by mouth every evening. 30 tablet 11    DULoxetine (CYMBALTA) 60 MG capsule Take 1 capsule (60 mg total) by mouth once daily. 90 capsule 1    nystatin-triamcinolone (MYCOLOG II) cream Apply topically 4 (four) times daily. (Patient not taking: Reported on 11/12/2024) 60 g 1     No current facility-administered medications on file prior to visit.       ALLERGIES:  Review of patient's allergies indicates:   Allergen Reactions    Benadryl [diphenhydramine hcl] Hives    Clindamycin Hives    Trazodone      Diarrhea      Xarelto [rivaroxaban] Swelling     Knee swelling    Hydrocodone-acetaminophen Rash       REVIEW OF SYSTEMS:  Review of Systems   Constitutional:  Negative for diaphoresis, fever and weight loss.   Respiratory:  Negative for shortness of breath.    Cardiovascular:  Negative for chest pain.   Gastrointestinal:  Negative for blood in stool.   Genitourinary:  Negative for hematuria.   Endo/Heme/Allergies:  Does not bruise/bleed easily.   All other systems reviewed and are negative.        OBJECTIVE:    PHYSICAL EXAMINATION:   Vitals:    02/10/25 1344   BP: 113/76   Pulse: 82       Physical Exam:  Vitals reviewed.    Constitutional: He appears well-developed and well-nourished.     Eyes: Pupils are equal, round, and reactive to light. Conjunctivae and EOM are normal.     Cardiovascular: Normal distal pulses and no edema.      Abdominal: Soft.     Skin: Skin displays no rash on trunk and no rash on extremities. Skin displays no lesions on trunk and no lesions on extremities.     Psych/Behavior: He is alert. He is oriented to person, place, and time. He has a normal mood and affect.     Musculoskeletal:        Neck: Range of motion is limited.       Ortho Exam        Neurological Exam  Mental Status  Alert. Oriented to person, place, and time.    Cranial Nerves  CN III, IV, VI: Extraocular movements intact bilaterally. Pupils equal round and reactive to light bilaterally.    Motor   Strength is 5/5 throughout all four extremities.        DIAGNOSTIC DATA:  I personally interpreted the following imaging:   Lumbar spine MRI shows severe stenosis at L3-4 and L4-5, status post L5-S1 laminotomy  Most recent CT of the thoracic spine shows T6 and T7 compression fractures not causing significant stenosis, chronic T11 compression fracture    ASSESSMENT:  This is a 84 y.o. male with     Problem List Items Addressed This Visit    None  Visit Diagnoses       Osteoporosis with current pathological fracture with delayed healing, unspecified osteoporosis type, subsequent encounter    -  Primary    Spinal stenosis of lumbar region with neurogenic claudication                  PLAN:  Continue conservative management  Calcium vitamin-D supplementation  Consider Prolia injections every 6 months  Patient is not interested in surgical treatment of his lumbar spinal stenosis at this time  All questions answered    More than 50% of the time was spent on discussing conservative management treatments (medication, physical therapy exercises) and possible interventions (spinal injections and surgical procedures). Care coordination was discussed.    30 min        Donis Smith MD  Cell:[phone]

## 2025-02-11 NOTE — TELEPHONE ENCOUNTER
We sent in a prescription for a medication the should help keep your bone density.  It is called Prolia-given every six months.

## 2025-02-11 NOTE — TELEPHONE ENCOUNTER
No care due was identified.  [employer] Embedded Care Due Messages. Reference number: [id number].   2/11/2025 3:58:59 PM CST

## 2025-02-12 ENCOUNTER — TELEPHONE (OUTPATIENT)
Dept: FAMILY MEDICINE | Facility: CLINIC | Age: 85
End: 2025-02-12
Payer: MEDICARE

## 2025-02-12 NOTE — TELEPHONE ENCOUNTER
----- Message from Paty sent at 2/12/2025 12:00 PM CST -----  Type:  Needs Medical Advice    Who Called: [facility]   Symptoms (please be specific): update of diagnosis for orthopaedic hypertension instead of  hypotension unspecified (caller states that she cannot use unspecified)   Would the patient rather a call back or a response via [facility]? Call back   Best Call Back Number: [phone]

## 2025-02-12 NOTE — TELEPHONE ENCOUNTER
FYI:     I spoke with Zeny at [facility] regarding Mr. Gloria. Zeny has requested a verbal order to update pt's diagnosis to orthostatic hypotension instead of hypotension unspecified for billing purposes. Verbal order provided.

## 2025-02-14 ENCOUNTER — TELEPHONE (OUTPATIENT)
Dept: FAMILY MEDICINE | Facility: CLINIC | Age: 85
End: 2025-02-14
Payer: MEDICARE

## 2025-02-14 DIAGNOSIS — I10 ESSENTIAL HYPERTENSION: ICD-10-CM

## 2025-02-14 DIAGNOSIS — C61 PROSTATE CANCER: ICD-10-CM

## 2025-02-14 DIAGNOSIS — M81.0 OSTEOPOROSIS, UNSPECIFIED OSTEOPOROSIS TYPE, UNSPECIFIED PATHOLOGICAL FRACTURE PRESENCE: Primary | ICD-10-CM

## 2025-02-14 NOTE — TELEPHONE ENCOUNTER
----- Message from Pharmacist Pako sent at 2/14/2025  4:25 PM CST -----  Regarding: Prolia appt  Good afternoon,    In order for [facility] to dispense medication to send  to office, an appt is required. Please set up an appt for Pt. Once completed, [facility] will reach out to Pt to discuss medication. Please set up an appt and send  address to where [facility] will deliver to. Please advise.    Thank you,  Pako Jimenez, PharmD  Clinical Pharmacist    [facility] [place] [address] [place] [address]  Phone: [phone]  Fax: [phone]

## 2025-02-14 NOTE — TELEPHONE ENCOUNTER
Ordered basic metabolic panel-order that is standing-he can do every six months prior to injection of Prolia.  For up to 10 years.

## 2025-02-14 NOTE — TELEPHONE ENCOUNTER
set up calcium after Dr Hector places the order     [facility] rtn to staff to schedule    Call pt to schedule prolia injection in about 2 weeks and calcium prior to nurse visit

## 2025-02-20 ENCOUNTER — PATIENT MESSAGE (OUTPATIENT)
Dept: FAMILY MEDICINE | Facility: CLINIC | Age: 85
End: 2025-02-20
Payer: MEDICARE

## 2025-02-21 DIAGNOSIS — Z00.00 ENCOUNTER FOR MEDICARE ANNUAL WELLNESS EXAM: ICD-10-CM

## 2025-02-27 ENCOUNTER — OFFICE VISIT (OUTPATIENT)
Dept: FAMILY MEDICINE | Facility: CLINIC | Age: 85
End: 2025-02-27
Payer: MEDICARE

## 2025-02-27 DIAGNOSIS — R60.0 LOWER LEG EDEMA: ICD-10-CM

## 2025-02-27 DIAGNOSIS — S32.030A COMPRESSION FRACTURE OF L3 VERTEBRA, INITIAL ENCOUNTER: ICD-10-CM

## 2025-02-27 DIAGNOSIS — F51.01 PRIMARY INSOMNIA: ICD-10-CM

## 2025-02-27 DIAGNOSIS — R63.0 LOSS OF APPETITE: ICD-10-CM

## 2025-02-27 DIAGNOSIS — I48.0 PAROXYSMAL ATRIAL FIBRILLATION: ICD-10-CM

## 2025-02-27 DIAGNOSIS — F32.A DEPRESSION, UNSPECIFIED DEPRESSION TYPE: Primary | ICD-10-CM

## 2025-02-27 DIAGNOSIS — S22.000D COMPRESSION FRACTURE OF THORACIC VERTEBRA WITH ROUTINE HEALING, UNSPECIFIED THORACIC VERTEBRAL LEVEL, SUBSEQUENT ENCOUNTER: ICD-10-CM

## 2025-02-27 DIAGNOSIS — F43.21 GRIEF: ICD-10-CM

## 2025-02-27 DIAGNOSIS — I10 ESSENTIAL HYPERTENSION: ICD-10-CM

## 2025-02-27 RX ORDER — CELECOXIB 200 MG/1
200 CAPSULE ORAL 2 TIMES DAILY PRN
Qty: 180 CAPSULE | Refills: 1 | Status: SHIPPED | OUTPATIENT
Start: 2025-02-27

## 2025-02-27 RX ORDER — DULOXETIN HYDROCHLORIDE 60 MG/1
60 CAPSULE, DELAYED RELEASE ORAL DAILY
Qty: 180 CAPSULE | Refills: 3 | Status: SHIPPED | OUTPATIENT
Start: 2025-02-27

## 2025-02-27 RX ORDER — TRAZODONE HYDROCHLORIDE 100 MG/1
100 TABLET ORAL NIGHTLY
Qty: 90 TABLET | Refills: 3 | Status: SHIPPED | OUTPATIENT
Start: 2025-02-27 | End: 2026-02-27

## 2025-02-27 RX ORDER — FUROSEMIDE 40 MG/1
TABLET ORAL
Qty: 180 TABLET | Refills: 3 | Status: SHIPPED | OUTPATIENT
Start: 2025-02-27

## 2025-02-27 RX ORDER — MIRTAZAPINE 15 MG/1
15 TABLET, FILM COATED ORAL NIGHTLY
Qty: 90 TABLET | Refills: 3 | Status: SHIPPED | OUTPATIENT
Start: 2025-02-27

## 2025-02-28 ENCOUNTER — EXTERNAL CHRONIC CARE MANAGEMENT (OUTPATIENT)
Dept: PRIMARY CARE CLINIC | Facility: CLINIC | Age: 85
End: 2025-02-28
Payer: MEDICARE

## 2025-02-28 PROCEDURE — 99490 CHRNC CARE MGMT STAFF 1ST 20: CPT | Mod: S$GLB,,, | Performed by: FAMILY MEDICINE

## 2025-03-04 NOTE — PROGRESS NOTES
This patient is being seen at home due to physical debility that presents a taxing effort to leave the home, to mitigate high risk of hospital readmission and/or due to the limited availability of reliable or safe options for transportation to the point of access to the provider. Prior to treatment on this visit the chart was reviewed and patient verbal consent was obtained.     Patient ID: Teo Gloria is a 84 y.o. male.     Chief Complaint: follow up cymbalta    HPI  Patient is accompanied by his son and daughter in law. They report improvement in mood since our last visit. Patient is eating more and is even ordering food from a local restaurant. He is ambulating with a walker inside of his home. He is sleeping well. He is taking lasix as needed for lower leg swelling.     Review of Systems  Review of Systems   Constitutional:  Negative for fever.   HENT:  Negative for ear pain and sinus pain.    Eyes:  Negative for discharge.   Respiratory:  Negative for cough and shortness of breath.    Cardiovascular:  Negative for chest pain and leg swelling.   Gastrointestinal:  Negative for diarrhea, nausea and vomiting.   Genitourinary:  Negative for urgency.   Musculoskeletal:  Negative for myalgias.   Skin:  Negative for rash.   Neurological:  Negative for weakness and headaches.   Psychiatric/Behavioral:  Negative for depression.    All other systems reviewed and are negative.      Currently Medications  Medications Ordered Prior to Encounter[1]    Physical  Exam  There were no vitals filed for this visit.   There is no height or weight on file to calculate BMI.  Wt Readings from Last 3 Encounters:   02/10/25 84.8 kg (186 lb 15.2 oz)   11/15/24 84.8 kg (186 lb 13.4 oz)   11/07/24 88.5 kg (195 lb)       Physical Exam  Vitals and nursing note reviewed.   Constitutional:       General: He is not in acute distress.     Appearance: He is obese. He is not ill-appearing.   HENT:      Head: Normocephalic and atraumatic.       Right Ear: External ear normal.      Left Ear: External ear normal.      Nose: Nose normal.      Mouth/Throat:      Mouth: Mucous membranes are moist.   Eyes:      Extraocular Movements: Extraocular movements intact.      Conjunctiva/sclera: Conjunctivae normal.   Cardiovascular:      Rate and Rhythm: Normal rate and regular rhythm.      Pulses: Normal pulses.      Heart sounds: No murmur heard.  Pulmonary:      Effort: Pulmonary effort is normal. No respiratory distress.      Breath sounds: No wheezing.   Abdominal:      General: There is no distension.      Palpations: Abdomen is soft. There is no mass.      Tenderness: There is no abdominal tenderness.   Musculoskeletal:         General: No swelling.      Cervical back: Normal range of motion.   Skin:     Coloration: Skin is not jaundiced.      Findings: No rash.   Neurological:      General: No focal deficit present.      Mental Status: He is alert and oriented to person, place, and time.   Psychiatric:         Mood and Affect: Mood normal.         Thought Content: Thought content normal.         Labs:    Complete Blood Count  Lab Results   Component Value Date    RBC 4.09 (L) 11/07/2024    HGB 12.0 (L) 11/07/2024    HCT 35.6 (L) 11/07/2024    MCV 87 11/07/2024    MCH 29.3 11/07/2024    MCHC 33.7 11/07/2024    RDW 13.1 11/07/2024     11/07/2024    MPV 10.2 11/07/2024    GRAN 5.3 11/07/2024    GRAN 66.5 11/07/2024    LYMPH 1.4 11/07/2024    LYMPH 17.2 (L) 11/07/2024    MONO 0.8 11/07/2024    MONO 9.6 11/07/2024    EOS 0.4 11/07/2024    BASO 0.08 11/07/2024    EOSINOPHIL 5.2 11/07/2024    BASOPHIL 1.0 11/07/2024    DIFFMETHOD Automated 11/07/2024       Comprehensive Metabolic Panel  Lab Results   Component Value Date     11/07/2024    BUN 25 (H) 11/07/2024    CREATININE 0.9 11/07/2024     11/07/2024    K 2.7 (LL) 11/07/2024     11/07/2024    PROT 6.9 11/07/2024    ALBUMIN 3.2 (L) 11/07/2024    BILITOT 1.1 (H) 11/07/2024    AST 18  "11/07/2024    ALKPHOS 83 11/07/2024    CO2 24 11/07/2024    ALT 14 11/07/2024    ANIONGAP 14 11/07/2024       TSH  No results found for: "TSH"    Imaging:  CT Thoracic Spine Without Contrast  Narrative: EXAMINATION:  CT THORACIC SPINE WITHOUT CONTRAST    CLINICAL HISTORY:  Compression fracture, thoracic;  Wedge compression fracture of T7-t8 vertebra, subsequent encounter for fracture with delayed healing    TECHNIQUE:  Contiguous axial images obtained through the thoracic spine without contrast.  Sagittal and coronal reconstructions performed.    COMPARISON:  CT dated 10/31/2024.  MRI dated 12/05/2024    FINDINGS:  There are 12 rib-bearing thoracic vertebrae.  Exaggerated thoracic kyphosis noted.  Alignment is otherwise unremarkable with no significant listhesis.    T7 compression fracture again seen with progressive loss of vertebral body height, now approximately 75%.  There is trace associated retropulsion.  Chronic appearing compression fractures of T6 and T10 are unchanged.  No new acute fracture or additional compression deformity.  No aggressive lytic or blastic lesion.    Multilevel degenerative endplate osteophytosis noted, most prominent at the mid to lower thoracic levels.  Vacuum disc phenomenon also noted at multiple levels.  Trace disc bulges noted at the T7-T8, T8-T9, T9-T10, T10-T11, and T11-T12 levels.  Bilateral facet arthropathy noted, most prominent at the mid to lower thoracic levels.  There is thickening and ossification of the ligamentum flavum at the lower thoracic levels.    Moderate spinal canal stenosis at T10-T11.  Mild spinal canal stenosis at T7-T8, T8-T9, and T9-T10.  Moderate to severe bilateral neural foraminal stenosis at T7-T8.  Additional mild to moderate bilateral neural foraminal stenosis at T5-T6, T6-T7, T8-T9, T9-T10, and T10-T11.    Paraspinal soft tissues are unremarkable.  Scattered atherosclerosis noted.  Visualized intrathoracic and upper abdominal structures are " unremarkable.  Impression: T7 compression fracture with persistent visualized fracture plane and progressive loss of vertebral body height, now approximately 75%.  Minimal associated retropulsion.    Chronic appearing compression fractures of T6 and T10 are unchanged.    Multilevel degenerative changes with varying degrees of spinal canal or neural foraminal stenosis as detailed above.    Electronically signed by: Prabhu Duke  Date:    12/18/2024  Time:    12:53      Assessment/Plan:    1. Depression, unspecified depression type  -     DULoxetine (CYMBALTA) 60 MG capsule; Take 1 capsule (60 mg total) by mouth once daily.  Dispense: 180 capsule; Refill: 3  -     mirtazapine (REMERON) 15 MG tablet; Take 1 tablet (15 mg total) by mouth every evening.  Dispense: 90 tablet; Refill: 3    2. Grief  -     DULoxetine (CYMBALTA) 60 MG capsule; Take 1 capsule (60 mg total) by mouth once daily.  Dispense: 180 capsule; Refill: 3  -     mirtazapine (REMERON) 15 MG tablet; Take 1 tablet (15 mg total) by mouth every evening.  Dispense: 90 tablet; Refill: 3    3. Loss of appetite  -     mirtazapine (REMERON) 15 MG tablet; Take 1 tablet (15 mg total) by mouth every evening.  Dispense: 90 tablet; Refill: 3    4. Primary insomnia  -     traZODone (DESYREL) 100 MG tablet; Take 1 tablet (100 mg total) by mouth every evening.  Dispense: 90 tablet; Refill: 3    5. Essential hypertension    6. Compression fracture of thoracic vertebra with routine healing, unspecified thoracic vertebral level, subsequent encounter  -     celecoxib (CELEBREX) 200 MG capsule; Take 1 capsule (200 mg total) by mouth 2 (two) times daily as needed for Pain.  Dispense: 180 capsule; Refill: 1    7. Compression fracture of L3 vertebra, initial encounter  -     celecoxib (CELEBREX) 200 MG capsule; Take 1 capsule (200 mg total) by mouth 2 (two) times daily as needed for Pain.  Dispense: 180 capsule; Refill: 1    8. Paroxysmal atrial fibrillation    9. Lower leg  edema  -     furosemide (LASIX) 40 MG tablet; One tablet by mouth up to 2 times a day.  For edema.  Dispense: 180 tablet; Refill: 3         Discussed how to stay healthy including: diet, exercise, refraining from smoking and discussed screening exams / tests needed for age, sex and family Hx.    RTC 4 mo    Wu Rey MD           [1]   Current Outpatient Medications on File Prior to Visit   Medication Sig Dispense Refill    abiraterone (ZYTIGA) 250 mg Tab 250 mg 4 tablets in the morning.      aspirin (ECOTRIN) 81 MG EC tablet Take 81 mg by mouth once daily.      traMADoL 100 mg Tab Take 100 mg by mouth 2 (two) times daily as needed (pain). 60 tablet 0     No current facility-administered medications on file prior to visit.

## 2025-03-10 ENCOUNTER — TELEPHONE (OUTPATIENT)
Dept: FAMILY MEDICINE | Facility: CLINIC | Age: 85
End: 2025-03-10
Payer: MEDICARE

## 2025-03-10 NOTE — TELEPHONE ENCOUNTER
----- Message from Wu Rey MD sent at 3/10/2025  8:50 AM CDT -----  thursdays  ----- Message -----  From: Livia Osorio LPN  Sent: 3/5/2025  11:47 AM CDT  To: Wu Rey MD    Do you have a particular day of the week to do your house calls? I want to ensure that I schedule him appropriately.-Livia  ----- Message -----  From: Wu Rey MD  Sent: 3/4/2025   4:19 PM CST  To: Krissy Washburn Staff    Please schedule 4 month home visit follow up

## 2025-03-13 ENCOUNTER — PATIENT MESSAGE (OUTPATIENT)
Dept: FAMILY MEDICINE | Facility: CLINIC | Age: 85
End: 2025-03-13
Payer: MEDICARE

## 2025-03-13 DIAGNOSIS — R63.0 LOSS OF APPETITE: ICD-10-CM

## 2025-03-13 DIAGNOSIS — F43.21 GRIEF: ICD-10-CM

## 2025-03-13 DIAGNOSIS — R60.0 LOWER LEG EDEMA: ICD-10-CM

## 2025-03-13 DIAGNOSIS — F51.01 PRIMARY INSOMNIA: ICD-10-CM

## 2025-03-13 DIAGNOSIS — F32.A DEPRESSION, UNSPECIFIED DEPRESSION TYPE: ICD-10-CM

## 2025-03-13 RX ORDER — FUROSEMIDE 40 MG/1
TABLET ORAL
Qty: 180 TABLET | Refills: 3 | Status: CANCELLED | OUTPATIENT
Start: 2025-03-13

## 2025-03-13 RX ORDER — MIRTAZAPINE 15 MG/1
15 TABLET, FILM COATED ORAL NIGHTLY
Qty: 30 TABLET | Refills: 3 | Status: SHIPPED | OUTPATIENT
Start: 2025-03-13

## 2025-03-13 RX ORDER — FUROSEMIDE 40 MG/1
TABLET ORAL
Qty: 60 TABLET | Refills: 3 | Status: SHIPPED | OUTPATIENT
Start: 2025-03-13

## 2025-03-13 NOTE — TELEPHONE ENCOUNTER
----- Message from Sunday sent at 3/13/2025  9:03 AM CDT -----  Contact: pt  Type: Requesting to speak with nurseArnie Called: Mary Regarding: questions about these 3 medications  #traZODone (DESYREL) 100 MG tablet , mirtazapine (REMERON) 15 MG tablet and furosemide (LASIX) 40 MG tablet Would the patient rather a call back or a response via [facility]? Call [facility] Call Back Number:  Additional Information:

## 2025-03-13 NOTE — TELEPHONE ENCOUNTER
No care due was identified.  [facility] Embedded Care Due Messages. Reference number: [id number].   3/13/2025 8:46:16 AM CDT

## 2025-03-14 DIAGNOSIS — F51.01 PRIMARY INSOMNIA: ICD-10-CM

## 2025-03-14 RX ORDER — TRAZODONE HYDROCHLORIDE 100 MG/1
100 TABLET ORAL NIGHTLY
Qty: 30 TABLET | Refills: 3 | Status: SHIPPED | OUTPATIENT
Start: 2025-03-14

## 2025-03-14 NOTE — TELEPHONE ENCOUNTER
No care due was identified.  [facility] Embedded Care Due Messages. Reference number: [id number].   3/14/2025 11:28:51 AM CDT

## 2025-03-17 NOTE — ADDENDUM NOTE
Addended by: MARLENE MCDANIEL on: 11/19/2024 02:23 PM     Modules accepted: Orders    
disruptive behavior

## 2025-03-28 ENCOUNTER — DOCUMENT SCAN (OUTPATIENT)
Dept: HOME HEALTH SERVICES | Facility: HOSPITAL | Age: 85
End: 2025-03-28
Payer: MEDICARE

## 2025-03-31 ENCOUNTER — EXTERNAL CHRONIC CARE MANAGEMENT (OUTPATIENT)
Dept: PRIMARY CARE CLINIC | Facility: CLINIC | Age: 85
End: 2025-03-31
Payer: MEDICARE

## 2025-03-31 PROCEDURE — 99490 CHRNC CARE MGMT STAFF 1ST 20: CPT | Mod: S$GLB,,, | Performed by: FAMILY MEDICINE

## 2025-04-04 ENCOUNTER — EXTERNAL HOME HEALTH (OUTPATIENT)
Dept: HOME HEALTH SERVICES | Facility: HOSPITAL | Age: 85
End: 2025-04-04
Payer: MEDICARE

## 2025-04-30 ENCOUNTER — EXTERNAL CHRONIC CARE MANAGEMENT (OUTPATIENT)
Dept: PRIMARY CARE CLINIC | Facility: CLINIC | Age: 85
End: 2025-04-30
Payer: MEDICARE

## 2025-04-30 PROCEDURE — 99490 CHRNC CARE MGMT STAFF 1ST 20: CPT | Mod: S$GLB,,, | Performed by: FAMILY MEDICINE

## 2025-05-06 ENCOUNTER — PATIENT MESSAGE (OUTPATIENT)
Dept: FAMILY MEDICINE | Facility: CLINIC | Age: 85
End: 2025-05-06
Payer: MEDICARE

## 2025-05-06 DIAGNOSIS — L72.0 EPIDERMAL INCLUSION CYST: Primary | ICD-10-CM

## 2025-05-07 RX ORDER — SULFAMETHOXAZOLE AND TRIMETHOPRIM 800; 160 MG/1; MG/1
1 TABLET ORAL 2 TIMES DAILY
Qty: 10 TABLET | Refills: 0 | Status: SHIPPED | OUTPATIENT
Start: 2025-05-07 | End: 2025-05-12

## 2025-05-31 ENCOUNTER — EXTERNAL CHRONIC CARE MANAGEMENT (OUTPATIENT)
Dept: PRIMARY CARE CLINIC | Facility: CLINIC | Age: 85
End: 2025-05-31
Payer: MEDICARE

## 2025-05-31 PROCEDURE — 99490 CHRNC CARE MGMT STAFF 1ST 20: CPT | Mod: S$GLB,,, | Performed by: FAMILY MEDICINE

## 2025-06-30 ENCOUNTER — EXTERNAL CHRONIC CARE MANAGEMENT (OUTPATIENT)
Dept: PRIMARY CARE CLINIC | Facility: CLINIC | Age: 85
End: 2025-06-30
Payer: MEDICARE

## 2025-06-30 PROCEDURE — 99490 CHRNC CARE MGMT STAFF 1ST 20: CPT | Mod: S$GLB,,, | Performed by: FAMILY MEDICINE

## 2025-07-10 ENCOUNTER — OFFICE VISIT (OUTPATIENT)
Dept: FAMILY MEDICINE | Facility: CLINIC | Age: 85
End: 2025-07-10
Payer: MEDICARE

## 2025-07-10 DIAGNOSIS — S22.000D COMPRESSION FRACTURE OF THORACIC VERTEBRA WITH ROUTINE HEALING, UNSPECIFIED THORACIC VERTEBRAL LEVEL, SUBSEQUENT ENCOUNTER: ICD-10-CM

## 2025-07-10 DIAGNOSIS — F32.A DEPRESSION, UNSPECIFIED DEPRESSION TYPE: ICD-10-CM

## 2025-07-10 DIAGNOSIS — I48.0 PAROXYSMAL ATRIAL FIBRILLATION: ICD-10-CM

## 2025-07-10 DIAGNOSIS — I10 ESSENTIAL HYPERTENSION: Primary | ICD-10-CM

## 2025-07-10 DIAGNOSIS — F43.21 GRIEF: ICD-10-CM

## 2025-07-10 DIAGNOSIS — R60.0 LOWER LEG EDEMA: ICD-10-CM

## 2025-07-10 DIAGNOSIS — I35.0 NONRHEUMATIC AORTIC VALVE STENOSIS: Chronic | ICD-10-CM

## 2025-07-10 DIAGNOSIS — M81.0 OSTEOPOROSIS, UNSPECIFIED OSTEOPOROSIS TYPE, UNSPECIFIED PATHOLOGICAL FRACTURE PRESENCE: ICD-10-CM

## 2025-07-10 DIAGNOSIS — F51.01 PRIMARY INSOMNIA: ICD-10-CM

## 2025-07-10 DIAGNOSIS — S32.030A COMPRESSION FRACTURE OF L3 VERTEBRA, INITIAL ENCOUNTER: ICD-10-CM

## 2025-07-10 NOTE — Clinical Note
Please call patient and schedule home visit for 4 months. On a Thursday afternoon. Put in last spot but let them know that I will be there around 530

## 2025-07-11 ENCOUNTER — TELEPHONE (OUTPATIENT)
Dept: FAMILY MEDICINE | Facility: CLINIC | Age: 85
End: 2025-07-11
Payer: MEDICARE

## 2025-07-11 RX ORDER — CELECOXIB 200 MG/1
200 CAPSULE ORAL NIGHTLY
Qty: 90 CAPSULE | Refills: 3 | Status: SHIPPED | OUTPATIENT
Start: 2025-07-11

## 2025-07-11 RX ORDER — TRAZODONE HYDROCHLORIDE 100 MG/1
100 TABLET ORAL NIGHTLY
Qty: 30 TABLET | Refills: 3 | Status: SHIPPED | OUTPATIENT
Start: 2025-07-11

## 2025-07-11 RX ORDER — FUROSEMIDE 40 MG/1
TABLET ORAL
Qty: 90 TABLET | Refills: 3 | Status: SHIPPED | OUTPATIENT
Start: 2025-07-11

## 2025-07-11 RX ORDER — DULOXETIN HYDROCHLORIDE 60 MG/1
60 CAPSULE, DELAYED RELEASE ORAL DAILY
Qty: 90 CAPSULE | Refills: 3 | Status: SHIPPED | OUTPATIENT
Start: 2025-07-11

## 2025-07-11 NOTE — PROGRESS NOTES
This patient is being seen at home due to physical debility that presents a taxing effort to leave the home, to mitigate high risk of hospital readmission and/or due to the limited availability of reliable or safe options for transportation to the point of access to the provider. Prior to treatment on this visit the chart was reviewed and patient verbal consent was obtained.     Patient ID: Teo Gloria is a 85 y.o. male.     Chief Complaint: f/u chronic medical conditions    HPI  Patient presents accompanied by son and daughter in law. He has been feeling well. Mood is improved. He is not feeling depressed. He would like to wean off of medications. He reports a good appetite and has gained some weight. He is sleeping well. Denies problems with bowel movements.     Review of Systems  Review of Systems   Constitutional:  Negative for fever.   HENT:  Negative for ear pain and sinus pain.    Eyes:  Negative for discharge.   Respiratory:  Negative for cough and shortness of breath.    Cardiovascular:  Negative for chest pain and leg swelling.   Gastrointestinal:  Negative for diarrhea, nausea and vomiting.   Genitourinary:  Negative for urgency.   Musculoskeletal:  Negative for myalgias.   Skin:  Negative for rash.   Neurological:  Negative for weakness and headaches.   Psychiatric/Behavioral:  Negative for depression.    All other systems reviewed and are negative.      Currently Medications  Medications Ordered Prior to Encounter[1]    Physical  Exam  There were no vitals filed for this visit.   There is no height or weight on file to calculate BMI.  Wt Readings from Last 3 Encounters:   02/10/25 84.8 kg (186 lb 15.2 oz)   11/15/24 84.8 kg (186 lb 13.4 oz)   11/07/24 88.5 kg (195 lb)       Physical Exam  Vitals and nursing note reviewed.   Constitutional:       General: He is not in acute distress.     Appearance: He is obese. He is not ill-appearing.   HENT:      Head: Normocephalic and atraumatic.      Right Ear:  "External ear normal.      Left Ear: External ear normal.      Nose: Nose normal.      Mouth/Throat:      Mouth: Mucous membranes are moist.   Eyes:      Extraocular Movements: Extraocular movements intact.      Conjunctiva/sclera: Conjunctivae normal.   Cardiovascular:      Rate and Rhythm: Normal rate and regular rhythm.      Pulses: Normal pulses.      Heart sounds: No murmur heard.  Pulmonary:      Effort: Pulmonary effort is normal. No respiratory distress.      Breath sounds: No wheezing.   Abdominal:      General: There is no distension.      Palpations: Abdomen is soft. There is no mass.      Tenderness: There is no abdominal tenderness.   Musculoskeletal:         General: No swelling.      Cervical back: Normal range of motion.   Skin:     Coloration: Skin is not jaundiced.      Findings: No rash.   Neurological:      General: No focal deficit present.      Mental Status: He is alert and oriented to person, place, and time.   Psychiatric:         Mood and Affect: Mood normal.         Thought Content: Thought content normal.         Labs:    Complete Blood Count  Lab Results   Component Value Date    RBC 4.09 (L) 11/07/2024    HGB 12.0 (L) 11/07/2024    HCT 35.6 (L) 11/07/2024    MCV 87 11/07/2024    MCH 29.3 11/07/2024    MCHC 33.7 11/07/2024    RDW 13.1 11/07/2024     11/07/2024    MPV 10.2 11/07/2024    GRAN 5.3 11/07/2024    GRAN 66.5 11/07/2024    LYMPH 1.4 11/07/2024    LYMPH 17.2 (L) 11/07/2024    MONO 0.8 11/07/2024    MONO 9.6 11/07/2024    EOS 0.4 11/07/2024    BASO 0.08 11/07/2024    EOSINOPHIL 5.2 11/07/2024    BASOPHIL 1.0 11/07/2024    DIFFMETHOD Automated 11/07/2024       Comprehensive Metabolic Panel  No results found for: "GLU", "BUN", "CREATININE", "NA", "K", "CL", "PROT", "ALBUMIN", "BILITOT", "AST", "ALKPHOS", "CO2", "ALT", "ANIONGAP", "EGFRNONAA", "ESTGFRAFRICA"    TSH  No results found for: "TSH"    Imaging:  CT Thoracic Spine Without Contrast  Narrative: EXAMINATION:  CT THORACIC " SPINE WITHOUT CONTRAST    CLINICAL HISTORY:  Compression fracture, thoracic;  Wedge compression fracture of T7-t8 vertebra, subsequent encounter for fracture with delayed healing    TECHNIQUE:  Contiguous axial images obtained through the thoracic spine without contrast.  Sagittal and coronal reconstructions performed.    COMPARISON:  CT dated 10/31/2024.  MRI dated 12/05/2024    FINDINGS:  There are 12 rib-bearing thoracic vertebrae.  Exaggerated thoracic kyphosis noted.  Alignment is otherwise unremarkable with no significant listhesis.    T7 compression fracture again seen with progressive loss of vertebral body height, now approximately 75%.  There is trace associated retropulsion.  Chronic appearing compression fractures of T6 and T10 are unchanged.  No new acute fracture or additional compression deformity.  No aggressive lytic or blastic lesion.    Multilevel degenerative endplate osteophytosis noted, most prominent at the mid to lower thoracic levels.  Vacuum disc phenomenon also noted at multiple levels.  Trace disc bulges noted at the T7-T8, T8-T9, T9-T10, T10-T11, and T11-T12 levels.  Bilateral facet arthropathy noted, most prominent at the mid to lower thoracic levels.  There is thickening and ossification of the ligamentum flavum at the lower thoracic levels.    Moderate spinal canal stenosis at T10-T11.  Mild spinal canal stenosis at T7-T8, T8-T9, and T9-T10.  Moderate to severe bilateral neural foraminal stenosis at T7-T8.  Additional mild to moderate bilateral neural foraminal stenosis at T5-T6, T6-T7, T8-T9, T9-T10, and T10-T11.    Paraspinal soft tissues are unremarkable.  Scattered atherosclerosis noted.  Visualized intrathoracic and upper abdominal structures are unremarkable.  Impression: T7 compression fracture with persistent visualized fracture plane and progressive loss of vertebral body height, now approximately 75%.  Minimal associated retropulsion.    Chronic appearing compression  fractures of T6 and T10 are unchanged.    Multilevel degenerative changes with varying degrees of spinal canal or neural foraminal stenosis as detailed above.    Electronically signed by: Prabhu Duke  Date:    12/18/2024  Time:    12:53      Assessment/Plan:    1. Essential hypertension    2. Paroxysmal atrial fibrillation    3. Nonrheumatic aortic valve stenosis    4. Depression, unspecified depression type    5. Compression fracture of thoracic vertebra with routine healing, unspecified thoracic vertebral level, subsequent encounter    6. Compression fracture of L3 vertebra, initial encounter    7. Osteoporosis, unspecified osteoporosis type, unspecified pathological fracture presence       Cut mirtazapine in half for 2 weeks then stop medication.    Will wean celebrex and take only at night    Discussed how to stay healthy including: diet, exercise, refraining from smoking and discussed screening exams / tests needed for age, sex and family Hx.    RTC 4 mo    Wu Rey MD         [1]   Current Outpatient Medications on File Prior to Visit   Medication Sig Dispense Refill    abiraterone (ZYTIGA) 250 mg Tab 250 mg 4 tablets in the morning.      aspirin (ECOTRIN) 81 MG EC tablet Take 81 mg by mouth once daily.      celecoxib (CELEBREX) 200 MG capsule Take 1 capsule (200 mg total) by mouth 2 (two) times daily as needed for Pain. 180 capsule 1    DULoxetine (CYMBALTA) 60 MG capsule Take 1 capsule (60 mg total) by mouth once daily. 180 capsule 3    furosemide (LASIX) 40 MG tablet One tablet by mouth up to 2 times a day.  For edema. 60 tablet 3    mirtazapine (REMERON) 15 MG tablet Take 1 tablet (15 mg total) by mouth every evening. 30 tablet 3    traMADoL 100 mg Tab Take 100 mg by mouth 2 (two) times daily as needed (pain). 60 tablet 0    traZODone (DESYREL) 100 MG tablet TAKE 1 TABLET BY MOUTH EVERY EVENING 30 tablet 3     No current facility-administered medications on file prior to visit.

## 2025-07-11 NOTE — TELEPHONE ENCOUNTER
I have spoken to pt's son and scheduled pt for 11/13/25 Thursday for house call at 5:30. I have informed son to disregard the time that is listed on the portal. That MD will be coming for 5:30

## 2025-07-11 NOTE — TELEPHONE ENCOUNTER
LMV for son to call back clinic. Please see message below from Dr. Rey    ----- Message from Wu Rey MD sent at 7/11/2025 12:15 PM CDT -----  Please call patient and schedule home visit for 4 months. On a Thursday afternoon. Put in last spot but let them know that I will be there around 530

## 2025-07-11 NOTE — TELEPHONE ENCOUNTER
Copied from CRM #[id number]. Topic: General Inquiry - Return Call  >> Jul 11, 2025  2:43 PM Cortney wrote:  Type:  Patient Returning Call    Who Called: pt's son   Who Left Message for Patient: Lizzeth   Does the patient know what this is regarding?: returning missed call   Would the patient rather a call back or a response via [facility]? Call   Best Call Back Number:[phone]   Additional Information:

## 2025-07-31 ENCOUNTER — EXTERNAL CHRONIC CARE MANAGEMENT (OUTPATIENT)
Dept: PRIMARY CARE CLINIC | Facility: CLINIC | Age: 85
End: 2025-07-31
Payer: MEDICARE

## 2025-07-31 PROCEDURE — 99490 CHRNC CARE MGMT STAFF 1ST 20: CPT | Mod: S$GLB,,, | Performed by: FAMILY MEDICINE

## 2025-08-20 DIAGNOSIS — S22.000D COMPRESSION FRACTURE OF THORACIC VERTEBRA WITH ROUTINE HEALING, UNSPECIFIED THORACIC VERTEBRAL LEVEL, SUBSEQUENT ENCOUNTER: ICD-10-CM

## 2025-08-20 DIAGNOSIS — S32.030A COMPRESSION FRACTURE OF L3 VERTEBRA, INITIAL ENCOUNTER: ICD-10-CM

## 2025-08-20 RX ORDER — CELECOXIB 200 MG/1
CAPSULE ORAL
Qty: 180 CAPSULE | Refills: 1 | Status: SHIPPED | OUTPATIENT
Start: 2025-08-20

## (undated) DEVICE — GLOVE SURGICAL LATEX SZ 8

## (undated) DEVICE — DRAPE INCISE IOBAN 2 23X33IN

## (undated) DEVICE — DRAPE STERI U-SHAPED 47X51IN

## (undated) DEVICE — UNDERGLOVES BIOGEL PI SIZE 7.5

## (undated) DEVICE — APPLICATOR CHLORAPREP ORN 26ML

## (undated) DEVICE — PAD ABDOMINAL 5X9 STERILE

## (undated) DEVICE — BLADE SAW SIGMA TSS SYS 6

## (undated) DEVICE — SUT VICRYL 2-0 27 CT-1

## (undated) DEVICE — SUT VICRYL 1 CT-1 27 UNDIE

## (undated) DEVICE — SUT STRATAFIX PGAPCL 3 FS-1

## (undated) DEVICE — COVER OVERHEAD SURG LT BLUE

## (undated) DEVICE — MANIFOLD 4 PORT

## (undated) DEVICE — SEE MEDLINE ITEM 157116

## (undated) DEVICE — ELECTRODE REM PLYHSV RETURN 9

## (undated) DEVICE — BLADE SIGMA STBCT .5IN SYS 6

## (undated) DEVICE — GAUZE SPONGE 4X4 12PLY

## (undated) DEVICE — HOOD T-5 TEAR AWAY STERILE

## (undated) DEVICE — MIXER BONE CEMENT

## (undated) DEVICE — PADDING CAST SPECIALIST 6X4YD

## (undated) DEVICE — SEE MEDLINE ITEM 146231

## (undated) DEVICE — PAD PREP 50/CA

## (undated) DEVICE — SEE MEDLINE ITEM 152622

## (undated) DEVICE — SPONGE LAP 18X18 PREWASHED

## (undated) DEVICE — SYS KNEE EPAK PIN ATTUNE
Type: IMPLANTABLE DEVICE | Site: KNEE | Status: NON-FUNCTIONAL
Removed: 2017-10-03

## (undated) DEVICE — COVER BACK TABLE 72X21

## (undated) DEVICE — ALCOHOL 70% ISOP W/GREEN 16OZ

## (undated) DEVICE — SEE MEDLINE ITEM 146292

## (undated) DEVICE — SUT CTD VICRYL CT-1 27

## (undated) DEVICE — Device

## (undated) DEVICE — PULSAVAC ZIMMER

## (undated) DEVICE — SEE MEDLINE ITEM 152523

## (undated) DEVICE — SYS CLSR DERMABOND PRINEO 22CM

## (undated) DEVICE — PAD CAST SPECIALIST STRL 6